# Patient Record
Sex: FEMALE | Race: WHITE | Employment: OTHER | URBAN - METROPOLITAN AREA
[De-identification: names, ages, dates, MRNs, and addresses within clinical notes are randomized per-mention and may not be internally consistent; named-entity substitution may affect disease eponyms.]

---

## 2017-01-03 ENCOUNTER — COMMUNICATION - HEALTHEAST (OUTPATIENT)
Dept: FAMILY MEDICINE | Facility: CLINIC | Age: 47
End: 2017-01-03

## 2017-01-03 DIAGNOSIS — F41.1 GENERALIZED ANXIETY DISORDER: ICD-10-CM

## 2017-01-10 ENCOUNTER — OFFICE VISIT - HEALTHEAST (OUTPATIENT)
Dept: RHEUMATOLOGY | Facility: CLINIC | Age: 47
End: 2017-01-10

## 2017-01-10 DIAGNOSIS — M79.7 FIBROMYALGIA: ICD-10-CM

## 2017-01-10 DIAGNOSIS — L40.50 PSORIATIC ARTHRITIS (H): ICD-10-CM

## 2017-01-11 ENCOUNTER — OFFICE VISIT - HEALTHEAST (OUTPATIENT)
Dept: PULMONOLOGY | Facility: OTHER | Age: 47
End: 2017-01-11

## 2017-01-11 ENCOUNTER — TRANSFERRED RECORDS (OUTPATIENT)
Dept: HEALTH INFORMATION MANAGEMENT | Facility: CLINIC | Age: 47
End: 2017-01-11

## 2017-01-11 DIAGNOSIS — R91.8 ABNORMAL CT SCAN, LUNG: ICD-10-CM

## 2017-01-11 DIAGNOSIS — J45.909 ASTHMA: ICD-10-CM

## 2017-01-11 DIAGNOSIS — K21.9 GERD (GASTROESOPHAGEAL REFLUX DISEASE): ICD-10-CM

## 2017-01-11 DIAGNOSIS — R91.1 LUNG NODULE: ICD-10-CM

## 2017-01-11 ASSESSMENT — MIFFLIN-ST. JEOR: SCORE: 1568.9

## 2017-01-16 ENCOUNTER — COMMUNICATION - HEALTHEAST (OUTPATIENT)
Dept: ADMINISTRATIVE | Facility: CLINIC | Age: 47
End: 2017-01-16

## 2017-01-24 ENCOUNTER — COMMUNICATION - HEALTHEAST (OUTPATIENT)
Dept: FAMILY MEDICINE | Facility: CLINIC | Age: 47
End: 2017-01-24

## 2017-01-24 ENCOUNTER — OFFICE VISIT - HEALTHEAST (OUTPATIENT)
Dept: RHEUMATOLOGY | Facility: CLINIC | Age: 47
End: 2017-01-24

## 2017-01-24 DIAGNOSIS — L40.50 PSORIATIC ARTHRITIS (H): ICD-10-CM

## 2017-01-24 DIAGNOSIS — M25.562 LEFT KNEE PAIN: ICD-10-CM

## 2017-01-25 ENCOUNTER — COMMUNICATION - HEALTHEAST (OUTPATIENT)
Dept: TELEHEALTH | Facility: CLINIC | Age: 47
End: 2017-01-25

## 2017-01-26 ENCOUNTER — AMBULATORY - HEALTHEAST (OUTPATIENT)
Dept: RHEUMATOLOGY | Facility: CLINIC | Age: 47
End: 2017-01-26

## 2017-01-31 ENCOUNTER — COMMUNICATION - HEALTHEAST (OUTPATIENT)
Dept: ADMINISTRATIVE | Facility: CLINIC | Age: 47
End: 2017-01-31

## 2017-02-07 ENCOUNTER — AMBULATORY - HEALTHEAST (OUTPATIENT)
Dept: RHEUMATOLOGY | Facility: CLINIC | Age: 47
End: 2017-02-07

## 2017-02-09 ENCOUNTER — OFFICE VISIT - HEALTHEAST (OUTPATIENT)
Dept: FAMILY MEDICINE | Facility: CLINIC | Age: 47
End: 2017-02-09

## 2017-02-09 DIAGNOSIS — F41.1 GENERALIZED ANXIETY DISORDER: ICD-10-CM

## 2017-02-09 DIAGNOSIS — L40.50 PSORIATIC ARTHRITIS (H): ICD-10-CM

## 2017-02-09 DIAGNOSIS — M79.7 FIBROMYALGIA: ICD-10-CM

## 2017-02-09 ASSESSMENT — MIFFLIN-ST. JEOR: SCORE: 1629.28

## 2017-02-13 ENCOUNTER — HOSPITAL ENCOUNTER (OUTPATIENT)
Dept: CT IMAGING | Facility: HOSPITAL | Age: 47
Discharge: HOME | End: 2017-02-13
Attending: INTERNAL MEDICINE

## 2017-02-13 DIAGNOSIS — R91.1 LUNG NODULE: ICD-10-CM

## 2017-02-14 ENCOUNTER — COMMUNICATION - HEALTHEAST (OUTPATIENT)
Dept: PULMONOLOGY | Facility: OTHER | Age: 47
End: 2017-02-14

## 2017-02-14 ENCOUNTER — COMMUNICATION - HEALTHEAST (OUTPATIENT)
Dept: ADMINISTRATIVE | Facility: CLINIC | Age: 47
End: 2017-02-14

## 2017-02-15 ENCOUNTER — TRANSFERRED RECORDS (OUTPATIENT)
Dept: HEALTH INFORMATION MANAGEMENT | Facility: CLINIC | Age: 47
End: 2017-02-15

## 2017-02-15 ENCOUNTER — HOSPITAL ENCOUNTER (OUTPATIENT)
Dept: PET IMAGING | Facility: HOSPITAL | Age: 47
Discharge: HOME | End: 2017-02-15
Attending: INTERNAL MEDICINE

## 2017-02-15 ENCOUNTER — COMMUNICATION - HEALTHEAST (OUTPATIENT)
Dept: MULTI SPECIALTY CLINIC | Facility: CLINIC | Age: 47
End: 2017-02-15

## 2017-02-15 DIAGNOSIS — R91.1 LUNG NODULE: ICD-10-CM

## 2017-02-15 ASSESSMENT — MIFFLIN-ST. JEOR: SCORE: 1582.5

## 2017-02-16 ENCOUNTER — COMMUNICATION - HEALTHEAST (OUTPATIENT)
Dept: PULMONOLOGY | Facility: OTHER | Age: 47
End: 2017-02-16

## 2017-02-16 ENCOUNTER — COMMUNICATION - HEALTHEAST (OUTPATIENT)
Dept: MULTI SPECIALTY CLINIC | Facility: CLINIC | Age: 47
End: 2017-02-16

## 2017-02-16 DIAGNOSIS — R59.0 MEDIASTINAL ADENOPATHY: ICD-10-CM

## 2017-02-16 DIAGNOSIS — R91.1 LUNG NODULE: ICD-10-CM

## 2017-02-17 ENCOUNTER — COMMUNICATION - HEALTHEAST (OUTPATIENT)
Dept: FAMILY MEDICINE | Facility: CLINIC | Age: 47
End: 2017-02-17

## 2017-02-19 ENCOUNTER — COMMUNICATION - HEALTHEAST (OUTPATIENT)
Dept: FAMILY MEDICINE | Facility: CLINIC | Age: 47
End: 2017-02-19

## 2017-02-20 ENCOUNTER — OFFICE VISIT - HEALTHEAST (OUTPATIENT)
Dept: FAMILY MEDICINE | Facility: CLINIC | Age: 47
End: 2017-02-20

## 2017-02-20 DIAGNOSIS — B02.9 HERPES ZOSTER WITHOUT COMPLICATION: ICD-10-CM

## 2017-02-20 DIAGNOSIS — R91.1 LUNG NODULE: ICD-10-CM

## 2017-02-20 ASSESSMENT — MIFFLIN-ST. JEOR
SCORE: 1614.26
SCORE: 1616.52
SCORE: 1614.26

## 2017-02-21 ENCOUNTER — ANESTHESIA - HEALTHEAST (OUTPATIENT)
Dept: SURGERY | Facility: CLINIC | Age: 47
End: 2017-02-21

## 2017-02-21 ENCOUNTER — HOSPITAL ENCOUNTER (OUTPATIENT)
Dept: CARDIOLOGY | Facility: CLINIC | Age: 47
Discharge: HOME | End: 2017-02-21
Attending: INTERNAL MEDICINE | Admitting: INTERNAL MEDICINE

## 2017-02-21 ENCOUNTER — SURGERY - HEALTHEAST (OUTPATIENT)
Dept: SURGERY | Facility: CLINIC | Age: 47
End: 2017-02-21

## 2017-02-21 ENCOUNTER — TRANSFERRED RECORDS (OUTPATIENT)
Dept: HEALTH INFORMATION MANAGEMENT | Facility: CLINIC | Age: 47
End: 2017-02-21

## 2017-02-21 ASSESSMENT — MIFFLIN-ST. JEOR
SCORE: 1613.35
SCORE: 1613.35

## 2017-02-22 ENCOUNTER — COMMUNICATION - HEALTHEAST (OUTPATIENT)
Dept: PULMONOLOGY | Facility: OTHER | Age: 47
End: 2017-02-22

## 2017-02-22 ENCOUNTER — COMMUNICATION - HEALTHEAST (OUTPATIENT)
Dept: FAMILY MEDICINE | Facility: CLINIC | Age: 47
End: 2017-02-22

## 2017-02-24 ENCOUNTER — COMMUNICATION - HEALTHEAST (OUTPATIENT)
Dept: INTENSIVE CARE | Facility: CLINIC | Age: 47
End: 2017-02-24

## 2017-02-24 DIAGNOSIS — R91.8 LUNG NODULES: Primary | ICD-10-CM

## 2017-02-27 NOTE — TELEPHONE ENCOUNTER
1.  Date/reason for appt: 3/1/17 - Left lower lobe nodule, Second opinion  2.  Referring provider: self-referred     3.  Call to patient (Yes / No - short description): Yes  4.  Previous care at / records requested from: [facility] - records received  5.  Recent Imaging: CT 1/27/17 - received from [facility]    *Pt is scheduled for CT prior to seeing Dr Call on 3/1/17*

## 2017-03-01 ENCOUNTER — PRE VISIT (OUTPATIENT)
Dept: PULMONOLOGY | Facility: CLINIC | Age: 47
End: 2017-03-01

## 2017-03-01 ENCOUNTER — OFFICE VISIT (OUTPATIENT)
Dept: PULMONOLOGY | Facility: CLINIC | Age: 47
End: 2017-03-01
Attending: INTERNAL MEDICINE
Payer: COMMERCIAL

## 2017-03-01 VITALS
HEART RATE: 86 BPM | HEIGHT: 66 IN | BODY MASS INDEX: 34.49 KG/M2 | TEMPERATURE: 98.5 F | DIASTOLIC BLOOD PRESSURE: 85 MMHG | RESPIRATION RATE: 18 BRPM | WEIGHT: 214.6 LBS | SYSTOLIC BLOOD PRESSURE: 123 MMHG | OXYGEN SATURATION: 98 %

## 2017-03-01 DIAGNOSIS — R91.8 PULMONARY NODULES: Primary | ICD-10-CM

## 2017-03-01 PROCEDURE — 99212 OFFICE O/P EST SF 10 MIN: CPT | Mod: ZF

## 2017-03-01 RX ORDER — DULOXETIN HYDROCHLORIDE 20 MG/1
CAPSULE, DELAYED RELEASE ORAL
COMMUNITY
Start: 2017-02-26 | End: 2021-06-29

## 2017-03-01 RX ORDER — HYDROXYZINE PAMOATE 25 MG/1
CAPSULE ORAL
COMMUNITY
Start: 2017-02-21 | End: 2021-02-23

## 2017-03-01 RX ORDER — HYDROCODONE BITARTRATE AND ACETAMINOPHEN 5; 325 MG/1; MG/1
TABLET ORAL
Refills: 0 | COMMUNITY
Start: 2017-02-10 | End: 2021-07-23

## 2017-03-01 RX ORDER — ALPRAZOLAM 0.5 MG
TABLET ORAL
COMMUNITY
Start: 2017-02-17 | End: 2021-02-23

## 2017-03-01 RX ORDER — VALACYCLOVIR HYDROCHLORIDE 1 G/1
TABLET, FILM COATED ORAL
COMMUNITY
Start: 2017-02-20 | End: 2021-02-23

## 2017-03-01 NOTE — MR AVS SNAPSHOT
"              After Visit Summary   3/1/2017    Sonya Mosqueda    MRN: [id number]           Patient Information     Date Of Birth          1970        Visit Information        Provider Department      3/1/2017 3:00 PM Chace Call MD [facility]        Today's Diagnoses     Pulmonary nodules    -  1       Follow-ups after your visit        Who to contact     If you have questions or need follow up information about today's clinic visit or your schedule please contact [facility] directly at [phone].  Normal or non-critical lab and imaging results will be communicated to you by Q1Mediahart, letter or phone within 4 business days after the clinic has received the results. If you do not hear from us within 7 days, please contact the clinic through [employer] or phone. If you have a critical or abnormal lab result, we will notify you by phone as soon as possible.  Submit refill requests through Singly or call your pharmacy and they will forward the refill request to us. Please allow 3 business days for your refill to be completed.          Additional Information About Your Visit        MyChart Information     Singly lets you send messages to your doctor, view your test results, renew your prescriptions, schedule appointments and more. To sign up, go to [email or web address] . Click on \"Log in\" on the left side of the screen, which will take you to the Welcome page. Then click on \"Sign up Now\" on the right side of the page.     You will be asked to enter the access code listed below, as well as some personal information. Please follow the directions to create your username and password.     Your access code is: [email or web address]  Expires: 2017  4:28 PM     Your access code will  in 90 days. If you need help or a new code, please call your [place] clinic or [phone].        Care EveryWhere ID     This is your Care EveryWhere ID. This could be used by other " "organizations to access your [place] medical records  [id number]        Your Vitals Were     Pulse Temperature Respirations Height Pulse Oximetry Breastfeeding?    86 98.5  F (36.9  C) (Oral) 18 1.683 m (5' 6.25\") 98% No    BMI (Body Mass Index)                   34.38 kg/m2            Blood Pressure from Last 3 Encounters:   03/01/17 123/85   07/08/09 98/62   12/29/05 102/70    Weight from Last 3 Encounters:   03/01/17 97.3 kg (214 lb 9.6 oz)   06/09/16 77.1 kg (170 lb)   07/08/09 77.1 kg (170 lb)              Today, you had the following     No orders found for display       Primary Care Provider Office Phone # Fax #    Josephine Bermudez DO [place] [phone]       [place] [facility] [address] [place] [address]        Thank you!     Thank you for choosing [facility]  for your care. Our goal is always to provide you with excellent care. Hearing back from our patients is one way we can continue to improve our services. Please take a few minutes to complete the written survey that you may receive in the mail after your visit with us. Thank you!             Your Updated Medication List - Protect others around you: Learn how to safely use, store and throw away your medicines at www.disposemymeds.org.          This list is accurate as of: 3/1/17 11:59 PM.  Always use your most recent med list.                   Brand Name Dispense Instructions for use    ADVAIR DISKUS 100-50 MCG/DOSE diskus inhaler   Generic drug:  fluticasone-salmeterol          ALPRAZolam 0.5 MG tablet    XANAX         celeXA 20 MG tablet   Generic drug:  citalopram      2 TABLETS DAILY       DULoxetine 20 MG EC capsule    CYMBALTA         HYDROcodone-acetaminophen 5-325 MG per tablet    NORCO     TAKE 1-2 TABS BY MOUTH ONCE DAILY AS NEEDED       hydrOXYzine 25 MG capsule    VISTARIL         IBUPROFEN 200 200 MG Tabs      1 TABLET EVERY 4 TO 6 HOURS AS NEEDED       PERCOCET 5-325 MG per tablet   Generic drug:  " oxyCODONE-acetaminophen      1 TABLET EVERY 6 HOURS AS NEEDED       PROTONIX 40 MG EC tablet   Generic drug:  pantoprazole      1 TABLET DAILY       QVAR 80 MCG/ACT Inhaler   Generic drug:  beclomethasone          valACYclovir 1000 mg tablet    VALTREX         ZOFRAN 8 MG tablet   Generic drug:  ondansetron      1 TABLET 3 TIMES DAILY       ZYRTEC 10 MG tablet   Generic drug:  cetirizine      1 TABLET DAILY

## 2017-03-01 NOTE — PROGRESS NOTES
[facility]  Lung Nodule Clinic Note  March 1, 2017    Chief complaint:  Sonya Mosqueda is a 46 year old female seen in the Pulmonary Clinic  for   Chief Complaint   Patient presents with     Oncology Clinic Visit     Pt is here for a second opinion on Bx she had     Assessment and Plan:  1.  Left upper lobe pleural based nodular density with station 11L, enlargement with PET positivity (SUV of 3.7).  The patient was seen in the [facility] system where she did have EBUS lymph node biopsies of station 7 and 11L revealing granulomatous inflammation.  I discussed with her the CT/PET as well as a fine needle aspiration cytology findings.  I indicated to her that these changes probably are related to her recent chest infection that she had in 12/2016 and my recommendation would be repeating a CT scan of the chest in 6-8 weeks, afterwards 6 months followup depending upon the CT reading.  I indicated to her that I would not do further invasive procedure at this point.   2.  Current smoker, half a pack per day for 20 years.  Also, secondhand smoke history through her parents while she was growing up.     I spent more than 45 minutes face to face and greater than 50% of time was for counseling and coordination of care about pulm nodules.    History of Present Illness:  A 46-year-old woman who had respiratory symptoms and seen by Pulmonology in late December, early January, when she at 1.5 cm peripheral pulmonary nodules, superior segment of the left lower lobe with adjacent small satellite nodularities and left hilar lymph node enlargement.  The recommendation at that time was to repeat CT scan of the chest in 1 month.  The patient repeated her CT scan of the chest in 02/13/2017.  When compared to previous CT scan, there was no significant interval change in the 5.5 cm peripheral pulmonary nodule in the superior segment of the left lower lobe and adjacent satellite nodularities.  The patient ended up having a PET scan as well as  "endobronchial ultrasound TBNA of station 7 and station 11L revealing granulomatous inflammation, no cancer.  The patient is here for a second opinion.     Exposure history: Asbestos;  No , TB;  No , Radiation;   No     Allergies   Allergen Reactions     Small Pox Vaccine      Per pt.\"My Mother was told never to allow it to be given again.\" Reaction when pt was a child     Bupropion Other (See Comments)     Cephalosporins Rash     Penicillins Rash               Past Medical History   Diagnosis Date     Calculus of kidney      Dysthymic disorder      Endometriosis, site unspecified      Myalgia and myositis, unspecified         Past Surgical History   Procedure Laterality Date     Surgical history of -   1987     laporoscopy     Surgical history of -        cryosurgery x 2        Social History     Social History     Marital status: Single     Spouse name: N/A     Number of children: N/A     Years of education: N/A     Occupational History     Not on file.     Social History Main Topics     Smoking status: Current Every Day Smoker     Packs/day: 0.50     Types: Cigarettes     Smokeless tobacco: Not on file     Alcohol use No     Drug use: Not on file     Sexual activity: Not on file     Other Topics Concern     Not on file     Social History Narrative        No family history on file.     Immunization History   Administered Date(s) Administered     Influenza (IIV3) 12/18/2008     TD (ADULT, 7+) 07/27/2004       Current Outpatient Prescriptions   Medication Sig     ALPRAZolam (XANAX) 0.5 MG tablet      QVAR 80 MCG/ACT Inhaler      DULoxetine (CYMBALTA) 20 MG EC capsule      ADVAIR DISKUS 100-50 MCG/DOSE diskus inhaler      HYDROcodone-acetaminophen (NORCO) 5-325 MG per tablet TAKE 1-2 TABS BY MOUTH ONCE DAILY AS NEEDED     hydrOXYzine (VISTARIL) 25 MG capsule      valACYclovir (VALTREX) 1000 mg tablet      CELEXA 20 MG OR TABS 2 TABLETS DAILY     ZOFRAN 8 MG OR TABS 1 TABLET 3 TIMES DAILY     PERCOCET 5-325 MG OR TABS " 1 TABLET EVERY 6 HOURS AS NEEDED     PROTONIX 40 MG OR TBEC 1 TABLET DAILY     ZYRTEC 10 MG OR TABS 1 TABLET DAILY     IBUPROFEN 200 200 MG OR TABS 1 TABLET EVERY 4 TO 6 HOURS AS NEEDED     No current facility-administered medications for this visit.       Physical examination  Constitutional: Alert, oriented, not in distress  Vitals: B/P: 123/85, T: 98.5, P: 86, R: 18  Eyes: No icterus, nystagmus, pupils isocoric  Musculoskeletal: Normal muscle mass, no dephormity  Integumentary:  No rash, normal texture and turgor, no edema  Neurological: Alert, orientedx3, no motor deficits, cranial nerves grossly normal  Psychiatric:  Mood and affect are appropriate with insight into his/her medical condition    Thank you for allowing me participate in the care of Sonya Mosqueda.    RASHAWN Call MD

## 2017-03-01 NOTE — NURSING NOTE
"Sonya Mosqueda is a 46 year old female who presents for:  Chief Complaint   Patient presents with     Oncology Clinic Visit     Pt is here for a second opinion on Bx she had        Initial Vitals:  /85 (BP Location: Left arm, Patient Position: Chair, Cuff Size: Adult Large)  Pulse 86  Temp 98.5  F (36.9  C) (Oral)  Resp 18  Ht 1.683 m (5' 6.25\")  Wt 97.3 kg (214 lb 9.6 oz)  SpO2 98%  Breastfeeding? No  BMI 34.38 kg/m2 Estimated body mass index is 34.38 kg/(m^2) as calculated from the following:    Height as of this encounter: 1.683 m (5' 6.25\").    Weight as of this encounter: 97.3 kg (214 lb 9.6 oz).. Body surface area is 2.13 meters squared. BP completed using cuff size: large  Data Unavailable No LMP recorded. Allergies and medications reviewed.     Medications: Medication refills not needed today.  Pharmacy name entered into Positionly: [facility] [id number] [place] [address]    Comments:     6 minutes for nursing intake (face to face time)   Noy Cantrell CMA        "

## 2017-03-01 NOTE — LETTER
3/1/2017       RE: Sonya Mosqueda  [address]  [facility] [place] [address]     Dear Colleague,    Thank you for referring your patient, Sonya Mosqueda, to the [facility]. Please see a copy of my visit note below.    [facility]  Lung Nodule Clinic Note  March 1, 2017    Chief complaint:  Sonya Mosqueda is a 46 year old female seen in the Pulmonary Clinic  for   Chief Complaint   Patient presents with     Oncology Clinic Visit     Pt is here for a second opinion on Bx she had     Assessment and Plan:  1.  Left upper lobe pleural based nodular density with station 11L, enlargement with PET positivity (SUV of 3.7).  The patient was seen in the [facility] system where she did have EBUS lymph node biopsies of station 7 and 11L revealing granulomatous inflammation.  I discussed with her the CT/PET as well as a fine needle aspiration cytology findings.  I indicated to her that these changes probably are related to her recent chest infection that she had in 12/2016 and my recommendation would be repeating a CT scan of the chest in 6-8 weeks, afterwards 6 months followup depending upon the CT reading.  I indicated to her that I would not do further invasive procedure at this point.   2.  Current smoker, half a pack per day for 20 years.  Also, secondhand smoke history through her parents while she was growing up.     I spent more than 45 minutes face to face and greater than 50% of time was for counseling and coordination of care about pulm nodules.    History of Present Illness:  A 46-year-old woman who had respiratory symptoms and seen by Pulmonology in late December, early January, when she at 1.5 cm peripheral pulmonary nodules, superior segment of the left lower lobe with adjacent small satellite nodularities and left hilar lymph node enlargement.  The recommendation at that time was to repeat CT scan of the chest in 1 month.  The patient repeated her CT scan  "of the chest in 02/13/2017.  When compared to previous CT scan, there was no significant interval change in the 5.5 cm peripheral pulmonary nodule in the superior segment of the left lower lobe and adjacent satellite nodularities.  The patient ended up having a PET scan as well as endobronchial ultrasound TBNA of station 7 and station 11L revealing granulomatous inflammation, no cancer.  The patient is here for a second opinion.     Exposure history: Asbestos;  No , TB;  No , Radiation;   No     Allergies   Allergen Reactions     Small Pox Vaccine      Per pt.\"My Mother was told never to allow it to be given again.\" Reaction when pt was a child     Bupropion Other (See Comments)     Cephalosporins Rash     Penicillins Rash               Past Medical History   Diagnosis Date     Calculus of kidney      Dysthymic disorder      Endometriosis, site unspecified      Myalgia and myositis, unspecified         Past Surgical History   Procedure Laterality Date     Surgical history of -   1987     laporoscopy     Surgical history of -        cryosurgery x 2        Social History     Social History     Marital status: Single     Spouse name: N/A     Number of children: N/A     Years of education: N/A     Occupational History     Not on file.     Social History Main Topics     Smoking status: Current Every Day Smoker     Packs/day: 0.50     Types: Cigarettes     Smokeless tobacco: Not on file     Alcohol use No     Drug use: Not on file     Sexual activity: Not on file     Other Topics Concern     Not on file     Social History Narrative        No family history on file.     Immunization History   Administered Date(s) Administered     Influenza (IIV3) 12/18/2008     TD (ADULT, 7+) 07/27/2004       Current Outpatient Prescriptions   Medication Sig     ALPRAZolam (XANAX) 0.5 MG tablet      QVAR 80 MCG/ACT Inhaler      DULoxetine (CYMBALTA) 20 MG EC capsule      ADVAIR DISKUS 100-50 MCG/DOSE diskus inhaler      " HYDROcodone-acetaminophen (NORCO) 5-325 MG per tablet TAKE 1-2 TABS BY MOUTH ONCE DAILY AS NEEDED     hydrOXYzine (VISTARIL) 25 MG capsule      valACYclovir (VALTREX) 1000 mg tablet      CELEXA 20 MG OR TABS 2 TABLETS DAILY     ZOFRAN 8 MG OR TABS 1 TABLET 3 TIMES DAILY     PERCOCET 5-325 MG OR TABS 1 TABLET EVERY 6 HOURS AS NEEDED     PROTONIX 40 MG OR TBEC 1 TABLET DAILY     ZYRTEC 10 MG OR TABS 1 TABLET DAILY     IBUPROFEN 200 200 MG OR TABS 1 TABLET EVERY 4 TO 6 HOURS AS NEEDED     No current facility-administered medications for this visit.       Physical examination  Constitutional: Alert, oriented, not in distress  Vitals: B/P: 123/85, T: 98.5, P: 86, R: 18  Eyes: No icterus, nystagmus, pupils isocoric  Musculoskeletal: Normal muscle mass, no dephormity  Integumentary:  No rash, normal texture and turgor, no edema  Neurological: Alert, orientedx3, no motor deficits, cranial nerves grossly normal  Psychiatric:  Mood and affect are appropriate with insight into his/her medical condition    Thank you for allowing me participate in the care of Sonya Mosqueda.    RASHAWN Call MD

## 2017-03-02 ENCOUNTER — COMMUNICATION - HEALTHEAST (OUTPATIENT)
Dept: FAMILY MEDICINE | Facility: CLINIC | Age: 47
End: 2017-03-02

## 2017-03-02 DIAGNOSIS — E78.5 HYPERLIPEMIA: ICD-10-CM

## 2017-03-03 ENCOUNTER — COMMUNICATION - HEALTHEAST (OUTPATIENT)
Dept: PULMONOLOGY | Facility: OTHER | Age: 47
End: 2017-03-03

## 2017-03-03 DIAGNOSIS — R59.0 MEDIASTINAL LYMPHADENOPATHY: ICD-10-CM

## 2017-03-06 ENCOUNTER — RECORDS - HEALTHEAST (OUTPATIENT)
Dept: ADMINISTRATIVE | Facility: OTHER | Age: 47
End: 2017-03-06

## 2017-03-06 PROCEDURE — 00000346 ZZHCL STATISTIC REVIEW OUTSIDE SLIDES TC 88321: Performed by: INTERNAL MEDICINE

## 2017-03-12 ENCOUNTER — COMMUNICATION - HEALTHEAST (OUTPATIENT)
Dept: FAMILY MEDICINE | Facility: CLINIC | Age: 47
End: 2017-03-12

## 2017-03-12 DIAGNOSIS — K21.9 GERD (GASTROESOPHAGEAL REFLUX DISEASE): ICD-10-CM

## 2017-03-12 DIAGNOSIS — M79.10 MYALGIA: ICD-10-CM

## 2017-03-12 DIAGNOSIS — M60.9 MYOSITIS: ICD-10-CM

## 2017-03-15 ENCOUNTER — COMMUNICATION - HEALTHEAST (OUTPATIENT)
Dept: FAMILY MEDICINE | Facility: CLINIC | Age: 47
End: 2017-03-15

## 2017-03-15 DIAGNOSIS — J45.909 ASTHMA: ICD-10-CM

## 2017-03-17 LAB — COPATH REPORT: NORMAL

## 2017-03-22 LAB
LAB AP CHARGES (HE HISTORICAL CONVERSION): ABNORMAL
LAB AP INITIAL CYTO EVAL (HE HISTORICAL CONVERSION): ABNORMAL
LAB MED GENERAL PATH INTERP (HE HISTORICAL CONVERSION): ABNORMAL
PATH REPORT.ADDENDUM SPEC: ABNORMAL
PATH REPORT.COMMENTS IMP SPEC: ABNORMAL
PATH REPORT.COMMENTS IMP SPEC: ABNORMAL
PATH REPORT.FINAL DX SPEC: ABNORMAL
PATH REPORT.MICROSCOPIC SPEC OTHER STN: ABNORMAL
PATH REPORT.RELEVANT HX SPEC: ABNORMAL
SPECIMEN DESCRIPTION: ABNORMAL

## 2017-03-28 ENCOUNTER — COMMUNICATION - HEALTHEAST (OUTPATIENT)
Dept: FAMILY MEDICINE | Facility: CLINIC | Age: 47
End: 2017-03-28

## 2017-03-28 DIAGNOSIS — F41.1 GENERALIZED ANXIETY DISORDER: ICD-10-CM

## 2017-04-14 ENCOUNTER — COMMUNICATION - HEALTHEAST (OUTPATIENT)
Dept: FAMILY MEDICINE | Facility: CLINIC | Age: 47
End: 2017-04-14

## 2017-04-14 DIAGNOSIS — M60.9 MYOSITIS: ICD-10-CM

## 2017-04-14 DIAGNOSIS — M79.10 MYALGIA: ICD-10-CM

## 2017-04-19 ENCOUNTER — OFFICE VISIT - HEALTHEAST (OUTPATIENT)
Dept: FAMILY MEDICINE | Facility: CLINIC | Age: 47
End: 2017-04-19

## 2017-04-19 ENCOUNTER — COMMUNICATION - HEALTHEAST (OUTPATIENT)
Dept: SCHEDULING | Facility: CLINIC | Age: 47
End: 2017-04-19

## 2017-04-19 DIAGNOSIS — J01.90 ACUTE NON-RECURRENT SINUSITIS, UNSPECIFIED LOCATION: ICD-10-CM

## 2017-04-26 ENCOUNTER — RECORDS - HEALTHEAST (OUTPATIENT)
Dept: ADMINISTRATIVE | Facility: OTHER | Age: 47
End: 2017-04-26

## 2017-05-01 ENCOUNTER — HOSPITAL ENCOUNTER (OUTPATIENT)
Dept: CT IMAGING | Facility: HOSPITAL | Age: 47
Discharge: HOME | End: 2017-05-01

## 2017-05-01 DIAGNOSIS — R59.0 MEDIASTINAL LYMPHADENOPATHY: ICD-10-CM

## 2017-05-02 ENCOUNTER — COMMUNICATION - HEALTHEAST (OUTPATIENT)
Dept: PULMONOLOGY | Facility: OTHER | Age: 47
End: 2017-05-02

## 2017-05-11 ENCOUNTER — COMMUNICATION - HEALTHEAST (OUTPATIENT)
Dept: FAMILY MEDICINE | Facility: CLINIC | Age: 47
End: 2017-05-11

## 2017-05-11 DIAGNOSIS — F41.1 GENERALIZED ANXIETY DISORDER: ICD-10-CM

## 2017-05-19 ENCOUNTER — RECORDS - HEALTHEAST (OUTPATIENT)
Dept: ADMINISTRATIVE | Facility: OTHER | Age: 47
End: 2017-05-19

## 2017-05-28 ENCOUNTER — COMMUNICATION - HEALTHEAST (OUTPATIENT)
Dept: SCHEDULING | Facility: CLINIC | Age: 47
End: 2017-05-28

## 2017-05-30 ENCOUNTER — COMMUNICATION - HEALTHEAST (OUTPATIENT)
Dept: FAMILY MEDICINE | Facility: CLINIC | Age: 47
End: 2017-05-30

## 2017-05-31 ENCOUNTER — COMMUNICATION - HEALTHEAST (OUTPATIENT)
Dept: FAMILY MEDICINE | Facility: CLINIC | Age: 47
End: 2017-05-31

## 2017-06-17 ENCOUNTER — HEALTH MAINTENANCE LETTER (OUTPATIENT)
Age: 47
End: 2017-06-17

## 2017-06-22 ENCOUNTER — COMMUNICATION - HEALTHEAST (OUTPATIENT)
Dept: FAMILY MEDICINE | Facility: CLINIC | Age: 47
End: 2017-06-22

## 2017-06-22 DIAGNOSIS — M79.10 MYALGIA: ICD-10-CM

## 2017-06-22 DIAGNOSIS — M60.9 MYOSITIS: ICD-10-CM

## 2017-06-26 ENCOUNTER — AMBULATORY - HEALTHEAST (OUTPATIENT)
Dept: LAB | Facility: CLINIC | Age: 47
End: 2017-06-26

## 2017-06-26 DIAGNOSIS — E78.00 PURE HYPERCHOLESTEROLEMIA: ICD-10-CM

## 2017-06-26 LAB
CHOLEST SERPL-MCNC: 231 MG/DL
FASTING STATUS PATIENT QL REPORTED: YES
HDLC SERPL-MCNC: 69 MG/DL
LDLC SERPL CALC-MCNC: 133 MG/DL
TRIGL SERPL-MCNC: 147 MG/DL

## 2017-06-29 ENCOUNTER — OFFICE VISIT (OUTPATIENT)
Dept: RHEUMATOLOGY | Facility: CLINIC | Age: 47
End: 2017-06-29
Payer: COMMERCIAL

## 2017-06-29 ENCOUNTER — OFFICE VISIT - HEALTHEAST (OUTPATIENT)
Dept: FAMILY MEDICINE | Facility: CLINIC | Age: 47
End: 2017-06-29

## 2017-06-29 ENCOUNTER — COMMUNICATION - HEALTHEAST (OUTPATIENT)
Dept: FAMILY MEDICINE | Facility: CLINIC | Age: 47
End: 2017-06-29

## 2017-06-29 VITALS
HEART RATE: 82 BPM | DIASTOLIC BLOOD PRESSURE: 72 MMHG | TEMPERATURE: 99.3 F | WEIGHT: 231 LBS | BODY MASS INDEX: 37 KG/M2 | SYSTOLIC BLOOD PRESSURE: 114 MMHG

## 2017-06-29 DIAGNOSIS — M50.30 DEGENERATION OF CERVICAL INTERVERTEBRAL DISC: ICD-10-CM

## 2017-06-29 DIAGNOSIS — M79.7 FIBROMYALGIA: ICD-10-CM

## 2017-06-29 DIAGNOSIS — M25.50 MULTIPLE JOINT PAIN: Primary | ICD-10-CM

## 2017-06-29 DIAGNOSIS — L40.9 PSORIASIS: ICD-10-CM

## 2017-06-29 PROCEDURE — 99203 OFFICE O/P NEW LOW 30 MIN: CPT | Performed by: INTERNAL MEDICINE

## 2017-06-29 NOTE — NURSING NOTE
"Chief Complaint   Patient presents with     Consult     [facility]        Initial /72  Pulse 82  Temp 99.3  F (37.4  C)  Wt 231 lb (104.8 kg)  BMI 37 kg/m2 Estimated body mass index is 37 kg/(m^2) as calculated from the following:    Height as of 3/1/17: 5' 6.25\" (1.683 m).    Weight as of this encounter: 231 lb (104.8 kg).      Patient prefers to be contacted via at Home.   Okay to leave detailed message: Yes  Patient uses MyChart: No    Khloe MARX LPN    "

## 2017-06-29 NOTE — MR AVS SNAPSHOT
"              After Visit Summary   6/29/2017    Sonya Mosqueda    MRN: [id number]           Patient Information     Date Of Birth          1970        Visit Information        Provider Department      6/29/2017 2:45 PM Rajat Nuñez MD [facility]         Follow-ups after your visit        Your next 10 appointments already scheduled     Aug 09, 2017  7:45 AM CDT   Return Visit with Rajat Nuñez MD   [facility]    [address] [id number] [phone]              Who to contact     If you have questions or need follow up information about today's clinic visit or your schedule please contact [facility] directly at [phone].  Normal or non-critical lab and imaging results will be communicated to you by MyChart, letter or phone within 4 business days after the clinic has received the results. If you do not hear from us within 7 days, please contact the clinic through MyChart or phone. If you have a critical or abnormal lab result, we will notify you by phone as soon as possible.  Submit refill requests through iFlexMe or call your pharmacy and they will forward the refill request to us. Please allow 3 business days for your refill to be completed.          Additional Information About Your Visit        MyChart Information     iFlexMe lets you send messages to your doctor, view your test results, renew your prescriptions, schedule appointments and more. To sign up, go to [email or web address] . Click on \"Log in\" on the left side of the screen, which will take you to the Welcome page. Then click on \"Sign up Now\" on the right side of the page.     You will be asked to enter the access code listed below, as well as some personal information. Please follow the directions to create your username and password.     Your access code is: [email or web address]  Expires: 9/27/2017  3:06 PM     Your access code will "  in 90 days. If you need help or a new code, please call your [place] clinic or [phone].        Care EveryWhere ID     This is your Care EveryWhere ID. This could be used by other organizations to access your [place] medical records  [id number]        Your Vitals Were     Pulse Temperature BMI (Body Mass Index)             82 99.3  F (37.4  C) 37 kg/m2          Blood Pressure from Last 3 Encounters:   17 114/72   17 123/85   09 98/62    Weight from Last 3 Encounters:   17 231 lb (104.8 kg)   17 214 lb 9.6 oz (97.3 kg)   16 170 lb (77.1 kg)              Today, you had the following     No orders found for display       Primary Care Provider Office Phone # Fax #    Josephine Bermudez DO [place] [phone]       [place] [address] [place] [address]        Equal Access to Services     TRACIE ESQUIVEL : Hadii aad ku hadasho Soomaali, waaxda luqadaha, qaybta kaalmada adeegyada, waxay idiin haymarshal rubi . So [facility] [phone].    ATENCIÓN: Si habla español, tiene a whiting disposición servicios gratuitos de asistencia lingüística. Llame al [phone].    We comply with applicable federal civil rights laws and [place] laws. We do not discriminate on the basis of race, color, national origin, age, disability sex, sexual orientation or gender identity.            Thank you!     Thank you for choosing [facility]  for your care. Our goal is always to provide you with excellent care. Hearing back from our patients is one way we can continue to improve our services. Please take a few minutes to complete the written survey that you may receive in the mail after your visit with us. Thank you!             Your Updated Medication List - Protect others around you: Learn how to safely use, store and throw away your medicines at www.disposemymeds.org.          This list is accurate as of: 17  3:06 PM.  Always use your most recent  med list.                   Brand Name Dispense Instructions for use Diagnosis    ADVAIR DISKUS 100-50 MCG/DOSE diskus inhaler   Generic drug:  fluticasone-salmeterol           ALPRAZolam 0.5 MG tablet    XANAX          celeXA 20 MG tablet   Generic drug:  citalopram      2 TABLETS DAILY        DULoxetine 20 MG EC capsule    CYMBALTA          HYDROcodone-acetaminophen 5-325 MG per tablet    NORCO     TAKE 1-2 TABS BY MOUTH ONCE DAILY AS NEEDED        hydrOXYzine 25 MG capsule    VISTARIL          IBUPROFEN 200 200 MG Tabs      1 TABLET EVERY 4 TO 6 HOURS AS NEEDED        PERCOCET 5-325 MG per tablet   Generic drug:  oxyCODONE-acetaminophen      1 TABLET EVERY 6 HOURS AS NEEDED        PROTONIX 40 MG EC tablet   Generic drug:  pantoprazole      1 TABLET DAILY    Esophageal reflux       QVAR 80 MCG/ACT Inhaler   Generic drug:  beclomethasone           valACYclovir 1000 mg tablet    VALTREX          ZOFRAN 8 MG tablet   Generic drug:  ondansetron      1 TABLET 3 TIMES DAILY        ZYRTEC 10 MG tablet   Generic drug:  cetirizine      1 TABLET DAILY

## 2017-07-10 ENCOUNTER — TELEPHONE (OUTPATIENT)
Dept: RHEUMATOLOGY | Facility: CLINIC | Age: 47
End: 2017-07-10

## 2017-07-10 DIAGNOSIS — M25.50 MULTIPLE JOINT PAIN: Primary | ICD-10-CM

## 2017-07-10 NOTE — TELEPHONE ENCOUNTER
See note below. No documentation from provider. Please review and proceed with medication discussed.   Meredith ADAN RN BSN PHN  Specialty Clinics

## 2017-07-10 NOTE — TELEPHONE ENCOUNTER
Reason for Call:  Patient is calling in states that she is flaring and would like to proceed with starting medication    Detailed comments: please advise how to proceed    Phone Number Patient can be reached at: Home number on file [phone] (home)    Best Time: any    Can we leave a detailed message on this number? YES    Call taken on 7/10/2017 at 3:46 PM by Brii Colon

## 2017-07-11 RX ORDER — SULFASALAZINE 500 MG/1
500 TABLET, DELAYED RELEASE ORAL 2 TIMES DAILY
Qty: 120 TABLET | Refills: 2 | Status: SHIPPED | OUTPATIENT
Start: 2017-07-11 | End: 2021-02-23

## 2017-07-11 NOTE — TELEPHONE ENCOUNTER
Called and spoke with pt regarding note below. Pt informed. Pt has appointment scheduled with Dr Nuñez on 8/9.   No further questions or concerns.   Lilliam LITTLE RN  Specialty [facility]

## 2017-07-11 NOTE — TELEPHONE ENCOUNTER
I think we talked about plaquenil?  Could you please check or was it methotrexate?  Thanks  Sorry about that

## 2017-07-11 NOTE — TELEPHONE ENCOUNTER
Asulfazine was what patient recalls.  Any that have the least side effects and least drug monitoring is good with her.  Trinity Ventura RN  [facility]

## 2017-07-11 NOTE — TELEPHONE ENCOUNTER
"Dr Nuñez-    Spoke to patient who stated:    \"He told me next time I had a flare to take the Prednisone to see if it is helping or not. It is helping. I was going to try and control this with diet, but it isn't working because I am terrible about following a diet... This time my feet swelled up like little sausages and they are extremely painful and I have swollen toes, my left knee is swollen and a little warm and about an inch and a half bigger than the other knee, going down the steps yesterday about wiped me out. My right pointer finger also swelled up and my bottom eyelids swelled up and are dry and puffy. They were red and swollen a few days ago, so bad I couldn't wear makeup but they are just puffy today. The medicine I think he said was a Dmard medicine I could try?...\"    Reports she \"was taking 20 mg Prednisone for 5 days but I finished it a couple days ago and now my symptoms are creeping back so much that I noticed a difference in just being off it the last couple days.\"    Uses [facility] pharmacy.    Ok to leave detailed message- Special ed [facility] .     Please advise. Columba Thompson RN    "

## 2017-07-11 NOTE — TELEPHONE ENCOUNTER
Ok, I will send it in.  Follow directions on RX.  Can take 6-12 weeks to work.  Should be seen back at that time

## 2017-07-11 NOTE — TELEPHONE ENCOUNTER
Unfortunately my note is missing---can you clarify what we discussed?  Was it prednisone  And what exactly is flaring?  thanks

## 2017-07-18 NOTE — PROGRESS NOTES
PRIMARY CARE PHYSICIAN:  Dr. Josephine Jaimes.    CHIEF COMPLAINT:  Joint pain.    HISTORY OF PRESENT ILLNESS:  The patient had been seen by Dr. Aleena bush in 2004 who thought she had fibromyalgia.  More recently seen by Dr. Crawford who is with [facility].  She did not feel that she had a very good interaction with him and is hoping to get a different interaction today.  She does clearly have psoriasis that is mostly on her scalp.  She feels it is mostly controlled with light therapy and topical steroid creams.  She does not feel it needs to be treated more aggressively today.  She apparently was given Otezla by her dermatologist but after 2 weeks of headaches, decided to discontinue it.    When she hurts she is especially hurting in the left lateral region of both feet.  There is some pain and swelling that is somewhat episodic.  She also does hurt somewhat in her hands and is noticing pain in other areas as well.        She had been given a Medrol Dosepak for treatment of allergic reaction and does think it had helped the joint pain when it was given over 6 months ago.    Recently was seen by pulmonology and found to have some nodules.  There was some concern that she could have sarcoid, but the most recent CT scan in February revealed improvement in the nodule.  It was felt that it might be infection related.  The diagnosis of sarcoid was never made or proven.  Fine needle aspiration revealed polymorphous lymphoid aggregates that were nonspecific in nature.  A nonnecrotizing granuloma was present as well.  A CT did reveal a small left hilar lymph node that did appear decreased in size.      What she really wants to do is establish care and is not certain that she wants any more aggressive therapy at this point.  She says the ibuprofen helps tremendously.  What she would like to do is lose weight and try an anti-inflammatory diet.  She would like to be seen back prior to school when things often get worse and when  treatment may become necessary.    PAST MEDICAL HISTORY:  Psoriasis, fibromyalgia.    MEDICATIONS:  Alprazolam, QVAR inhaler, duloxetine, Advair, hydrocodone p.r.n., Vistaril, Zofran p.r.n., Percocet, Protonix.    ALLERGIES:  Bupropion, cephalosporins.    SOCIAL HISTORY:  Is not a smoker or drinker.    FAMILY HISTORY:  There is nobody in her family with rheumatoid arthritis, lupus.    REVIEW OF SYSTEMS:  A 10-point review is otherwise negative.    PHYSICAL EXAMINATION:  VITAL SIGNS:  Blood pressure 114/72, pulse 82, weight 231.    HEENT:  She is normocephalic, atraumatic.  Sclerae are clear and moist.  Oropharynx without lesions.    NECK:  Supple without lymphadenopathy.    LUNGS:  Clear to auscultation bilaterally without wheeze or rale.    HEART:  Regular rate and rhythm without murmur or rub.    ABDOMEN:  Nontender with normal bowel sounds.    JOINT EXAM:  I really do not see any dactylitis in the upper or lower extremities.  There is nothing that looks like synovitis at the wrists, MCPs or PIPs.  There is no synovitis of the shoulders, knees or ankles.  She has tender points of fibromyalgia upper chest wall region, neck, trapezius region, mid scapular and sternal border, etc.    SKIN:  No obvious psoriasis seen today.    IMPRESSION:    1.  Fibromyalgia.  2.  Psoriasis.  3.  Possible mild psoriatic arthritis.    RECOMMENDATIONS:    1.  For now, she will continue to use NSAIDS for symptoms as needed.  2.  If things get worse, we talked about attempting to diagnose her with either a course of prednisone and if responsive, then consider long-term therapy with sulfasalazine or methotrexate.  3.  She will follow up with me prior to school.        Rajat Nuñez MD    D:  07/12/2017 15:20 T:  07/18/2017 04:17  Document:  [id number] \LG

## 2017-07-21 ENCOUNTER — COMMUNICATION - HEALTHEAST (OUTPATIENT)
Dept: FAMILY MEDICINE | Facility: CLINIC | Age: 47
End: 2017-07-21

## 2017-07-21 DIAGNOSIS — K21.9 GERD (GASTROESOPHAGEAL REFLUX DISEASE): ICD-10-CM

## 2017-08-01 ENCOUNTER — OFFICE VISIT - HEALTHEAST (OUTPATIENT)
Dept: FAMILY MEDICINE | Facility: CLINIC | Age: 47
End: 2017-08-01

## 2017-08-01 DIAGNOSIS — E66.9 OBESITY: ICD-10-CM

## 2017-08-01 DIAGNOSIS — R60.0 PERIPHERAL EDEMA: ICD-10-CM

## 2017-08-01 DIAGNOSIS — L40.50 PSORIATIC ARTHRITIS (H): ICD-10-CM

## 2017-08-01 ASSESSMENT — MIFFLIN-ST. JEOR: SCORE: 1741.26

## 2017-08-07 ENCOUNTER — COMMUNICATION - HEALTHEAST (OUTPATIENT)
Dept: FAMILY MEDICINE | Facility: CLINIC | Age: 47
End: 2017-08-07

## 2017-08-07 DIAGNOSIS — K21.9 GERD (GASTROESOPHAGEAL REFLUX DISEASE): ICD-10-CM

## 2017-08-07 DIAGNOSIS — F41.1 GENERALIZED ANXIETY DISORDER: ICD-10-CM

## 2017-08-08 ENCOUNTER — AMBULATORY - HEALTHEAST (OUTPATIENT)
Dept: LAB | Facility: CLINIC | Age: 47
End: 2017-08-08

## 2017-08-08 ENCOUNTER — OFFICE VISIT - HEALTHEAST (OUTPATIENT)
Dept: FAMILY MEDICINE | Facility: CLINIC | Age: 47
End: 2017-08-08

## 2017-08-08 DIAGNOSIS — K21.9 ACID REFLUX: ICD-10-CM

## 2017-08-08 DIAGNOSIS — Z71.9 HEALTH EDUCATION: ICD-10-CM

## 2017-08-08 DIAGNOSIS — R06.83 SNORING: ICD-10-CM

## 2017-08-08 DIAGNOSIS — Z13.0 SCREENING FOR DEFICIENCY ANEMIA: ICD-10-CM

## 2017-08-08 DIAGNOSIS — E55.9 VITAMIN D DEFICIENCY: ICD-10-CM

## 2017-08-08 DIAGNOSIS — E66.9 OBESITY, UNSPECIFIED: ICD-10-CM

## 2017-08-08 DIAGNOSIS — R63.5 WEIGHT GAIN: ICD-10-CM

## 2017-08-08 DIAGNOSIS — I87.2 VENOUS INSUFFICIENCY OF BOTH LOWER EXTREMITIES: ICD-10-CM

## 2017-08-08 DIAGNOSIS — F50.20 BULIMIA NERVOSA: ICD-10-CM

## 2017-08-08 DIAGNOSIS — Z13.1 SCREENING FOR DIABETES MELLITUS: ICD-10-CM

## 2017-08-08 DIAGNOSIS — Z13.29 SCREENING FOR THYROID DISORDER: ICD-10-CM

## 2017-08-08 DIAGNOSIS — E78.00 PURE HYPERCHOLESTEROLEMIA: ICD-10-CM

## 2017-08-08 LAB — HBA1C MFR BLD: 5.5 % (ref 3.5–6)

## 2017-08-08 ASSESSMENT — MIFFLIN-ST. JEOR: SCORE: 1728.79

## 2017-08-09 ENCOUNTER — OFFICE VISIT (OUTPATIENT)
Dept: RHEUMATOLOGY | Facility: CLINIC | Age: 47
End: 2017-08-09
Payer: COMMERCIAL

## 2017-08-09 ENCOUNTER — RECORDS - HEALTHEAST (OUTPATIENT)
Dept: ADMINISTRATIVE | Facility: OTHER | Age: 47
End: 2017-08-09

## 2017-08-09 VITALS
DIASTOLIC BLOOD PRESSURE: 82 MMHG | SYSTOLIC BLOOD PRESSURE: 128 MMHG | BODY MASS INDEX: 38.45 KG/M2 | WEIGHT: 240 LBS | HEART RATE: 86 BPM

## 2017-08-09 DIAGNOSIS — M79.7 FIBROMYALGIA: Primary | ICD-10-CM

## 2017-08-09 DIAGNOSIS — L40.9 PSORIASIS: ICD-10-CM

## 2017-08-09 PROCEDURE — 99214 OFFICE O/P EST MOD 30 MIN: CPT | Performed by: INTERNAL MEDICINE

## 2017-08-09 NOTE — NURSING NOTE
"Chief Complaint   Patient presents with     RECHECK     PSA- would like to go over medications       Initial /82  Pulse 86  Wt 240 lb (108.9 kg)  BMI 38.45 kg/m2 Estimated body mass index is 38.45 kg/(m^2) as calculated from the following:    Height as of 3/1/17: 5' 6.25\" (1.683 m).    Weight as of this encounter: 240 lb (108.9 kg).  Medication Reconciliation: complete    "

## 2017-08-09 NOTE — PROGRESS NOTES
Ms. Sonya Mosqueda is seen back in followup to review her status.  She has now been on sulfasalazine for about a month.  She has had a reasonable response to prednisone, she felt better but when she came off it she had a dramatic flare with worsening symptoms involving her knees and ankles.  She also developed some peripheral edema which could have been related to prednisone but would not have been related to the usage of sulfasalazine or be related to psoriatic arthritis.  She also has seen a weight loss Doctor and is going to be going on phentermine to try to lose weight.  This is the heaviest weight she has been and she is wondering if this could be a partial explanation for why her knees and ankles hurt.      At this point, there are no obvious problems with sulfasalazine such as nausea, vomiting, diarrhea.  Once again we are embarking on this course because there was improvement on prednisone from her point of view.      PHYSICAL EXAMINATION:   VITAL SIGNS:  Blood pressure 128/82, pulse 86, weight 240.   NECK:  Supple without lymphadenopathy.   LUNGS:  Clear.   HEART:  Regular.   MUSCULOSKELETAL:  Joint exam no synovitis of the wrists, MCPs, or PIPs.  There is no synovitis of the elbows, shoulders, knees or ankles.   SKIN:  Without lesions.      ASSESSMENT:   1.  Fibromyalgia.   2.  Psoriatic arthritis.      RECOMMENDATIONS:   1.  Discussed the knee and ankle pain is probably multifactorial and certainly could be due to weight and deconditioning as well as a component of psoriatic arthritis; there is no real way of telling this.  We are embarking on this course of action because she did have improvement with prednisone.  Sulfasalazine does take 6-12 weeks to work, so I would give it more time.   2.  She will continue to use NSAIDs as needed for breakthrough symptoms, although this certainly could be contributing to peripheral edema.  The edema though would not be related to her arthritis, nor to sulfasalazine  usage.   3.  Encourage regular exercise and weight loss which may help some of the symptoms of lower extremities.   4.  Follow up with me in 2 months.  We will need check labs at that point.         GUS CALLOWAY MD             D: 2017 10:05   T: 2017 10:32   MT: [id number]      Name:     AMAURI GONZALEZ   MRN:      [id number]        Account:      [id number]   :      1970           Visit Date:   2017      Document: [id number]       cc: Jorge A Barker MD

## 2017-08-14 ENCOUNTER — COMMUNICATION - HEALTHEAST (OUTPATIENT)
Dept: SCHEDULING | Facility: CLINIC | Age: 47
End: 2017-08-14

## 2017-08-14 DIAGNOSIS — E66.9 OBESITY, UNSPECIFIED: ICD-10-CM

## 2017-08-31 ENCOUNTER — RECORDS - HEALTHEAST (OUTPATIENT)
Dept: ADMINISTRATIVE | Facility: OTHER | Age: 47
End: 2017-08-31

## 2017-09-04 ENCOUNTER — COMMUNICATION - HEALTHEAST (OUTPATIENT)
Dept: FAMILY MEDICINE | Facility: CLINIC | Age: 47
End: 2017-09-04

## 2017-09-04 DIAGNOSIS — M79.10 MYALGIA: ICD-10-CM

## 2017-09-04 DIAGNOSIS — M60.9 MYOSITIS: ICD-10-CM

## 2017-09-08 ENCOUNTER — COMMUNICATION - HEALTHEAST (OUTPATIENT)
Dept: FAMILY MEDICINE | Facility: CLINIC | Age: 47
End: 2017-09-08

## 2017-09-11 ENCOUNTER — OFFICE VISIT - HEALTHEAST (OUTPATIENT)
Dept: FAMILY MEDICINE | Facility: CLINIC | Age: 47
End: 2017-09-11

## 2017-09-11 DIAGNOSIS — R06.83 SNORING: ICD-10-CM

## 2017-09-11 DIAGNOSIS — E78.00 PURE HYPERCHOLESTEROLEMIA: ICD-10-CM

## 2017-09-11 DIAGNOSIS — E66.9 OBESITY, UNSPECIFIED: ICD-10-CM

## 2017-09-11 DIAGNOSIS — E55.9 VITAMIN D DEFICIENCY: ICD-10-CM

## 2017-09-26 ENCOUNTER — COMMUNICATION - HEALTHEAST (OUTPATIENT)
Dept: FAMILY MEDICINE | Facility: CLINIC | Age: 47
End: 2017-09-26

## 2017-09-26 DIAGNOSIS — M79.10 MYALGIA: ICD-10-CM

## 2017-09-26 DIAGNOSIS — M60.9 MYOSITIS: ICD-10-CM

## 2017-10-10 ENCOUNTER — COMMUNICATION - HEALTHEAST (OUTPATIENT)
Dept: FAMILY MEDICINE | Facility: CLINIC | Age: 47
End: 2017-10-10

## 2017-10-10 DIAGNOSIS — F41.1 GENERALIZED ANXIETY DISORDER: ICD-10-CM

## 2017-10-12 ENCOUNTER — OFFICE VISIT - HEALTHEAST (OUTPATIENT)
Dept: FAMILY MEDICINE | Facility: CLINIC | Age: 47
End: 2017-10-12

## 2017-10-12 DIAGNOSIS — E55.9 VITAMIN D DEFICIENCY: ICD-10-CM

## 2017-10-12 DIAGNOSIS — R06.83 SNORING: ICD-10-CM

## 2017-10-12 DIAGNOSIS — E66.9 OBESITY: ICD-10-CM

## 2017-10-12 DIAGNOSIS — E78.00 PURE HYPERCHOLESTEROLEMIA: ICD-10-CM

## 2017-10-19 ENCOUNTER — COMMUNICATION - HEALTHEAST (OUTPATIENT)
Dept: FAMILY MEDICINE | Facility: CLINIC | Age: 47
End: 2017-10-19

## 2017-10-24 ENCOUNTER — COMMUNICATION - HEALTHEAST (OUTPATIENT)
Dept: FAMILY MEDICINE | Facility: CLINIC | Age: 47
End: 2017-10-24

## 2017-10-24 DIAGNOSIS — M79.10 MYALGIA: ICD-10-CM

## 2017-10-24 DIAGNOSIS — M60.9 MYOSITIS: ICD-10-CM

## 2017-11-10 ENCOUNTER — OFFICE VISIT - HEALTHEAST (OUTPATIENT)
Dept: FAMILY MEDICINE | Facility: CLINIC | Age: 47
End: 2017-11-10

## 2017-11-10 DIAGNOSIS — Z12.31 VISIT FOR SCREENING MAMMOGRAM: ICD-10-CM

## 2017-11-10 DIAGNOSIS — E78.00 PURE HYPERCHOLESTEROLEMIA: ICD-10-CM

## 2017-11-10 DIAGNOSIS — R06.83 SNORING: ICD-10-CM

## 2017-11-10 DIAGNOSIS — E55.9 VITAMIN D DEFICIENCY: ICD-10-CM

## 2017-11-10 DIAGNOSIS — E66.9 OBESITY: ICD-10-CM

## 2017-11-21 ENCOUNTER — COMMUNICATION - HEALTHEAST (OUTPATIENT)
Dept: FAMILY MEDICINE | Facility: CLINIC | Age: 47
End: 2017-11-21

## 2017-11-21 DIAGNOSIS — J45.909 ASTHMA: ICD-10-CM

## 2017-12-05 ENCOUNTER — COMMUNICATION - HEALTHEAST (OUTPATIENT)
Dept: FAMILY MEDICINE | Facility: CLINIC | Age: 47
End: 2017-12-05

## 2017-12-05 DIAGNOSIS — M60.9 MYOSITIS: ICD-10-CM

## 2017-12-05 DIAGNOSIS — M79.10 MYALGIA: ICD-10-CM

## 2017-12-09 ENCOUNTER — COMMUNICATION - HEALTHEAST (OUTPATIENT)
Dept: FAMILY MEDICINE | Facility: CLINIC | Age: 47
End: 2017-12-09

## 2017-12-09 DIAGNOSIS — Z87.891 PERSONAL HISTORY OF TOBACCO USE, PRESENTING HAZARDS TO HEALTH: ICD-10-CM

## 2017-12-11 ENCOUNTER — COMMUNICATION - HEALTHEAST (OUTPATIENT)
Dept: FAMILY MEDICINE | Facility: CLINIC | Age: 47
End: 2017-12-11

## 2017-12-11 DIAGNOSIS — E55.9 VITAMIN D DEFICIENCY: ICD-10-CM

## 2017-12-11 DIAGNOSIS — R06.83 SNORING: ICD-10-CM

## 2017-12-11 DIAGNOSIS — E78.00 PURE HYPERCHOLESTEROLEMIA: ICD-10-CM

## 2017-12-11 DIAGNOSIS — E66.9 OBESITY: ICD-10-CM

## 2017-12-14 ENCOUNTER — COMMUNICATION - HEALTHEAST (OUTPATIENT)
Dept: FAMILY MEDICINE | Facility: CLINIC | Age: 47
End: 2017-12-14

## 2017-12-14 DIAGNOSIS — K21.9 ACID REFLUX: ICD-10-CM

## 2017-12-15 ENCOUNTER — OFFICE VISIT - HEALTHEAST (OUTPATIENT)
Dept: PULMONOLOGY | Facility: OTHER | Age: 47
End: 2017-12-15

## 2017-12-15 DIAGNOSIS — R91.1 LUNG NODULE: ICD-10-CM

## 2017-12-15 DIAGNOSIS — R94.2 ABNORMAL PET OF LEFT LUNG: ICD-10-CM

## 2017-12-15 DIAGNOSIS — J30.9 ALLERGIC RHINITIS, UNSPECIFIED CHRONICITY, UNSPECIFIED SEASONALITY, UNSPECIFIED TRIGGER: ICD-10-CM

## 2017-12-15 DIAGNOSIS — J45.20 MILD INTERMITTENT ASTHMA WITHOUT COMPLICATION: ICD-10-CM

## 2017-12-28 ENCOUNTER — COMMUNICATION - HEALTHEAST (OUTPATIENT)
Dept: FAMILY MEDICINE | Facility: CLINIC | Age: 47
End: 2017-12-28

## 2017-12-28 DIAGNOSIS — F41.1 GENERALIZED ANXIETY DISORDER: ICD-10-CM

## 2018-01-15 ENCOUNTER — OFFICE VISIT - HEALTHEAST (OUTPATIENT)
Dept: FAMILY MEDICINE | Facility: CLINIC | Age: 48
End: 2018-01-15

## 2018-01-15 DIAGNOSIS — E66.9 OBESITY: ICD-10-CM

## 2018-01-15 DIAGNOSIS — E78.00 PURE HYPERCHOLESTEROLEMIA: ICD-10-CM

## 2018-01-15 DIAGNOSIS — E55.9 VITAMIN D DEFICIENCY: ICD-10-CM

## 2018-01-15 DIAGNOSIS — R06.83 SNORING: ICD-10-CM

## 2018-01-24 ENCOUNTER — OFFICE VISIT - HEALTHEAST (OUTPATIENT)
Dept: FAMILY MEDICINE | Facility: CLINIC | Age: 48
End: 2018-01-24

## 2018-01-24 DIAGNOSIS — F41.1 GENERALIZED ANXIETY DISORDER: ICD-10-CM

## 2018-01-25 ENCOUNTER — COMMUNICATION - HEALTHEAST (OUTPATIENT)
Dept: FAMILY MEDICINE | Facility: CLINIC | Age: 48
End: 2018-01-25

## 2018-01-25 DIAGNOSIS — M79.10 MYALGIA: ICD-10-CM

## 2018-01-25 DIAGNOSIS — M60.9 MYOSITIS: ICD-10-CM

## 2018-02-01 ENCOUNTER — HOSPITAL ENCOUNTER (OUTPATIENT)
Dept: MAMMOGRAPHY | Facility: HOSPITAL | Age: 48
Discharge: HOME | End: 2018-02-01
Attending: FAMILY MEDICINE

## 2018-02-01 DIAGNOSIS — Z12.31 VISIT FOR SCREENING MAMMOGRAM: ICD-10-CM

## 2018-02-13 ENCOUNTER — COMMUNICATION - HEALTHEAST (OUTPATIENT)
Dept: FAMILY MEDICINE | Facility: CLINIC | Age: 48
End: 2018-02-13

## 2018-02-13 DIAGNOSIS — R06.83 SNORING: ICD-10-CM

## 2018-02-13 DIAGNOSIS — E55.9 VITAMIN D DEFICIENCY: ICD-10-CM

## 2018-02-13 DIAGNOSIS — E66.9 OBESITY: ICD-10-CM

## 2018-02-13 DIAGNOSIS — E78.00 PURE HYPERCHOLESTEROLEMIA: ICD-10-CM

## 2018-02-13 DIAGNOSIS — F41.1 GENERALIZED ANXIETY DISORDER: ICD-10-CM

## 2018-02-28 ENCOUNTER — OFFICE VISIT - HEALTHEAST (OUTPATIENT)
Dept: FAMILY MEDICINE | Facility: CLINIC | Age: 48
End: 2018-02-28

## 2018-02-28 DIAGNOSIS — M79.7 FIBROMYALGIA: ICD-10-CM

## 2018-02-28 DIAGNOSIS — F41.1 GENERALIZED ANXIETY DISORDER: ICD-10-CM

## 2018-03-07 ENCOUNTER — OFFICE VISIT - HEALTHEAST (OUTPATIENT)
Dept: FAMILY MEDICINE | Facility: CLINIC | Age: 48
End: 2018-03-07

## 2018-03-07 DIAGNOSIS — N89.8 VAGINAL DRYNESS: ICD-10-CM

## 2018-03-07 DIAGNOSIS — N95.1 HOT FLASHES DUE TO MENOPAUSE: ICD-10-CM

## 2018-03-11 ENCOUNTER — COMMUNICATION - HEALTHEAST (OUTPATIENT)
Dept: FAMILY MEDICINE | Facility: CLINIC | Age: 48
End: 2018-03-11

## 2018-03-11 DIAGNOSIS — F41.1 GENERALIZED ANXIETY DISORDER: ICD-10-CM

## 2018-04-02 ENCOUNTER — COMMUNICATION - HEALTHEAST (OUTPATIENT)
Dept: FAMILY MEDICINE | Facility: CLINIC | Age: 48
End: 2018-04-02

## 2018-04-02 DIAGNOSIS — E55.9 VITAMIN D DEFICIENCY: ICD-10-CM

## 2018-04-02 DIAGNOSIS — F41.1 GENERALIZED ANXIETY DISORDER: ICD-10-CM

## 2018-04-02 DIAGNOSIS — M79.10 MYALGIA: ICD-10-CM

## 2018-04-02 DIAGNOSIS — M60.9 MYOSITIS: ICD-10-CM

## 2018-04-02 DIAGNOSIS — R06.83 SNORING: ICD-10-CM

## 2018-04-02 DIAGNOSIS — E66.9 OBESITY: ICD-10-CM

## 2018-04-02 DIAGNOSIS — E78.00 PURE HYPERCHOLESTEROLEMIA: ICD-10-CM

## 2018-05-19 ENCOUNTER — COMMUNICATION - HEALTHEAST (OUTPATIENT)
Dept: FAMILY MEDICINE | Facility: CLINIC | Age: 48
End: 2018-05-19

## 2018-05-19 DIAGNOSIS — M79.10 MYALGIA: ICD-10-CM

## 2018-05-19 DIAGNOSIS — F41.1 GENERALIZED ANXIETY DISORDER: ICD-10-CM

## 2018-05-19 DIAGNOSIS — M60.9 MYOSITIS: ICD-10-CM

## 2018-05-25 ENCOUNTER — COMMUNICATION - HEALTHEAST (OUTPATIENT)
Dept: FAMILY MEDICINE | Facility: CLINIC | Age: 48
End: 2018-05-25

## 2018-05-25 ENCOUNTER — OFFICE VISIT - HEALTHEAST (OUTPATIENT)
Dept: FAMILY MEDICINE | Facility: CLINIC | Age: 48
End: 2018-05-25

## 2018-05-25 DIAGNOSIS — E78.00 PURE HYPERCHOLESTEROLEMIA: ICD-10-CM

## 2018-05-25 DIAGNOSIS — R06.83 SNORING: ICD-10-CM

## 2018-05-25 DIAGNOSIS — E66.9 OBESITY: ICD-10-CM

## 2018-05-25 DIAGNOSIS — E55.9 VITAMIN D DEFICIENCY: ICD-10-CM

## 2018-05-25 DIAGNOSIS — E78.5 HYPERLIPEMIA: ICD-10-CM

## 2018-06-07 ENCOUNTER — COMMUNICATION - HEALTHEAST (OUTPATIENT)
Dept: FAMILY MEDICINE | Facility: CLINIC | Age: 48
End: 2018-06-07

## 2018-06-08 ENCOUNTER — COMMUNICATION - HEALTHEAST (OUTPATIENT)
Dept: FAMILY MEDICINE | Facility: CLINIC | Age: 48
End: 2018-06-08

## 2018-06-08 DIAGNOSIS — K21.00 GASTROESOPHAGEAL REFLUX DISEASE WITH ESOPHAGITIS: ICD-10-CM

## 2018-06-08 DIAGNOSIS — K21.9 ACID REFLUX: ICD-10-CM

## 2018-06-14 ENCOUNTER — COMMUNICATION - HEALTHEAST (OUTPATIENT)
Dept: FAMILY MEDICINE | Facility: CLINIC | Age: 48
End: 2018-06-14

## 2018-06-14 DIAGNOSIS — M60.9 MYOSITIS: ICD-10-CM

## 2018-06-14 DIAGNOSIS — M79.10 MYALGIA: ICD-10-CM

## 2018-06-22 ENCOUNTER — COMMUNICATION - HEALTHEAST (OUTPATIENT)
Dept: FAMILY MEDICINE | Facility: CLINIC | Age: 48
End: 2018-06-22

## 2018-06-22 DIAGNOSIS — E66.9 OBESITY: ICD-10-CM

## 2018-06-22 DIAGNOSIS — R06.83 SNORING: ICD-10-CM

## 2018-06-22 DIAGNOSIS — E55.9 VITAMIN D DEFICIENCY: ICD-10-CM

## 2018-06-22 DIAGNOSIS — E78.00 PURE HYPERCHOLESTEROLEMIA: ICD-10-CM

## 2018-06-29 ENCOUNTER — OFFICE VISIT - HEALTHEAST (OUTPATIENT)
Dept: FAMILY MEDICINE | Facility: CLINIC | Age: 48
End: 2018-06-29

## 2018-06-29 DIAGNOSIS — R06.83 SNORING: ICD-10-CM

## 2018-06-29 DIAGNOSIS — E78.00 PURE HYPERCHOLESTEROLEMIA: ICD-10-CM

## 2018-06-29 DIAGNOSIS — E55.9 VITAMIN D DEFICIENCY: ICD-10-CM

## 2018-06-29 DIAGNOSIS — E66.9 OBESITY: ICD-10-CM

## 2018-07-04 ENCOUNTER — COMMUNICATION - HEALTHEAST (OUTPATIENT)
Dept: FAMILY MEDICINE | Facility: CLINIC | Age: 48
End: 2018-07-04

## 2018-07-04 DIAGNOSIS — F41.1 GENERALIZED ANXIETY DISORDER: ICD-10-CM

## 2018-08-01 ENCOUNTER — COMMUNICATION - HEALTHEAST (OUTPATIENT)
Dept: FAMILY MEDICINE | Facility: CLINIC | Age: 48
End: 2018-08-01

## 2018-08-05 ENCOUNTER — COMMUNICATION - HEALTHEAST (OUTPATIENT)
Dept: SCHEDULING | Facility: CLINIC | Age: 48
End: 2018-08-05

## 2018-08-24 ENCOUNTER — COMMUNICATION - HEALTHEAST (OUTPATIENT)
Dept: FAMILY MEDICINE | Facility: CLINIC | Age: 48
End: 2018-08-24

## 2018-08-24 ENCOUNTER — OFFICE VISIT - HEALTHEAST (OUTPATIENT)
Dept: FAMILY MEDICINE | Facility: CLINIC | Age: 48
End: 2018-08-24

## 2018-08-24 DIAGNOSIS — Z71.1 CONCERN ABOUT DISEASE WITHOUT DIAGNOSIS: ICD-10-CM

## 2018-08-24 DIAGNOSIS — R06.83 SNORING: ICD-10-CM

## 2018-08-24 DIAGNOSIS — E78.00 PURE HYPERCHOLESTEROLEMIA: ICD-10-CM

## 2018-08-24 DIAGNOSIS — E66.9 OBESITY: ICD-10-CM

## 2018-08-24 DIAGNOSIS — R63.5 WEIGHT GAIN: ICD-10-CM

## 2018-08-24 DIAGNOSIS — E55.9 VITAMIN D DEFICIENCY: ICD-10-CM

## 2018-08-27 ENCOUNTER — COMMUNICATION - HEALTHEAST (OUTPATIENT)
Dept: FAMILY MEDICINE | Facility: CLINIC | Age: 48
End: 2018-08-27

## 2018-08-27 ENCOUNTER — AMBULATORY - HEALTHEAST (OUTPATIENT)
Dept: FAMILY MEDICINE | Facility: CLINIC | Age: 48
End: 2018-08-27

## 2018-08-27 DIAGNOSIS — E55.9 VITAMIN D DEFICIENCY: ICD-10-CM

## 2018-08-27 DIAGNOSIS — E66.9 OBESITY: ICD-10-CM

## 2018-08-27 DIAGNOSIS — F41.1 GENERALIZED ANXIETY DISORDER: ICD-10-CM

## 2018-08-27 DIAGNOSIS — R06.83 SNORING: ICD-10-CM

## 2018-08-27 DIAGNOSIS — E78.00 PURE HYPERCHOLESTEROLEMIA: ICD-10-CM

## 2018-08-31 ENCOUNTER — COMMUNICATION - HEALTHEAST (OUTPATIENT)
Dept: FAMILY MEDICINE | Facility: CLINIC | Age: 48
End: 2018-08-31

## 2018-10-16 ENCOUNTER — COMMUNICATION - HEALTHEAST (OUTPATIENT)
Dept: FAMILY MEDICINE | Facility: CLINIC | Age: 48
End: 2018-10-16

## 2018-10-21 ENCOUNTER — COMMUNICATION - HEALTHEAST (OUTPATIENT)
Dept: FAMILY MEDICINE | Facility: CLINIC | Age: 48
End: 2018-10-21

## 2018-10-21 DIAGNOSIS — K21.9 ACID REFLUX: ICD-10-CM

## 2018-11-27 ENCOUNTER — COMMUNICATION - HEALTHEAST (OUTPATIENT)
Dept: FAMILY MEDICINE | Facility: CLINIC | Age: 48
End: 2018-11-27

## 2018-11-27 DIAGNOSIS — F41.1 GENERALIZED ANXIETY DISORDER: ICD-10-CM

## 2018-11-27 DIAGNOSIS — M60.9 MYOSITIS: ICD-10-CM

## 2018-11-27 DIAGNOSIS — M79.10 MYALGIA: ICD-10-CM

## 2018-12-14 ENCOUNTER — OFFICE VISIT - HEALTHEAST (OUTPATIENT)
Dept: FAMILY MEDICINE | Facility: CLINIC | Age: 48
End: 2018-12-14

## 2018-12-14 DIAGNOSIS — L40.50 PSORIATIC ARTHRITIS (H): ICD-10-CM

## 2018-12-14 DIAGNOSIS — E55.9 VITAMIN D DEFICIENCY: ICD-10-CM

## 2018-12-14 DIAGNOSIS — E66.01 CLASS 2 SEVERE OBESITY DUE TO EXCESS CALORIES WITH SERIOUS COMORBIDITY AND BODY MASS INDEX (BMI) OF 36.0 TO 36.9 IN ADULT (H): ICD-10-CM

## 2018-12-14 DIAGNOSIS — E78.00 PURE HYPERCHOLESTEROLEMIA: ICD-10-CM

## 2018-12-14 DIAGNOSIS — E66.812 CLASS 2 SEVERE OBESITY DUE TO EXCESS CALORIES WITH SERIOUS COMORBIDITY AND BODY MASS INDEX (BMI) OF 36.0 TO 36.9 IN ADULT (H): ICD-10-CM

## 2018-12-14 DIAGNOSIS — R06.83 SNORING: ICD-10-CM

## 2018-12-14 ASSESSMENT — MIFFLIN-ST. JEOR: SCORE: 1674.36

## 2019-01-10 ENCOUNTER — COMMUNICATION - HEALTHEAST (OUTPATIENT)
Dept: PULMONOLOGY | Facility: OTHER | Age: 49
End: 2019-01-10

## 2019-01-10 DIAGNOSIS — J30.9 ALLERGIC RHINITIS: ICD-10-CM

## 2019-01-11 ENCOUNTER — OFFICE VISIT - HEALTHEAST (OUTPATIENT)
Dept: FAMILY MEDICINE | Facility: CLINIC | Age: 49
End: 2019-01-11

## 2019-01-11 DIAGNOSIS — E66.09 CLASS 1 OBESITY DUE TO EXCESS CALORIES WITH SERIOUS COMORBIDITY AND BODY MASS INDEX (BMI) OF 34.0 TO 34.9 IN ADULT: ICD-10-CM

## 2019-01-11 DIAGNOSIS — J30.9 ALLERGIC RHINITIS: ICD-10-CM

## 2019-01-11 DIAGNOSIS — E66.811 CLASS 1 OBESITY DUE TO EXCESS CALORIES WITH SERIOUS COMORBIDITY AND BODY MASS INDEX (BMI) OF 34.0 TO 34.9 IN ADULT: ICD-10-CM

## 2019-01-11 DIAGNOSIS — F50.20 BULIMIA NERVOSA: ICD-10-CM

## 2019-01-11 DIAGNOSIS — R06.83 SNORING: ICD-10-CM

## 2019-01-11 DIAGNOSIS — E78.00 PURE HYPERCHOLESTEROLEMIA: ICD-10-CM

## 2019-01-11 DIAGNOSIS — K21.9 GASTROESOPHAGEAL REFLUX DISEASE, ESOPHAGITIS PRESENCE NOT SPECIFIED: ICD-10-CM

## 2019-02-04 ENCOUNTER — COMMUNICATION - HEALTHEAST (OUTPATIENT)
Dept: FAMILY MEDICINE | Facility: CLINIC | Age: 49
End: 2019-02-04

## 2019-02-04 DIAGNOSIS — F41.1 GENERALIZED ANXIETY DISORDER: ICD-10-CM

## 2019-02-05 ENCOUNTER — COMMUNICATION - HEALTHEAST (OUTPATIENT)
Dept: FAMILY MEDICINE | Facility: CLINIC | Age: 49
End: 2019-02-05

## 2019-02-05 DIAGNOSIS — E66.09 CLASS 1 OBESITY DUE TO EXCESS CALORIES WITH SERIOUS COMORBIDITY AND BODY MASS INDEX (BMI) OF 34.0 TO 34.9 IN ADULT: ICD-10-CM

## 2019-02-05 DIAGNOSIS — E66.811 CLASS 1 OBESITY DUE TO EXCESS CALORIES WITH SERIOUS COMORBIDITY AND BODY MASS INDEX (BMI) OF 34.0 TO 34.9 IN ADULT: ICD-10-CM

## 2019-02-07 ENCOUNTER — COMMUNICATION - HEALTHEAST (OUTPATIENT)
Dept: FAMILY MEDICINE | Facility: CLINIC | Age: 49
End: 2019-02-07

## 2019-02-07 DIAGNOSIS — F41.1 GENERALIZED ANXIETY DISORDER: ICD-10-CM

## 2019-02-14 ENCOUNTER — COMMUNICATION - HEALTHEAST (OUTPATIENT)
Dept: FAMILY MEDICINE | Facility: CLINIC | Age: 49
End: 2019-02-14

## 2019-02-14 DIAGNOSIS — F41.1 GAD (GENERALIZED ANXIETY DISORDER): ICD-10-CM

## 2019-02-15 ENCOUNTER — OFFICE VISIT - HEALTHEAST (OUTPATIENT)
Dept: FAMILY MEDICINE | Facility: CLINIC | Age: 49
End: 2019-02-15

## 2019-02-15 DIAGNOSIS — R06.83 SNORING: ICD-10-CM

## 2019-02-15 DIAGNOSIS — E66.811 CLASS 1 OBESITY DUE TO EXCESS CALORIES WITH SERIOUS COMORBIDITY AND BODY MASS INDEX (BMI) OF 34.0 TO 34.9 IN ADULT: ICD-10-CM

## 2019-02-15 DIAGNOSIS — Z12.31 VISIT FOR SCREENING MAMMOGRAM: ICD-10-CM

## 2019-02-15 DIAGNOSIS — E78.00 PURE HYPERCHOLESTEROLEMIA: ICD-10-CM

## 2019-02-15 DIAGNOSIS — E66.09 CLASS 1 OBESITY DUE TO EXCESS CALORIES WITH SERIOUS COMORBIDITY AND BODY MASS INDEX (BMI) OF 34.0 TO 34.9 IN ADULT: ICD-10-CM

## 2019-02-15 DIAGNOSIS — E55.9 VITAMIN D DEFICIENCY: ICD-10-CM

## 2019-03-05 ENCOUNTER — COMMUNICATION - HEALTHEAST (OUTPATIENT)
Dept: FAMILY MEDICINE | Facility: CLINIC | Age: 49
End: 2019-03-05

## 2019-03-05 DIAGNOSIS — E66.811 CLASS 1 OBESITY DUE TO EXCESS CALORIES WITH SERIOUS COMORBIDITY AND BODY MASS INDEX (BMI) OF 34.0 TO 34.9 IN ADULT: ICD-10-CM

## 2019-03-05 DIAGNOSIS — E66.09 CLASS 1 OBESITY DUE TO EXCESS CALORIES WITH SERIOUS COMORBIDITY AND BODY MASS INDEX (BMI) OF 34.0 TO 34.9 IN ADULT: ICD-10-CM

## 2019-03-15 ENCOUNTER — COMMUNICATION - HEALTHEAST (OUTPATIENT)
Dept: FAMILY MEDICINE | Facility: CLINIC | Age: 49
End: 2019-03-15

## 2019-03-17 ENCOUNTER — COMMUNICATION - HEALTHEAST (OUTPATIENT)
Dept: SCHEDULING | Facility: CLINIC | Age: 49
End: 2019-03-17

## 2019-03-17 DIAGNOSIS — M79.10 MYALGIA: ICD-10-CM

## 2019-03-17 DIAGNOSIS — M60.9 MYOSITIS: ICD-10-CM

## 2019-04-02 ENCOUNTER — COMMUNICATION - HEALTHEAST (OUTPATIENT)
Dept: FAMILY MEDICINE | Facility: CLINIC | Age: 49
End: 2019-04-02

## 2019-04-02 DIAGNOSIS — F41.1 GENERALIZED ANXIETY DISORDER: ICD-10-CM

## 2019-04-26 ENCOUNTER — COMMUNICATION - HEALTHEAST (OUTPATIENT)
Dept: SCHEDULING | Facility: CLINIC | Age: 49
End: 2019-04-26

## 2019-04-26 ENCOUNTER — OFFICE VISIT - HEALTHEAST (OUTPATIENT)
Dept: FAMILY MEDICINE | Facility: CLINIC | Age: 49
End: 2019-04-26

## 2019-04-26 DIAGNOSIS — T14.8XXA PUNCTURE WOUND: ICD-10-CM

## 2019-04-26 ASSESSMENT — MIFFLIN-ST. JEOR: SCORE: 1672.37

## 2019-05-04 ENCOUNTER — COMMUNICATION - HEALTHEAST (OUTPATIENT)
Dept: FAMILY MEDICINE | Facility: CLINIC | Age: 49
End: 2019-05-04

## 2019-05-04 DIAGNOSIS — J30.9 ALLERGIC RHINITIS: ICD-10-CM

## 2019-05-13 ENCOUNTER — OFFICE VISIT - HEALTHEAST (OUTPATIENT)
Dept: PODIATRY | Facility: CLINIC | Age: 49
End: 2019-05-13

## 2019-05-13 DIAGNOSIS — L57.0 KERATOMA: ICD-10-CM

## 2019-05-13 ASSESSMENT — MIFFLIN-ST. JEOR: SCORE: 1669.82

## 2019-06-13 ENCOUNTER — COMMUNICATION - HEALTHEAST (OUTPATIENT)
Dept: FAMILY MEDICINE | Facility: CLINIC | Age: 49
End: 2019-06-13

## 2019-06-13 DIAGNOSIS — E78.5 HYPERLIPEMIA: ICD-10-CM

## 2019-06-21 ENCOUNTER — COMMUNICATION - HEALTHEAST (OUTPATIENT)
Dept: FAMILY MEDICINE | Facility: CLINIC | Age: 49
End: 2019-06-21

## 2019-06-21 DIAGNOSIS — J30.9 ALLERGIC RHINITIS: ICD-10-CM

## 2019-06-21 DIAGNOSIS — F41.1 GENERALIZED ANXIETY DISORDER: ICD-10-CM

## 2019-06-27 ENCOUNTER — COMMUNICATION - HEALTHEAST (OUTPATIENT)
Dept: SCHEDULING | Facility: CLINIC | Age: 49
End: 2019-06-27

## 2019-06-27 DIAGNOSIS — R52 PAIN: ICD-10-CM

## 2019-07-03 ENCOUNTER — OFFICE VISIT - HEALTHEAST (OUTPATIENT)
Dept: FAMILY MEDICINE | Facility: CLINIC | Age: 49
End: 2019-07-03

## 2019-07-03 DIAGNOSIS — F41.1 GENERALIZED ANXIETY DISORDER: ICD-10-CM

## 2019-07-03 DIAGNOSIS — M79.7 FIBROMYALGIA: ICD-10-CM

## 2019-07-03 DIAGNOSIS — Z12.31 VISIT FOR SCREENING MAMMOGRAM: ICD-10-CM

## 2019-07-03 DIAGNOSIS — F33.0 MILD EPISODE OF RECURRENT MAJOR DEPRESSIVE DISORDER (H): ICD-10-CM

## 2019-07-03 DIAGNOSIS — L40.50 PSORIATIC ARTHRITIS (H): ICD-10-CM

## 2019-07-03 DIAGNOSIS — Z00.00 ROUTINE GENERAL MEDICAL EXAMINATION AT A HEALTH CARE FACILITY: ICD-10-CM

## 2019-07-03 DIAGNOSIS — E66.09 CLASS 1 OBESITY DUE TO EXCESS CALORIES WITH SERIOUS COMORBIDITY AND BODY MASS INDEX (BMI) OF 34.0 TO 34.9 IN ADULT: ICD-10-CM

## 2019-07-03 DIAGNOSIS — J45.30 MILD PERSISTENT ASTHMA WITHOUT COMPLICATION: ICD-10-CM

## 2019-07-03 DIAGNOSIS — E66.811 CLASS 1 OBESITY DUE TO EXCESS CALORIES WITH SERIOUS COMORBIDITY AND BODY MASS INDEX (BMI) OF 34.0 TO 34.9 IN ADULT: ICD-10-CM

## 2019-07-03 DIAGNOSIS — E78.00 PURE HYPERCHOLESTEROLEMIA: ICD-10-CM

## 2019-07-03 ASSESSMENT — MIFFLIN-ST. JEOR: SCORE: 1611.7

## 2019-07-11 ENCOUNTER — COMMUNICATION - HEALTHEAST (OUTPATIENT)
Dept: FAMILY MEDICINE | Facility: CLINIC | Age: 49
End: 2019-07-11

## 2019-07-11 DIAGNOSIS — E66.811 CLASS 1 OBESITY DUE TO EXCESS CALORIES WITH SERIOUS COMORBIDITY AND BODY MASS INDEX (BMI) OF 34.0 TO 34.9 IN ADULT: ICD-10-CM

## 2019-07-11 DIAGNOSIS — E66.09 CLASS 1 OBESITY DUE TO EXCESS CALORIES WITH SERIOUS COMORBIDITY AND BODY MASS INDEX (BMI) OF 34.0 TO 34.9 IN ADULT: ICD-10-CM

## 2019-07-19 ENCOUNTER — RECORDS - HEALTHEAST (OUTPATIENT)
Dept: SCHEDULING | Facility: CLINIC | Age: 49
End: 2019-07-19

## 2019-07-19 ENCOUNTER — COMMUNICATION - HEALTHEAST (OUTPATIENT)
Dept: FAMILY MEDICINE | Facility: CLINIC | Age: 49
End: 2019-07-19

## 2019-07-19 DIAGNOSIS — J30.9 ALLERGIC RHINITIS: ICD-10-CM

## 2019-07-26 ENCOUNTER — COMMUNICATION - HEALTHEAST (OUTPATIENT)
Dept: SCHEDULING | Facility: CLINIC | Age: 49
End: 2019-07-26

## 2019-07-26 DIAGNOSIS — R52 PAIN: ICD-10-CM

## 2019-09-05 ENCOUNTER — HOSPITAL ENCOUNTER (OUTPATIENT)
Dept: MAMMOGRAPHY | Facility: CLINIC | Age: 49
Discharge: HOME | End: 2019-09-05
Attending: FAMILY MEDICINE

## 2019-09-05 DIAGNOSIS — Z12.31 VISIT FOR SCREENING MAMMOGRAM: ICD-10-CM

## 2019-09-11 ENCOUNTER — HOSPITAL ENCOUNTER (OUTPATIENT)
Dept: MAMMOGRAPHY | Facility: CLINIC | Age: 49
Discharge: HOME | End: 2019-09-11
Attending: FAMILY MEDICINE

## 2019-09-11 DIAGNOSIS — N63.0 BREAST MASS: ICD-10-CM

## 2019-09-25 ENCOUNTER — OFFICE VISIT - HEALTHEAST (OUTPATIENT)
Dept: CARDIOLOGY | Facility: CLINIC | Age: 49
End: 2019-09-25

## 2019-09-25 DIAGNOSIS — R07.9 ACUTE CHEST PAIN: ICD-10-CM

## 2019-09-25 DIAGNOSIS — R07.89 OTHER CHEST PAIN: ICD-10-CM

## 2019-09-25 ASSESSMENT — MIFFLIN-ST. JEOR: SCORE: 1596.34

## 2019-09-26 ENCOUNTER — COMMUNICATION - HEALTHEAST (OUTPATIENT)
Dept: SCHEDULING | Facility: CLINIC | Age: 49
End: 2019-09-26

## 2019-10-04 ENCOUNTER — HOSPITAL ENCOUNTER (OUTPATIENT)
Dept: CARDIOLOGY | Facility: HOSPITAL | Age: 49
Discharge: HOME | End: 2019-10-04
Attending: INTERNAL MEDICINE

## 2019-10-04 ENCOUNTER — COMMUNICATION - HEALTHEAST (OUTPATIENT)
Dept: FAMILY MEDICINE | Facility: CLINIC | Age: 49
End: 2019-10-04

## 2019-10-04 DIAGNOSIS — R07.9 ACUTE CHEST PAIN: ICD-10-CM

## 2019-10-04 DIAGNOSIS — E78.5 HYPERLIPEMIA: ICD-10-CM

## 2019-10-04 LAB
CV STRESS CURRENT BP HE: NORMAL
CV STRESS CURRENT HR HE: 100
CV STRESS CURRENT HR HE: 100
CV STRESS CURRENT HR HE: 102
CV STRESS CURRENT HR HE: 102
CV STRESS CURRENT HR HE: 109
CV STRESS CURRENT HR HE: 112
CV STRESS CURRENT HR HE: 112
CV STRESS CURRENT HR HE: 113
CV STRESS CURRENT HR HE: 114
CV STRESS CURRENT HR HE: 124
CV STRESS CURRENT HR HE: 130
CV STRESS CURRENT HR HE: 131
CV STRESS CURRENT HR HE: 141
CV STRESS CURRENT HR HE: 142
CV STRESS CURRENT HR HE: 147
CV STRESS CURRENT HR HE: 147
CV STRESS CURRENT HR HE: 152
CV STRESS CURRENT HR HE: 165
CV STRESS CURRENT HR HE: 92
CV STRESS CURRENT HR HE: 95
CV STRESS CURRENT HR HE: 96
CV STRESS CURRENT HR HE: 97
CV STRESS DEVIATION TIME HE: NORMAL
CV STRESS ECHO PERCENT HR HE: NORMAL
CV STRESS EXERCISE STAGE HE: NORMAL
CV STRESS FINAL RESTING BP HE: NORMAL
CV STRESS FINAL RESTING HR HE: 102
CV STRESS MAX HR HE: 166
CV STRESS MAX TREADMILL GRADE HE: 14
CV STRESS MAX TREADMILL SPEED HE: 3.4
CV STRESS PEAK DIA BP HE: NORMAL
CV STRESS PEAK SYS BP HE: NORMAL
CV STRESS PHASE HE: NORMAL
CV STRESS PROTOCOL HE: NORMAL
CV STRESS RESTING PT POSITION HE: NORMAL
CV STRESS RESTING PT POSITION HE: NORMAL
CV STRESS ST DEVIATION AMOUNT HE: NORMAL
CV STRESS ST DEVIATION ELEVATION HE: NORMAL
CV STRESS ST EVELATION AMOUNT HE: NORMAL
CV STRESS TEST TYPE HE: NORMAL
CV STRESS TOTAL STAGE TIME MIN 1 HE: NORMAL
ECHO EJECTION FRACTION ESTIMATED: 55 %
STRESS ECHO BASELINE BP: NORMAL
STRESS ECHO BASELINE HR: 93
STRESS ECHO CALCULATED PERCENT HR: 97 %
STRESS ECHO LAST STRESS BP: NORMAL
STRESS ECHO LAST STRESS HR: 165
STRESS ECHO POST ESTIMATED WORKLOAD: 8.5
STRESS ECHO POST EXERCISE DUR MIN: 7
STRESS ECHO POST EXERCISE DUR SEC: 0
STRESS ECHO TARGET HR: 146

## 2019-10-07 ENCOUNTER — COMMUNICATION - HEALTHEAST (OUTPATIENT)
Dept: FAMILY MEDICINE | Facility: CLINIC | Age: 49
End: 2019-10-07

## 2019-10-17 ENCOUNTER — COMMUNICATION - HEALTHEAST (OUTPATIENT)
Dept: FAMILY MEDICINE | Facility: CLINIC | Age: 49
End: 2019-10-17

## 2019-10-28 ENCOUNTER — OFFICE VISIT - HEALTHEAST (OUTPATIENT)
Dept: FAMILY MEDICINE | Facility: CLINIC | Age: 49
End: 2019-10-28

## 2019-10-28 DIAGNOSIS — R07.89 ATYPICAL CHEST PAIN: ICD-10-CM

## 2019-10-28 DIAGNOSIS — J30.9 ALLERGIC RHINITIS: ICD-10-CM

## 2019-10-28 DIAGNOSIS — J45.20 MILD INTERMITTENT ASTHMA WITHOUT COMPLICATION: ICD-10-CM

## 2019-10-28 ASSESSMENT — ANXIETY QUESTIONNAIRES
6. BECOMING EASILY ANNOYED OR IRRITABLE: NOT AT ALL
GAD7 TOTAL SCORE: 0
7. FEELING AFRAID AS IF SOMETHING AWFUL MIGHT HAPPEN: NOT AT ALL
4. TROUBLE RELAXING: NOT AT ALL
2. NOT BEING ABLE TO STOP OR CONTROL WORRYING: NOT AT ALL
3. WORRYING TOO MUCH ABOUT DIFFERENT THINGS: NOT AT ALL
1. FEELING NERVOUS, ANXIOUS, OR ON EDGE: NOT AT ALL
5. BEING SO RESTLESS THAT IT IS HARD TO SIT STILL: NOT AT ALL

## 2019-10-28 ASSESSMENT — PATIENT HEALTH QUESTIONNAIRE - PHQ9: SUM OF ALL RESPONSES TO PHQ QUESTIONS 1-9: 2

## 2019-11-25 ENCOUNTER — COMMUNICATION - HEALTHEAST (OUTPATIENT)
Dept: FAMILY MEDICINE | Facility: CLINIC | Age: 49
End: 2019-11-25

## 2019-11-25 DIAGNOSIS — K21.9 ACID REFLUX: ICD-10-CM

## 2019-11-25 DIAGNOSIS — J45.909 ASTHMA: ICD-10-CM

## 2019-12-30 ENCOUNTER — OFFICE VISIT - HEALTHEAST (OUTPATIENT)
Dept: FAMILY MEDICINE | Facility: CLINIC | Age: 49
End: 2019-12-30

## 2019-12-30 DIAGNOSIS — L29.9 SCALP PRURITUS: ICD-10-CM

## 2019-12-30 DIAGNOSIS — N95.1 MENOPAUSAL SYNDROME (HOT FLASHES): ICD-10-CM

## 2019-12-30 LAB
ALBUMIN SERPL-MCNC: 3.7 G/DL (ref 3.5–5)
ALP SERPL-CCNC: 60 U/L (ref 45–120)
ALT SERPL W P-5'-P-CCNC: 28 U/L (ref 0–45)
ANION GAP SERPL CALCULATED.3IONS-SCNC: 6 MMOL/L (ref 5–18)
AST SERPL W P-5'-P-CCNC: 26 U/L (ref 0–40)
BILIRUB SERPL-MCNC: 0.3 MG/DL (ref 0–1)
BUN SERPL-MCNC: 15 MG/DL (ref 8–22)
CALCIUM SERPL-MCNC: 9.6 MG/DL (ref 8.5–10.5)
CHLORIDE BLD-SCNC: 104 MMOL/L (ref 98–107)
CO2 SERPL-SCNC: 31 MMOL/L (ref 22–31)
CREAT SERPL-MCNC: 0.65 MG/DL (ref 0.6–1.1)
ERYTHROCYTE [DISTWIDTH] IN BLOOD BY AUTOMATED COUNT: 12 % (ref 11–14.5)
FSH SERPL-ACNC: 45.2 MIU/ML
GFR SERPL CREATININE-BSD FRML MDRD: >60 ML/MIN/1.73M2
GLUCOSE BLD-MCNC: 88 MG/DL (ref 70–125)
HCT VFR BLD AUTO: 36.8 % (ref 35–47)
HGB BLD-MCNC: 12.5 G/DL (ref 12–16)
MCH RBC QN AUTO: 27.6 PG (ref 27–34)
MCHC RBC AUTO-ENTMCNC: 33.9 G/DL (ref 32–36)
MCV RBC AUTO: 81 FL (ref 80–100)
PLATELET # BLD AUTO: 262 THOU/UL (ref 140–440)
PMV BLD AUTO: 7.2 FL (ref 7–10)
POTASSIUM BLD-SCNC: 3.6 MMOL/L (ref 3.5–5)
PROT SERPL-MCNC: 6.8 G/DL (ref 6–8)
RBC # BLD AUTO: 4.52 MILL/UL (ref 3.8–5.4)
SODIUM SERPL-SCNC: 141 MMOL/L (ref 136–145)
TSH SERPL DL<=0.005 MIU/L-ACNC: 1.84 UIU/ML (ref 0.3–5)
WBC: 7.9 THOU/UL (ref 4–11)

## 2019-12-31 ENCOUNTER — COMMUNICATION - HEALTHEAST (OUTPATIENT)
Dept: FAMILY MEDICINE | Facility: CLINIC | Age: 49
End: 2019-12-31

## 2019-12-31 DIAGNOSIS — N95.1 MENOPAUSAL SYNDROME (HOT FLASHES): ICD-10-CM

## 2020-03-02 ENCOUNTER — COMMUNICATION - HEALTHEAST (OUTPATIENT)
Dept: FAMILY MEDICINE | Facility: CLINIC | Age: 50
End: 2020-03-02

## 2020-03-19 ENCOUNTER — COMMUNICATION - HEALTHEAST (OUTPATIENT)
Dept: SCHEDULING | Facility: CLINIC | Age: 50
End: 2020-03-19

## 2020-03-19 DIAGNOSIS — F41.1 GENERALIZED ANXIETY DISORDER: ICD-10-CM

## 2020-04-13 ENCOUNTER — COMMUNICATION - HEALTHEAST (OUTPATIENT)
Dept: FAMILY MEDICINE | Facility: CLINIC | Age: 50
End: 2020-04-13

## 2020-04-13 DIAGNOSIS — J30.9 ALLERGIC RHINITIS: ICD-10-CM

## 2020-05-07 ENCOUNTER — COMMUNICATION - HEALTHEAST (OUTPATIENT)
Dept: FAMILY MEDICINE | Facility: CLINIC | Age: 50
End: 2020-05-07

## 2020-05-08 ENCOUNTER — OFFICE VISIT - HEALTHEAST (OUTPATIENT)
Dept: FAMILY MEDICINE | Facility: CLINIC | Age: 50
End: 2020-05-08

## 2020-05-08 DIAGNOSIS — E78.00 PURE HYPERCHOLESTEROLEMIA: ICD-10-CM

## 2020-05-08 DIAGNOSIS — R06.83 SNORING: ICD-10-CM

## 2020-05-08 DIAGNOSIS — E66.811 CLASS 1 OBESITY DUE TO EXCESS CALORIES WITH SERIOUS COMORBIDITY AND BODY MASS INDEX (BMI) OF 34.0 TO 34.9 IN ADULT: ICD-10-CM

## 2020-05-08 DIAGNOSIS — E66.09 CLASS 1 OBESITY DUE TO EXCESS CALORIES WITH SERIOUS COMORBIDITY AND BODY MASS INDEX (BMI) OF 34.0 TO 34.9 IN ADULT: ICD-10-CM

## 2020-05-15 ENCOUNTER — COMMUNICATION - HEALTHEAST (OUTPATIENT)
Dept: SCHEDULING | Facility: CLINIC | Age: 50
End: 2020-05-15

## 2020-06-05 ENCOUNTER — OFFICE VISIT - HEALTHEAST (OUTPATIENT)
Dept: FAMILY MEDICINE | Facility: CLINIC | Age: 50
End: 2020-06-05

## 2020-06-05 DIAGNOSIS — E66.09 CLASS 1 OBESITY DUE TO EXCESS CALORIES WITH SERIOUS COMORBIDITY AND BODY MASS INDEX (BMI) OF 34.0 TO 34.9 IN ADULT: ICD-10-CM

## 2020-06-05 DIAGNOSIS — E78.00 PURE HYPERCHOLESTEROLEMIA: ICD-10-CM

## 2020-06-05 DIAGNOSIS — R06.83 SNORING: ICD-10-CM

## 2020-06-05 DIAGNOSIS — E66.811 CLASS 1 OBESITY DUE TO EXCESS CALORIES WITH SERIOUS COMORBIDITY AND BODY MASS INDEX (BMI) OF 34.0 TO 34.9 IN ADULT: ICD-10-CM

## 2020-06-26 ENCOUNTER — COMMUNICATION - HEALTHEAST (OUTPATIENT)
Dept: FAMILY MEDICINE | Facility: CLINIC | Age: 50
End: 2020-06-26

## 2020-07-01 ENCOUNTER — COMMUNICATION - HEALTHEAST (OUTPATIENT)
Dept: FAMILY MEDICINE | Facility: CLINIC | Age: 50
End: 2020-07-01

## 2020-07-01 DIAGNOSIS — F41.1 GAD (GENERALIZED ANXIETY DISORDER): ICD-10-CM

## 2020-07-01 DIAGNOSIS — J30.9 ALLERGIC RHINITIS: ICD-10-CM

## 2020-07-01 DIAGNOSIS — K21.9 ACID REFLUX: ICD-10-CM

## 2020-07-01 DIAGNOSIS — E78.5 HYPERLIPEMIA: ICD-10-CM

## 2020-07-01 DIAGNOSIS — J45.20 MILD INTERMITTENT ASTHMA WITHOUT COMPLICATION: ICD-10-CM

## 2020-07-02 ENCOUNTER — COMMUNICATION - HEALTHEAST (OUTPATIENT)
Dept: FAMILY MEDICINE | Facility: CLINIC | Age: 50
End: 2020-07-02

## 2020-07-03 ENCOUNTER — OFFICE VISIT - HEALTHEAST (OUTPATIENT)
Dept: FAMILY MEDICINE | Facility: CLINIC | Age: 50
End: 2020-07-03

## 2020-07-03 ENCOUNTER — COMMUNICATION - HEALTHEAST (OUTPATIENT)
Dept: FAMILY MEDICINE | Facility: CLINIC | Age: 50
End: 2020-07-03

## 2020-07-03 DIAGNOSIS — E78.00 PURE HYPERCHOLESTEROLEMIA: ICD-10-CM

## 2020-07-03 DIAGNOSIS — E66.811 CLASS 1 OBESITY DUE TO EXCESS CALORIES WITH SERIOUS COMORBIDITY AND BODY MASS INDEX (BMI) OF 33.0 TO 33.9 IN ADULT: ICD-10-CM

## 2020-07-03 DIAGNOSIS — J45.30 MILD PERSISTENT ASTHMA WITHOUT COMPLICATION: ICD-10-CM

## 2020-07-03 DIAGNOSIS — F50.20 BULIMIA NERVOSA: ICD-10-CM

## 2020-07-03 DIAGNOSIS — E55.9 VITAMIN D DEFICIENCY: ICD-10-CM

## 2020-07-03 DIAGNOSIS — E66.09 CLASS 1 OBESITY DUE TO EXCESS CALORIES WITH SERIOUS COMORBIDITY AND BODY MASS INDEX (BMI) OF 33.0 TO 33.9 IN ADULT: ICD-10-CM

## 2020-08-26 ENCOUNTER — COMMUNICATION - HEALTHEAST (OUTPATIENT)
Dept: FAMILY MEDICINE | Facility: CLINIC | Age: 50
End: 2020-08-26

## 2020-08-26 DIAGNOSIS — J45.30 MILD PERSISTENT ASTHMA WITHOUT COMPLICATION: ICD-10-CM

## 2020-10-16 ENCOUNTER — OFFICE VISIT - HEALTHEAST (OUTPATIENT)
Dept: FAMILY MEDICINE | Facility: CLINIC | Age: 50
End: 2020-10-16

## 2020-10-16 DIAGNOSIS — N95.1 MENOPAUSAL SYNDROME (HOT FLASHES): ICD-10-CM

## 2020-10-16 DIAGNOSIS — E78.00 PURE HYPERCHOLESTEROLEMIA: ICD-10-CM

## 2020-10-16 ASSESSMENT — PATIENT HEALTH QUESTIONNAIRE - PHQ9: SUM OF ALL RESPONSES TO PHQ QUESTIONS 1-9: 3

## 2020-11-18 ENCOUNTER — COMMUNICATION - HEALTHEAST (OUTPATIENT)
Dept: FAMILY MEDICINE | Facility: CLINIC | Age: 50
End: 2020-11-18

## 2020-11-18 DIAGNOSIS — F41.1 GENERALIZED ANXIETY DISORDER: ICD-10-CM

## 2020-11-18 DIAGNOSIS — R52 PAIN: ICD-10-CM

## 2020-12-12 ENCOUNTER — COMMUNICATION - HEALTHEAST (OUTPATIENT)
Dept: FAMILY MEDICINE | Facility: CLINIC | Age: 50
End: 2020-12-12

## 2020-12-12 DIAGNOSIS — E78.5 HYPERLIPEMIA: ICD-10-CM

## 2020-12-14 ENCOUNTER — COMMUNICATION - HEALTHEAST (OUTPATIENT)
Dept: FAMILY MEDICINE | Facility: CLINIC | Age: 50
End: 2020-12-14

## 2020-12-14 DIAGNOSIS — J45.909 ASTHMA: ICD-10-CM

## 2020-12-17 ENCOUNTER — COMMUNICATION - HEALTHEAST (OUTPATIENT)
Dept: FAMILY MEDICINE | Facility: CLINIC | Age: 50
End: 2020-12-17

## 2020-12-17 DIAGNOSIS — L29.9 SCALP PRURITUS: ICD-10-CM

## 2021-02-17 ENCOUNTER — COMMUNICATION - HEALTHEAST (OUTPATIENT)
Dept: FAMILY MEDICINE | Facility: CLINIC | Age: 51
End: 2021-02-17

## 2021-02-17 DIAGNOSIS — F41.1 GAD (GENERALIZED ANXIETY DISORDER): ICD-10-CM

## 2021-02-23 ENCOUNTER — VIRTUAL VISIT (OUTPATIENT)
Dept: FAMILY MEDICINE | Facility: CLINIC | Age: 51
End: 2021-02-23
Payer: COMMERCIAL

## 2021-02-23 DIAGNOSIS — Z12.31 VISIT FOR SCREENING MAMMOGRAM: ICD-10-CM

## 2021-02-23 DIAGNOSIS — R05.9 COUGH: ICD-10-CM

## 2021-02-23 DIAGNOSIS — N95.1 MENOPAUSAL SYNDROME (HOT FLASHES): ICD-10-CM

## 2021-02-23 DIAGNOSIS — L21.9 SEBORRHEIC DERMATITIS: ICD-10-CM

## 2021-02-23 DIAGNOSIS — E78.5 HYPERLIPIDEMIA LDL GOAL <130: ICD-10-CM

## 2021-02-23 DIAGNOSIS — L40.50 PSORIATIC ARTHRITIS (H): ICD-10-CM

## 2021-02-23 DIAGNOSIS — Z12.11 SPECIAL SCREENING FOR MALIGNANT NEOPLASMS, COLON: ICD-10-CM

## 2021-02-23 DIAGNOSIS — F41.9 ANXIETY: Primary | ICD-10-CM

## 2021-02-23 PROCEDURE — 99214 OFFICE O/P EST MOD 30 MIN: CPT | Mod: 95 | Performed by: FAMILY MEDICINE

## 2021-02-23 RX ORDER — MONTELUKAST SODIUM 10 MG/1
TABLET ORAL
COMMUNITY
Start: 2020-07-01 | End: 2021-03-02

## 2021-02-23 RX ORDER — KETOCONAZOLE 20 MG/ML
SHAMPOO TOPICAL
COMMUNITY
Start: 2020-11-14 | End: 2021-02-23

## 2021-02-23 RX ORDER — HYDROXYZINE HYDROCHLORIDE 25 MG/1
TABLET, FILM COATED ORAL
COMMUNITY
Start: 2020-12-17 | End: 2021-10-22

## 2021-02-23 RX ORDER — BIOTIN 1 MG
1 TABLET ORAL
COMMUNITY

## 2021-02-23 RX ORDER — PRAVASTATIN SODIUM 10 MG
10 TABLET ORAL DAILY
Qty: 90 TABLET | Refills: 1 | Status: SHIPPED | OUTPATIENT
Start: 2021-02-23 | End: 2021-08-04

## 2021-02-23 RX ORDER — ALPRAZOLAM 0.5 MG
0.5 TABLET ORAL
Qty: 20 TABLET | Refills: 0 | Status: SHIPPED | OUTPATIENT
Start: 2021-02-23 | End: 2021-05-14

## 2021-02-23 RX ORDER — MAGNESIUM 200 MG
1 TABLET ORAL
COMMUNITY

## 2021-02-23 RX ORDER — MULTIPLE VITAMINS W/ MINERALS TAB 9MG-400MCG
1 TAB ORAL
COMMUNITY

## 2021-02-23 RX ORDER — PRAVASTATIN SODIUM 10 MG
TABLET ORAL
COMMUNITY
Start: 2020-12-14 | End: 2021-02-23

## 2021-02-23 RX ORDER — KETOCONAZOLE 20 MG/ML
SHAMPOO TOPICAL
Qty: 120 ML | Refills: 1 | Status: SHIPPED | OUTPATIENT
Start: 2021-02-23 | End: 2021-07-23

## 2021-02-23 RX ORDER — ESTRADIOL 0.5 MG/1
0.5 TABLET ORAL DAILY
Qty: 90 TABLET | Refills: 1 | Status: SHIPPED | OUTPATIENT
Start: 2021-02-23 | End: 2021-08-13

## 2021-02-23 RX ORDER — ALBUTEROL SULFATE 90 UG/1
AEROSOL, METERED RESPIRATORY (INHALATION)
Qty: 1 INHALER | Refills: 1 | Status: SHIPPED | OUTPATIENT
Start: 2021-02-23 | End: 2022-01-14

## 2021-02-23 RX ORDER — ALBUTEROL SULFATE 90 UG/1
AEROSOL, METERED RESPIRATORY (INHALATION)
COMMUNITY
Start: 2020-12-15 | End: 2021-02-23

## 2021-02-23 RX ORDER — BECLOMETHASONE DIPROPIONATE HFA 40 UG/1
AEROSOL, METERED RESPIRATORY (INHALATION)
COMMUNITY
Start: 2020-08-28 | End: 2022-01-14

## 2021-02-23 RX ORDER — ESTRADIOL 0.5 MG/1
0.5 TABLET ORAL DAILY
COMMUNITY
Start: 2020-12-13 | End: 2021-02-23
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
## 2021-02-24 ENCOUNTER — RECORDS - HEALTHEAST (OUTPATIENT)
Dept: FAMILY MEDICINE | Facility: CLINIC | Age: 51
End: 2021-02-24

## 2021-02-24 DIAGNOSIS — Z12.31 VISIT FOR SCREENING MAMMOGRAM: ICD-10-CM

## 2021-03-01 ENCOUNTER — COMMUNICATION - HEALTHEAST (OUTPATIENT)
Dept: FAMILY MEDICINE | Facility: CLINIC | Age: 51
End: 2021-03-01

## 2021-03-01 DIAGNOSIS — J30.9 ALLERGIC RHINITIS: ICD-10-CM

## 2021-03-01 DIAGNOSIS — N95.1 MENOPAUSAL SYNDROME (HOT FLASHES): ICD-10-CM

## 2021-03-02 ENCOUNTER — TELEPHONE (OUTPATIENT)
Dept: FAMILY MEDICINE | Facility: CLINIC | Age: 51
End: 2021-03-02

## 2021-03-02 DIAGNOSIS — J30.2 SEASONAL ALLERGIC RHINITIS, UNSPECIFIED TRIGGER: Primary | ICD-10-CM

## 2021-03-02 RX ORDER — MONTELUKAST SODIUM 10 MG/1
10 TABLET ORAL AT BEDTIME
Qty: 30 TABLET | Refills: 0 | Status: SHIPPED | OUTPATIENT
Start: 2021-03-02 | End: 2022-03-18

## 2021-03-02 RX ORDER — MONTELUKAST SODIUM 10 MG/1
10 TABLET ORAL AT BEDTIME
Qty: 90 TABLET | Refills: 3 | Status: SHIPPED | OUTPATIENT
Start: 2021-03-02 | End: 2021-03-02

## 2021-03-02 NOTE — TELEPHONE ENCOUNTER
Reason for Call:  Medication request:    Do you use a [facility]?  Name of the pharmacy and phone number for the current request:   [facility] (one year of rx))  AND [facility] (one month of rx)    Name of the medication requested: Singulair    Other request: Patient is in need of Singulair, one month sent to [facility] and one year rx sent to [facility]    Can we leave a detailed message on this number? YES    Phone number patient can be reached at: Home number on file [phone] (home)    Best Time: Any    Call taken on 3/2/2021 at 5:02 PM by Alexandra Medina

## 2021-03-02 NOTE — CONFIDENTIAL NOTE
Routing refill request to provider for review/approval because:  Medication is reported/historical  Hermelindo Obregon RN

## 2021-04-05 NOTE — PROGRESS NOTES
"RHEUMATOLOGY INITIAL VIDEO CONSULT NOTE    Chief Complaint: PsA    Reason for consult: Dr. Lamar Mejía from  has requested consultation for this patient for PsA    Video visit may not be as thorough as an in-person visit and physical exam limitations may pose a challenge in formulating an accurate diagnosis.     History of Present Illness:    Sonya Mosqueda is a 50 year old female with pmhx psoriasis, fibromyalgia, PsA, pulmonary nodule w/ non-necrotizing granulomatous inflammation in 2017, CTS s/p surgery, is referred to rheumatology clinic for PsA.     Last rheumatology visit was 2018 with Dr Herminia Alcantara. She is not on any medications at this time. She reports pain over her b/l hands and wrists more consistently. She feels like she can't hold things. These symptoms have gotten worse over the last 3 months. She feels like she does not have enough strength in her hands. She gets intermittent pain over her shoulders, hips off an on but that is not consistent. She has been using diclofenac gel over the hands but it has not helped too much. She feels there is some swelling over the wrists and hand joints and feels like there are \"bone spurs\" coming out. She reports some AM stiffness which lasts minutes to sometimes an hour, but she goes for a walk right away with her dogs. Occasionally, her hand stiffness lasts all day long. She reports lower back pain, which bothers her the most in the morning. She reports associated stiffness in back which is present ~2 hours. It is difficult to bend down to wear shoes in AM. Back pain does not wake her up at night. Back pain is better with activity. Her back pain has been present for \"a while\" but she feels it has become more normal. She gets pain over the dorsum of the feet, denies heel pain. Gets occasional ankle pain. Denies enthesitis. She reports \"sausage digits\" over her fingers and toes. She takes PRN ibuprofen. She gets prescription pain meds from pain clinic. " "Ibuprofen dulls the pain. She finds tylenol helping with longer relief.     She was diagnosed with psoriasis around 2017. She feels like her skin symptoms are pretty well controlled. Currently, she has mild spots over her neck and L ear. She has some nail \"symptoms\" over her fingernails and toenails. She is only using ketoconazole shampoo and hydrocortisone cream over neck. She sees Dr Spencer for her psoriasis. She reports she had a biopsy of her scalp many years ago.     Denies fevers, chills, weight loss, +night sweats and hot flashes which she attributes to perimenopausal changes, denies vision changes, eye pain/redness, dry eyes, dry mouth, oral/nasal ulcers, hair loss/thinning, rash, raynaud's, cough, SOB, pleurisy, chest pain, edema, heartburn, difficulty swallowing, abdominal pain, diarrhea, hematochezia, melena, dysuria, recurrent genital ulcers. No hx of blood clots.     Pertinent labs and imaging: (per chart review in [facility] and care everywhere)    Labs:   2018: negative ESR, CRP  2013: negative ZAIDA, RF, CCP, SSA, SSB, Sm, Sm/RNP, Scl70, Casandra-1, cardiolipin abs    Rheumatological History:  Previous Rheumatologist(s): Dr Herminia Alcantara, Dr Nuñez, Dr Anand, Dr Tejada, Dr Azevedo, Dr Ventura  Last rheum visit: 12/13/18  Current treatment: none  Past treatments: apremilast (severe HA) -pt states she did not give the medication enough chance as she was traveling around that time, etanercept -helped her skin but did not notice a big difference in her joint symptoms; she stopped it when COVID-19 started she took it for several months, SSZ (does not remember what happened), methotrexate (not as efficacious)    Past Medical History:    Past Medical History:   Diagnosis Date     Calculus of kidney      Dysthymic disorder      Endometriosis, site unspecified      Myalgia and myositis, unspecified      Past Surgical History:   Past Surgical History:   Procedure Laterality Date     HYSTERECTOMY       SURGICAL " "HISTORY OF -   1987    laporoscopy     SURGICAL HISTORY OF -       cryosurgery x 2     Family History:   Denies family hx of RA, SLE, Sjogren's syndrome, scleroderma, PsA, ankylosing spondylitis, psoriasis, vasculitis, gout, pseudogout.    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  Contraception use: none    Allergies   Allergen Reactions     Small Pox Vaccine      Per pt.\"My Mother was told never to allow it to be given again.\" Reaction when pt was a child     Bupropion Other (See Comments)     Cephalosporins Rash     Penicillins Rash      Immunization History   Administered Date(s) Administered     Influenza (IIV3) PF 12/18/2008     TD (ADULT, 7+) 07/27/2004          Medications:  Current Outpatient Medications   Medication Sig Dispense Refill     albuterol (VENTOLIN HFA) 108 (90 Base) MCG/ACT inhaler INHALE ONE OR TWO PUFFS BY MOUTH EVERY FOUR HOURS AS NEEDED FOR WHEEZING 1 Inhaler 1     ALPRAZolam (XANAX) 0.5 MG tablet Take 1 tablet (0.5 mg) by mouth nightly as needed for anxiety 20 tablet 0     beclomethasone HFA (QVAR REDIHALER) 40 MCG/ACT inhaler USE 1 INHALATION BY MOUTH  TWICE DAILY       Biotin 5 MG TBDP Take 1 tablet by mouth       cholecalciferol 50 MCG (2000 UT) tablet Take 1 capsule by mouth       DULoxetine (CYMBALTA) 20 MG EC capsule        estradiol (ESTRACE) 0.5 MG tablet Take 1 tablet (0.5 mg) by mouth daily 90 tablet 1     Fiber, Corn Dextrin, POWD Take 1 tablet by mouth       HYDROcodone-acetaminophen (NORCO) 5-325 MG per tablet TAKE 1-2 TABS BY MOUTH ONCE DAILY AS NEEDED  0     hydrOXYzine (ATARAX) 25 MG tablet TAKE 1 2 TABLETS BY MOUTH EVERY 6 HOURS AS NEEDED       IBUPROFEN 200 200 MG OR TABS 1 TABLET EVERY 4 TO 6 HOURS AS NEEDED       ketoconazole (NIZORAL) 2 % external shampoo LATHER ON SCALP, LEAVE ON FOR 5 MINUTES AND RINSE 120 mL 1     magnesium 200 MG TABS Take 1 tablet by mouth       " montelukast (SINGULAIR) 10 MG tablet Take 1 tablet (10 mg) by mouth At Bedtime 30 tablet 0     multivitamin w/minerals (MULTI-VITAMIN) tablet Take 1 tablet by mouth       OMEGA-3 FATTY ACIDS-VITAMIN E PO Take 1 capsule by mouth       omeprazole (PRILOSEC) 20 MG DR capsule Take 20 mg by mouth       pravastatin (PRAVACHOL) 10 MG tablet Take 1 tablet (10 mg) by mouth daily 90 tablet 1     ZYRTEC 10 MG OR TABS 1 TABLET DAILY         PHYSICAL EXAMINATION: (limited as pt was evaluated via video visit)  Vital signs: (not taken due to evaluation via video visit)    Skin: no facial rashes  Pulm: non-labored respirations, no conversational dyspnea  MSK: ?R 2nd MCP joint swelling with restriction in making a full fist, able to make a fist with L hand, pt reports L wrist tenderness, wrist ROM intact b/l, ROM in neck intact    Labs:      I have reviewed all pertinent investigations including labs, including outside records if relevant    2018: negative ESR, CRP  2013: negative ZAIDA, RF, CCP, SSA, SSB, Sm, Sm/RNP, Scl70, Casandra-1, cardiolipin abs    Imaging:    I have reviewed all pertinent investigations including imaging, including outside records if relevant    12/22/216:   SI Joints XR   hx of Psoriasis, no with inflammatory joint pain, eval for bony changesCOMPARISON: None.FINDINGS: Minimal degenerative change of both SI joints. No signs of sacroiliitis. Degenerative   change lower lumbar spine.    XR b/l hands   Hand appear normal bilaterally, no erosive arthritic changes.There may be some degree of   negative ulnar variance bilaterally which would be better evaluated on a wrist series. Question slight chronic cystic change involving the left capitate with the other visualized carpal bones appearing normal.    XR b/l feet  FINDINGS: Mild osteoarthritis involving each first MTP joint, right worse than left. Feet otherwise   appear normal. No erosive or inflammatory arthritic changes evident.    XR b/l knee  No significant  degenerative changes.. No fracture or dislocation. No joint effusion.    Assessment / Plan:    Sonya Mosqueda is a 50 year old female with pmhx psoriasis, fibromyalgia, PsA, pulmonary nodule w/ non-necrotizing granulomatous inflammation in 2017, CTS s/p surgery, is referred to rheumatology clinic for PsA. Any prior notes, outside records, laboratory results, and imaging studies were reviewed if relevant. Pertinent work-up thus far includes negative ZAIDA, RF, CCP in 2013. Negative ESR, CRP in 2018. Prior plain films from 2016 without erosive disease. She has seen four other rheumatologists prior to me and has tried a few different medications such as methotrexate, SSZ, apremilast and etanercept in the past, stopped due to either lack of efficacy in combination with lack of consistent treatment and follow-up. She feels that her symptoms in her hands and wrists have gotten worse over the last 3 months. History is consistent with inflammatory pain in hands/wrists as well as low back (pain and stiffness worst in AM, improved with activity). Possible R 2nd MCP swelling on video exam today. She reports her psoriasis has been pretty well controlled, follows with Dr Spencer in dermatology.    Discussed clinical features of psoriatic arthritis.  Discussed that psoriatic arthritis is part of the spondyloarthropathy family.  Discussed that psoriatic arthritis is an autoimmune disease which has a predilection towards affecting the joints assymetrically.  Discussed that in psoriatic arthritis, our own immune system starts to attack our own joints. This can lead to joint pain, swelling and joint destruction.  Discussed that w/o treatment, symptoms could worsen and lead to deformities.  This disease has a predilection towards causing inflammation in the lower back along with other peripheral joints.  Other symptoms can include eye problems, tendon problems, and other various autoimmune features. Treatment of psoriatic arthritis is  tailored towards decreasing joint pain, joint inflammation and improving psoriasis. Treatment of psoriatic arthritis include NSAIDS and immunosuppressants. These immunosuppressants may include methotrexate, sulfasalazine, and anti-TNF agents. cDMARDs not effective for axial disease and if present, would need biologic therapy.     Discussed obtaining updated plain films (last plain films in 2016) to evaluate for any interim changes and look for sacroiliitis/signs of axial involvement as history is suggestive of inflammatory back pain. Pt is agreeable.    1) PsA, chronic  -check CBC w diff, CMP, RF, CCP, Hep B and C, TB quant, ESR, CRP  -CXR, plain films of SI joints, b/l hand, b/l foot, L spine  -treatment pending above testing; interestingly, did not have adequate response to arthritis when she was on etanercept  -persons with psoriatic arthritis are at increased risk of cardiovascular disease including heart attack and stroke. We recommend that all patients with these conditions have annual health maintenance exams including lipid measurements, blood pressure measurements, and smoking cessation counseling when applicable at their primary care provider's office.    2) Hx of pulmonary nodule and L hilar VINEET in 2017  -biopsy showed non-necrotizing granulomas, non-specific finding  -repeat imaging showed decrease in size  -no active respiratory symptoms      Ms. Mosqueda verbalized agreement with and understanding of the rationale for the diagnosis and treatment plan.  All questions were answered to best of my ability and the patient's satisfaction. Ms. Mosqueda was advised to contact the clinic with any questions that may arise after the clinic visit.      Chart documentation done in part with Dragon Voice recognition Software. Although reviewed after completion, some word and grammatical error may remain.      RTC 6 weeks, in-person evaluation    Cornelia Garduno MD

## 2021-04-06 ENCOUNTER — VIRTUAL VISIT (OUTPATIENT)
Dept: RHEUMATOLOGY | Facility: CLINIC | Age: 51
End: 2021-04-06
Payer: COMMERCIAL

## 2021-04-06 DIAGNOSIS — L40.50 PSORIATIC ARTHRITIS (H): ICD-10-CM

## 2021-04-06 PROCEDURE — 99204 OFFICE O/P NEW MOD 45 MIN: CPT | Mod: GT | Performed by: STUDENT IN AN ORGANIZED HEALTH CARE EDUCATION/TRAINING PROGRAM

## 2021-04-06 NOTE — PATIENT INSTRUCTIONS
-Please make an appointment for lab and x-rays at any [place] location near you  -I will be in touch once all test results are back  -Follow-up in 6 weeks, in-person evaluation

## 2021-04-06 NOTE — PROGRESS NOTES
Sonya is a 50 year old who is being evaluated via a billable video visit.      How would you like to obtain your AVS? [email or web address]  If the video visit is dropped, the invitation should be resent by: Text to cell phone: [phone]  Will anyone else be joining your video visit? No      Video Start Time: 7:45  Video-Visit Details    Type of service:  Video Visit    Video End Time:8:25    Originating Location (pt. Location): Home    Distant Location (provider location):  [facility]     Platform used for Video Visit: [email or web address]

## 2021-04-16 ENCOUNTER — ANCILLARY PROCEDURE (OUTPATIENT)
Dept: GENERAL RADIOLOGY | Facility: CLINIC | Age: 51
End: 2021-04-16
Attending: STUDENT IN AN ORGANIZED HEALTH CARE EDUCATION/TRAINING PROGRAM
Payer: COMMERCIAL

## 2021-04-16 DIAGNOSIS — L40.50 PSORIATIC ARTHRITIS (H): ICD-10-CM

## 2021-04-16 LAB
ALBUMIN SERPL-MCNC: 3.7 G/DL (ref 3.4–5)
ALP SERPL-CCNC: 64 U/L (ref 40–150)
ALT SERPL W P-5'-P-CCNC: 42 U/L (ref 0–50)
ANION GAP SERPL CALCULATED.3IONS-SCNC: 7 MMOL/L (ref 3–14)
AST SERPL W P-5'-P-CCNC: 23 U/L (ref 0–45)
BASOPHILS # BLD AUTO: 0 10E9/L (ref 0–0.2)
BASOPHILS NFR BLD AUTO: 0.3 %
BILIRUB SERPL-MCNC: 0.4 MG/DL (ref 0.2–1.3)
BUN SERPL-MCNC: 14 MG/DL (ref 7–30)
CALCIUM SERPL-MCNC: 9.3 MG/DL (ref 8.5–10.1)
CHLORIDE SERPL-SCNC: 106 MMOL/L (ref 94–109)
CO2 SERPL-SCNC: 27 MMOL/L (ref 20–32)
CREAT SERPL-MCNC: 0.63 MG/DL (ref 0.52–1.04)
CRP SERPL-MCNC: <2.9 MG/L (ref 0–8)
DIFFERENTIAL METHOD BLD: NORMAL
EOSINOPHIL # BLD AUTO: 0.1 10E9/L (ref 0–0.7)
EOSINOPHIL NFR BLD AUTO: 1.6 %
ERYTHROCYTE [DISTWIDTH] IN BLOOD BY AUTOMATED COUNT: 13.4 % (ref 10–15)
ERYTHROCYTE [SEDIMENTATION RATE] IN BLOOD BY WESTERGREN METHOD: 9 MM/H (ref 0–30)
GFR SERPL CREATININE-BSD FRML MDRD: >90 ML/MIN/{1.73_M2}
GLUCOSE SERPL-MCNC: 91 MG/DL (ref 70–99)
HCT VFR BLD AUTO: 38.7 % (ref 35–47)
HGB BLD-MCNC: 13 G/DL (ref 11.7–15.7)
LYMPHOCYTES # BLD AUTO: 2.6 10E9/L (ref 0.8–5.3)
LYMPHOCYTES NFR BLD AUTO: 43.2 %
MCH RBC QN AUTO: 27.4 PG (ref 26.5–33)
MCHC RBC AUTO-ENTMCNC: 33.6 G/DL (ref 31.5–36.5)
MCV RBC AUTO: 82 FL (ref 78–100)
MONOCYTES # BLD AUTO: 0.5 10E9/L (ref 0–1.3)
MONOCYTES NFR BLD AUTO: 8 %
NEUTROPHILS # BLD AUTO: 2.9 10E9/L (ref 1.6–8.3)
NEUTROPHILS NFR BLD AUTO: 46.9 %
PLATELET # BLD AUTO: 265 10E9/L (ref 150–450)
POTASSIUM SERPL-SCNC: 3.8 MMOL/L (ref 3.4–5.3)
PROT SERPL-MCNC: 7.6 G/DL (ref 6.8–8.8)
RBC # BLD AUTO: 4.75 10E12/L (ref 3.8–5.2)
SODIUM SERPL-SCNC: 140 MMOL/L (ref 133–144)
WBC # BLD AUTO: 6.1 10E9/L (ref 4–11)

## 2021-04-16 PROCEDURE — 86704 HEP B CORE ANTIBODY TOTAL: CPT | Performed by: STUDENT IN AN ORGANIZED HEALTH CARE EDUCATION/TRAINING PROGRAM

## 2021-04-16 PROCEDURE — 85025 COMPLETE CBC W/AUTO DIFF WBC: CPT | Performed by: STUDENT IN AN ORGANIZED HEALTH CARE EDUCATION/TRAINING PROGRAM

## 2021-04-16 PROCEDURE — 80053 COMPREHEN METABOLIC PANEL: CPT | Performed by: STUDENT IN AN ORGANIZED HEALTH CARE EDUCATION/TRAINING PROGRAM

## 2021-04-16 PROCEDURE — 86140 C-REACTIVE PROTEIN: CPT | Performed by: STUDENT IN AN ORGANIZED HEALTH CARE EDUCATION/TRAINING PROGRAM

## 2021-04-16 PROCEDURE — 86706 HEP B SURFACE ANTIBODY: CPT | Performed by: STUDENT IN AN ORGANIZED HEALTH CARE EDUCATION/TRAINING PROGRAM

## 2021-04-16 PROCEDURE — 87340 HEPATITIS B SURFACE AG IA: CPT | Performed by: STUDENT IN AN ORGANIZED HEALTH CARE EDUCATION/TRAINING PROGRAM

## 2021-04-16 PROCEDURE — 73130 X-RAY EXAM OF HAND: CPT | Mod: 50 | Performed by: RADIOLOGY

## 2021-04-16 PROCEDURE — 86200 CCP ANTIBODY: CPT | Performed by: STUDENT IN AN ORGANIZED HEALTH CARE EDUCATION/TRAINING PROGRAM

## 2021-04-16 PROCEDURE — 71046 X-RAY EXAM CHEST 2 VIEWS: CPT | Performed by: RADIOLOGY

## 2021-04-16 PROCEDURE — 85652 RBC SED RATE AUTOMATED: CPT | Performed by: STUDENT IN AN ORGANIZED HEALTH CARE EDUCATION/TRAINING PROGRAM

## 2021-04-16 PROCEDURE — 73630 X-RAY EXAM OF FOOT: CPT | Mod: 50 | Performed by: RADIOLOGY

## 2021-04-16 PROCEDURE — 86803 HEPATITIS C AB TEST: CPT | Performed by: STUDENT IN AN ORGANIZED HEALTH CARE EDUCATION/TRAINING PROGRAM

## 2021-04-16 PROCEDURE — 86481 TB AG RESPONSE T-CELL SUSP: CPT | Performed by: STUDENT IN AN ORGANIZED HEALTH CARE EDUCATION/TRAINING PROGRAM

## 2021-04-16 PROCEDURE — 72202 X-RAY EXAM SI JOINTS 3/> VWS: CPT | Performed by: RADIOLOGY

## 2021-04-16 PROCEDURE — 36415 COLL VENOUS BLD VENIPUNCTURE: CPT | Performed by: STUDENT IN AN ORGANIZED HEALTH CARE EDUCATION/TRAINING PROGRAM

## 2021-04-16 PROCEDURE — 86431 RHEUMATOID FACTOR QUANT: CPT | Performed by: STUDENT IN AN ORGANIZED HEALTH CARE EDUCATION/TRAINING PROGRAM

## 2021-04-16 PROCEDURE — 72100 X-RAY EXAM L-S SPINE 2/3 VWS: CPT | Performed by: RADIOLOGY

## 2021-04-19 LAB
CCP AB SER IA-ACNC: 1 U/ML
GAMMA INTERFERON BACKGROUND BLD IA-ACNC: 0.12 IU/ML
HBV CORE AB SERPL QL IA: NONREACTIVE
HBV SURFACE AB SERPL IA-ACNC: >1000 M[IU]/ML
HBV SURFACE AG SERPL QL IA: NONREACTIVE
HCV AB SERPL QL IA: NONREACTIVE
M TB IFN-G CD4+ BCKGRND COR BLD-ACNC: 7.18 IU/ML
M TB TUBERC IFN-G BLD QL: NEGATIVE
MITOGEN IGNF BCKGRD COR BLD-ACNC: 0 IU/ML
MITOGEN IGNF BCKGRD COR BLD-ACNC: 0.02 IU/ML
RHEUMATOID FACT SER NEPH-ACNC: 8 IU/ML (ref 0–20)

## 2021-04-20 NOTE — RESULT ENCOUNTER NOTE
Dear Sonya,     Degenerative changes seen in the lower back.    Please let us know if you have any questions or concerns.    Regards,  Cornelia Garduno MD

## 2021-04-20 NOTE — RESULT ENCOUNTER NOTE
Dear Sonya,     Chest x-ray showed 1 cm nodule that has been previously seen on prior imaging. I would recommend continued follow-up with primary care provider/pulmonology (lung doctor) regarding the nodule.    Please let us know if you have any questions or concerns.    Regards,  Cornelia Garduno MD

## 2021-04-20 NOTE — RESULT ENCOUNTER NOTE
Dear Sonya,     Negative x-ray of sacroiliac joints.    Please let us know if you have any questions or concerns.    Regards,  Cornelia Garduno MD

## 2021-04-20 NOTE — RESULT ENCOUNTER NOTE
Dear Sonya,     Antibodies for rheumatoid arthritis are negative. Inflammatory markers, cell counts, liver numbers, kidney function are all unremarkable. X-rays showed some degenerative changes (osteoarthritis) in the hands (base of the thumb joints), feet and lower back. No inflammatory changes seen. X-rays and labs are both reassuring and do not indicate active or longstanding findings of inflammation. I will see you on 5/20. We will determine any need for treatment after examination.     Please let us know if you have any questions or concerns.    Regards,  Cornelia Garduno MD

## 2021-04-20 NOTE — RESULT ENCOUNTER NOTE
Dear Sonya,     Here are your foot x-ray results.     Please let us know if you have any questions or concerns.    Regards,  Cornelia Garduno MD

## 2021-05-14 DIAGNOSIS — F41.9 ANXIETY: ICD-10-CM

## 2021-05-14 RX ORDER — ALPRAZOLAM 0.5 MG
0.5 TABLET ORAL
Qty: 20 TABLET | Refills: 0 | Status: SHIPPED | OUTPATIENT
Start: 2021-05-14 | End: 2021-06-29

## 2021-05-14 NOTE — TELEPHONE ENCOUNTER
Routing refill request to provider for review/approval because:  Drug not on the [facility] refill protocol   See note from TEZ Lowery below    Daniel Blount RN

## 2021-05-15 ENCOUNTER — HEALTH MAINTENANCE LETTER (OUTPATIENT)
Age: 51
End: 2021-05-15

## 2021-05-26 ENCOUNTER — RECORDS - HEALTHEAST (OUTPATIENT)
Dept: ADMINISTRATIVE | Facility: CLINIC | Age: 51
End: 2021-05-26

## 2021-05-26 ASSESSMENT — PATIENT HEALTH QUESTIONNAIRE - PHQ9: SUM OF ALL RESPONSES TO PHQ QUESTIONS 1-9: 2

## 2021-05-27 ASSESSMENT — PATIENT HEALTH QUESTIONNAIRE - PHQ9: SUM OF ALL RESPONSES TO PHQ QUESTIONS 1-9: 3

## 2021-05-27 NOTE — TELEPHONE ENCOUNTER
Controlled Substance Refill Request  Medication:   Requested Prescriptions     Pending Prescriptions Disp Refills     ALPRAZolam (XANAX) 0.5 MG tablet [Pharmacy Med Name: ALPRAZOLAM 0.5 MG TABLET] 30 tablet 0     Sig: TAKE 1 TABLET (0.5 MG TOTAL) BY MOUTH 3 (THREE) TIMES A DAY AS NEEDED FOR ANXIETY.     Date Last Fill: 2/7/19  Pharmacy: [facility]   Submit electronically to pharmacy    Controlled Substance Agreement on File:   Encounter-Level CSA Scan Date:    There are no encounter-level csa scan date.       Last office visit with primary: 2/28/2018

## 2021-05-28 ENCOUNTER — RECORDS - HEALTHEAST (OUTPATIENT)
Dept: ADMINISTRATIVE | Facility: CLINIC | Age: 51
End: 2021-05-28

## 2021-05-28 ASSESSMENT — ANXIETY QUESTIONNAIRES: GAD7 TOTAL SCORE: 0

## 2021-05-28 ASSESSMENT — ASTHMA QUESTIONNAIRES: ACT_TOTALSCORE: 25

## 2021-05-28 NOTE — TELEPHONE ENCOUNTER
Call from pt       CC:  Puncture wound - Boby nail into the R thumb     > Earlier today   > Td due   > Was able to stop bleeding     > Cleaned at work    > she also mentions she on Humera (rhumatologist outside of [place])    > She does not think she needs stitches       Wondering if she can jsut come in for a Td booster?      A/P:   > Would need an office visit to eval the wound - determine if ABX may be warrented   > Did get office appt for this afternoon           Devang Arenas, RN   Triage and Medication Refills      Reason for Disposition    Puncture wound from sharp object that was very dirty (e.g., barnyard)    Protocols used: PUNCTURE WOUND-A-[place]

## 2021-05-28 NOTE — PROGRESS NOTES
"FOOT AND ANKLE SURGERY/PODIATRY CONSULT NOTE         ASSESSMENT:   Intractable plantar keratoma left foot      TREATMENT:  Debrided the hyperkeratotic lesion left foot.        HPI: Sonya Mosqueda is a pleasant 48-year-old female who presented to the clinic today complaining of a callus on the bottom of the left foot.  The patient stated she has been treating it is a wart.  She has been applying salicylic acid and it has not responded.  She has noticed this small thick callus for several months.  She has very little discomfort.  She has not had any redness, drainage of bleeding.  She does not recall any particular trauma to the left foot.  Is able to weight-bear without much discomfort.  She has a history of psoriatic arthritis.  She stated that her left foot does give her some pain because of her arthritis.         Past Medical History:   Diagnosis Date     Anxiety disorder      Endometriosis     Created by Conversion  Replacement Utility updated for latest IMO load     Fibromyalgia      GERD (gastroesophageal reflux disease)      HLD (hyperlipidemia)      Nephrolithiasis     Created by Conversion      Spontaneous      Created by Conversion  Replacement Utility updated for latest IMO load       Past Surgical History:   Procedure Laterality Date     HYSTERECTOMY  2013     OOPHORECTOMY Right 2013     NM CRYOCAUTERY OF CERVIX      Description: Cervix Cryosurgery;  Recorded: 2007;     NM DILATION/CURETTAGE,DIAGNOSTIC      Description: Dilation And Curettage;  Recorded: 2007;     NM REVISE MEDIAN N/CARPAL TUNNEL SURG      Description: Neuroplasty Decompression Median Nerve At Carpal Tunnel;  Recorded: 2007;     NM TOTAL ABDOM HYSTERECTOMY      Description: Hysterectomy;  Proc Date: 2013;  Comments: both ovaries gone       Allergies   Allergen Reactions     Bupropion Hcl Other (See Comments)     seizure     Smallpox Vaccine,Live Other (See Comments)     Per pt.\"My Mother was told never to " "allow it to be given again.\" Reaction when pt was a child     Cephalosporins Rash     Penicillins Rash     Small one on side         Current Outpatient Medications:      ALPRAZolam (XANAX) 0.5 MG tablet, TAKE 1 TABLET (0.5 MG TOTAL) BY MOUTH 3 (THREE) TIMES A DAY AS NEEDED FOR ANXIETY., Disp: 30 tablet, Rfl: 0     azelastine (ASTELIN) 137 mcg (0.1 %) nasal spray, USE 1 SPRAY INTO EACH NOSTRIL 2 TIMES A DAY, Disp: , Rfl: 6     beclomethasone (QVAR) 40 mcg/actuation inhaler, Inhale 1 puff 2 (two) times a day., Disp: 1 Inhaler, Rfl: 12     BIOTIN ORAL, Take by mouth. 1 tablet daily, Disp: , Rfl:      CHOLECALCIFEROL, VITAMIN D3, (VITAMIN D3 ORAL), Take 1 capsule by mouth daily., Disp: , Rfl:      diclofenac sodium (VOLTAREN) 1 % Gel, APPLY 2-3 GRAMS 3-4 TIMES PER DAY AS NEEDED, Disp: , Rfl: 0     DULoxetine (CYMBALTA) 20 MG capsule, Take 1 capsule (20 mg total) by mouth 2 (two) times a day., Disp: 180 capsule, Rfl: 3     FIBER, DEXTRIN, ORAL, Take 1 tablet by mouth daily., Disp: , Rfl:      HUMIRA,CF, PEN 40 mg/0.4 mL PnKt, , Disp: , Rfl:      HYDROcodone-acetaminophen 5-325 mg per tablet, Take 1-2 tablets by mouth every 4 (four) hours as needed for pain., Disp: 15 tablet, Rfl: 0     hydrOXYzine HCl (ATARAX) 25 MG tablet, Take 1-2 tablets (25-50 mg total) by mouth every 6 (six) hours as needed (pain)., Disp: 90 tablet, Rfl: 1     ibuprofen (ADVIL,MOTRIN) 200 MG tablet, Take 600 mg by mouth every 6 (six) hours as needed for pain. , Disp: , Rfl:      ketoconazole (NIZORAL) 2 % shampoo, APPLY TO SKIN DAILY AS NEEDED., Disp: , Rfl: 3     magnesium 200 mg Tab, Take 1 tablet by mouth daily., Disp: , Rfl:      montelukast (SINGULAIR) 10 mg tablet, TAKE 1 TABLET BY MOUTH EVERYDAY AT BEDTIME, Disp: 30 tablet, Rfl: 1     multivitamin (ONE A DAY) per tablet, Take 1 tablet by mouth daily., Disp: , Rfl:      omega-3 fatty acids-vitamin E (FISH OIL) 1,000 mg cap, Take 1 capsule by mouth daily., Disp: , Rfl:      omeprazole " (PRILOSEC) 20 MG capsule, TAKE 1 CAPSULE (20 MG TOTAL) BY MOUTH 2 (TWO) TIMES A DAY BEFORE MEALS., Disp: 180 capsule, Rfl: 2     phentermine (ADIPEX-P) 37.5 mg tablet, TAKE 0.5 TABLETS (18.75 MG TOTAL) BY MOUTH 2 (TWO) TIMES A DAY., Disp: 30 tablet, Rfl: 0     pravastatin (PRAVACHOL) 10 MG tablet, Take 1 tablet (10 mg total) by mouth daily., Disp: 90 tablet, Rfl: 3     VENTOLIN HFA 90 mcg/actuation inhaler, INHALE ONE OR TWO PUFFS BY MOUTH EVERY FOUR HOURS AS NEEDED FOR WHEEZING, Disp: 18 Inhaler, Rfl: 2    Family History   Problem Relation Age of Onset     Breast cancer Paternal Grandmother 60     Stroke Mother      Heart attack Mother      Coronary artery disease Mother      Hypertension Mother      Atrial fibrillation Father        Social History     Socioeconomic History     Marital status:      Spouse name: Not on file     Number of children: Not on file     Years of education: Not on file     Highest education level: Not on file   Occupational History     Not on file   Social Needs     Financial resource strain: Not on file     Food insecurity:     Worry: Not on file     Inability: Not on file     Transportation needs:     Medical: Not on file     Non-medical: Not on file   Tobacco Use     Smoking status: Former Smoker     Packs/day: 1.00     Years: 20.00     Pack years: 20.00     Smokeless tobacco: Never Used     Tobacco comment: quit 2006   Substance and Sexual Activity     Alcohol use: No     Drug use: Not on file     Sexual activity: Yes     Partners: Male     Birth control/protection: Surgical   Lifestyle     Physical activity:     Days per week: Not on file     Minutes per session: Not on file     Stress: Not on file   Relationships     Social connections:     Talks on phone: Not on file     Gets together: Not on file     Attends Jain service: Not on file     Active member of club or organization: Not on file     Attends meetings of clubs or organizations: Not on file     Relationship status:  Not on file     Intimate partner violence:     Fear of current or ex partner: Not on file     Emotionally abused: Not on file     Physically abused: Not on file     Forced sexual activity: Not on file   Other Topics Concern     Not on file   Social History Narrative            The 10-year ASCVD risk score (Franko LANDRY Jr, et al., 2013) is: 0.8%      Values used to calculate the score:        Age: 47 years        Sex: Female        Is Non- : No        Diabetic: No        Tobacco smoker: No        Systolic Blood Pressure: 126 mmHg        Is BP treated: No        HDL Cholesterol: 69 mg/dL        Total Cholesterol: 231 mg/dL       Review of Systems - Patient denies fever, chills, rash, wound, stiffness, limping, numbness, weakness, heart burn, blood in stool, chest pain with activity, calf pain when walking, shortness of breath with activity, chronic cough, easy bleeding/bruising, swelling of ankles, excessive thirst, fatigue, depression, anxiety.  Patient admits to callus left foot.      OBJECTIVE:  Appearance: alert, well appearing, and in no distress.    Vitals:    05/13/19 1505   BP: 116/80       BMI= Body mass index is 35.93 kg/m .    General appearance: Patient is alert and fully cooperative with history & exam.  No sign of distress is noted during the visit.  Psychiatric: Affect is pleasant & appropriate.  Patient appears motivated to improve health.  Respiratory: Breathing is regular & unlabored while sitting.  HEENT: Hearing is intact to spoken word.  Speech is clear.  No gross evidence of visual impairment that would impact ambulation.    Vascular: Dorsalis pedis and posterior tibial pulses are palpable. There is pedal hair growth bilaterally.  CFT < 3 sec from anterior tibial surface to distal digits bilaterally. There is no appreciable edema noted.  Dermatologic: Turgor and texture are within normal limits. No coloration or temperature changes.  There is a small hyperkeratotic nucleated  lesion sub-third metatarsal head left foot.    Neurologic: All epicritic and proprioceptive sensations are grossly intact bilaterally.  Musculoskeletal: All active and passive ankle, subtalar, midtarsal, and 1st MPJ range of motion are grossly intact without pain or crepitus, with the exception of none. Manual muscle strength is also bilaterally. All dorsiflexors, plantarflexors, invertors, evertors are intact bilaterally. Tenderness present to plantar aspect left foot on palpation.  No tenderness to left foot or ankle with range of motion. Calf is soft/non-tender without warmth/induration    Imaging:     No results found.       TREATMENT:  Debrided the hyperkeratotic lesion today.  I informed the patient this lesion can recur.  Recommend she return to the clinic as needed.    Eb Tillman; PE[facility]  [facility] Foot & Ankle Surgery/Podiatry

## 2021-05-28 NOTE — PATIENT INSTRUCTIONS - HE
We will give tetanus booster today. It may not be as effective with the Humira, but there is no side effect or contraindication.     I have sent in doxycycline to take if you start to develop symptoms of infection such as spreading redness, warmth, swelling, or pus drainage.

## 2021-05-28 NOTE — PROGRESS NOTES
[facility] Clinic Note    Sonya Mosqueda  1970   [id number]    Sonya Mosqueda is a 48 y.o. female presenting to discuss the following:     CC:   Chief Complaint   Patient presents with     Puncture Wound     Right thumb       HPI:  Sonya is working on house remodel project, was working with lumbar and cut her thumb on a aleksey nail.  She is on Humira and is worried about whether or not she can have tetanus booster and if antibiotics are needed.  Thumb is not painful to touch, does lose a little blood when pushed on.    ROS:   No fevers, no chills, no purulent drainage, no surrounding redness, no warmth.    PMH:   Patient Active Problem List   Diagnosis     Asthma     Thoracic Outlet Syndrome     Nicotine Dependence - In Remission     Generalized Anxiety Disorder     Osteoarthritis Of The Knee     Vitamin D Deficiency     Pure hypercholesterolemia     Major Depression, Recurrent     Fibromyalgia     Obesity     Bulimia nervosa     Acid reflux     Venous insufficiency of both lower extremities     Symptomatic varicose veins of both lower extremities     Psoriatic arthritis (H)     Mediastinal adenopathy     Snoring       Past Medical History:   Diagnosis Date     Anxiety disorder      Endometriosis     Created by Conversion  Replacement Utility updated for latest IMO load     Fibromyalgia      GERD (gastroesophageal reflux disease)      HLD (hyperlipidemia)      Nephrolithiasis     Created by Conversion      Spontaneous      Created by Conversion  Replacement Utility updated for latest IMO load       PSH:   Past Surgical History:   Procedure Laterality Date     HYSTERECTOMY  2013     OOPHORECTOMY Right 2013     [place] CRYOCAUTERY OF CERVIX      Description: Cervix Cryosurgery;  Recorded: 2007;     NV DILATION/CURETTAGE,DIAGNOSTIC      Description: Dilation And Curettage;  Recorded: 2007;     NV REVISE MEDIAN N/CARPAL TUNNEL SURG      Description: Neuroplasty Decompression Median Nerve At Carpal  Tunnel;  Recorded: 12/06/2007;     AL TOTAL ABDOM HYSTERECTOMY      Description: Hysterectomy;  Proc Date: 12/01/2013;  Comments: both ovaries gone         MEDICATIONS:   Current Outpatient Medications on File Prior to Visit   Medication Sig Dispense Refill     ALPRAZolam (XANAX) 0.5 MG tablet TAKE 1 TABLET (0.5 MG TOTAL) BY MOUTH 3 (THREE) TIMES A DAY AS NEEDED FOR ANXIETY. 30 tablet 0     azelastine (ASTELIN) 137 mcg (0.1 %) nasal spray USE 1 SPRAY INTO EACH NOSTRIL 2 TIMES A DAY  6     beclomethasone (QVAR) 40 mcg/actuation inhaler Inhale 1 puff 2 (two) times a day. 1 Inhaler 12     BIOTIN ORAL Take by mouth. 1 tablet daily       CHOLECALCIFEROL, VITAMIN D3, (VITAMIN D3 ORAL) Take 1 capsule by mouth daily.       diclofenac sodium (VOLTAREN) 1 % Gel APPLY 2-3 GRAMS 3-4 TIMES PER DAY AS NEEDED  0     DULoxetine (CYMBALTA) 20 MG capsule Take 1 capsule (20 mg total) by mouth 2 (two) times a day. 180 capsule 3     FIBER, DEXTRIN, ORAL Take 1 tablet by mouth daily.       HUMIRA,CF, PEN 40 mg/0.4 mL PnKt        HYDROcodone-acetaminophen 5-325 mg per tablet Take 1-2 tablets by mouth every 4 (four) hours as needed for pain. 15 tablet 0     hydrOXYzine HCl (ATARAX) 25 MG tablet Take 1-2 tablets (25-50 mg total) by mouth every 6 (six) hours as needed (pain). 90 tablet 1     ibuprofen (ADVIL,MOTRIN) 200 MG tablet Take 600 mg by mouth every 6 (six) hours as needed for pain.        ketoconazole (NIZORAL) 2 % shampoo APPLY TO SKIN DAILY AS NEEDED.  3     magnesium 200 mg Tab Take 1 tablet by mouth daily.       montelukast (SINGULAIR) 10 mg tablet Take 1 tablet (10 mg total) by mouth at bedtime. 30 tablet 1     multivitamin (ONE A DAY) per tablet Take 1 tablet by mouth daily.       omega-3 fatty acids-vitamin E (FISH OIL) 1,000 mg cap Take 1 capsule by mouth daily.       omeprazole (PRILOSEC) 20 MG capsule TAKE 1 CAPSULE (20 MG TOTAL) BY MOUTH 2 (TWO) TIMES A DAY BEFORE MEALS. 180 capsule 2     pravastatin (PRAVACHOL) 10 MG  "tablet Take 1 tablet (10 mg total) by mouth daily. 90 tablet 3     VENTOLIN HFA 90 mcg/actuation inhaler INHALE ONE OR TWO PUFFS BY MOUTH EVERY FOUR HOURS AS NEEDED FOR WHEEZING 18 Inhaler 2     phentermine (ADIPEX-P) 37.5 mg tablet TAKE 0.5 TABLETS (18.75 MG TOTAL) BY MOUTH 2 (TWO) TIMES A DAY. 30 tablet 0     No current facility-administered medications on file prior to visit.        ALLERGIES:  Allergies   Allergen Reactions     Bupropion Hcl Other (See Comments)     seizure     Smallpox Vaccine,Live Other (See Comments)     Per pt.\"My Mother was told never to allow it to be given again.\" Reaction when pt was a child     Cephalosporins Rash     Penicillins Rash     Small one on side     PHYSICAL EXAM:   /80   Pulse 76   Temp 97.9  F (36.6  C) (Oral)   Resp 16   Ht 5' 6.5\" (1.689 m)   Wt (!) 226 lb 9 oz (102.8 kg)   BMI 36.02 kg/m     GENERAL: Sonya is a pleasant, obese female in no acute distress.   DERM: Small, superficial 2 mm laceration right thumb pad.  No surrounding warmth, erythema, induration, or purulent drainage.    ASSESSMENT & PLAN:   Sonya Mosqueda is a 48 y.o. female presenting today for evaluation of puncture wound on thumb by aleksey nail.    1. Puncture wound  - Td, Preservative Free (green label)  - doxycycline (VIBRAMYCIN) 100 MG capsule; Take 1 capsule (100 mg total) by mouth 2 (two) times a day for 5 days.  Dispense: 10 capsule; Refill: 0     Given cut with nail, reviewed last tetanus booster and it has been greater than 5 years.  Recommended tetanus booster.  Discussed that with Humira, tetanus immunization may be less effective, but not a contraindication to receive.  Tetanus booster is inactivated.  Additionally, she is on Humira, recommended prophylactic antibiotics.  She is allergic to penicillins and cephalosporins, so opted for doxycycline for coverage of staph and strep.     RTC: August 2019 - annual physical exam     Deanne Ramirez, DO       "

## 2021-05-28 NOTE — TELEPHONE ENCOUNTER
RN cannot approve Refill Request    RN can NOT refill this medication med is not covered by policy/route to provider. Last office visit: 2/15/2019 Audie Salinas MD Last Physical: Visit date not found Last MTM visit: Visit date not found Last visit same specialty: 4/26/2019 Deanne Ramirez DO.  Next visit within 3 mo: Visit date not found  Next physical within 3 mo: Visit date not found      [facility]    Requested Prescriptions   Pending Prescriptions Disp Refills     montelukast (SINGULAIR) 10 mg tablet [Pharmacy Med Name: MONTELUKAST SOD 10 MG TABLET] 30 tablet 1     Sig: TAKE 1 TABLET BY MOUTH EVERYDAY AT BEDTIME       There is no refill protocol information for this order

## 2021-05-29 ENCOUNTER — RECORDS - HEALTHEAST (OUTPATIENT)
Dept: ADMINISTRATIVE | Facility: CLINIC | Age: 51
End: 2021-05-29

## 2021-05-29 NOTE — TELEPHONE ENCOUNTER
Controlled Substance Refill Request  Medication Name:   Requested Prescriptions     Pending Prescriptions Disp Refills     ALPRAZolam (XANAX) 0.5 MG tablet 30 tablet 0     Sig: Take 1 tablet (0.5 mg total) by mouth 3 (three) times a day as needed for anxiety.     Date Last Fill: 4/4/19  Pharmacy: [facility] [place]      Submit electronically to pharmacy  Controlled Substance Agreement Date Scanned:   Encounter-Level CSA Scan Date:    There are no encounter-level csa scan date.       Last office visit with prescriber/PCP: 2/28/2018 Reina Soler MD OR same dept: 8/24/2018 Krystian Coreas MD OR same specialty: 4/26/2019 Deanne Ramirez DO  Last physical: Visit date not found Last MTM visit: Visit date not found

## 2021-05-29 NOTE — TELEPHONE ENCOUNTER
Former patient of Ryder & has not established care with another provider.  Please assign refill request to covering provider per Clinic standard process.      Refill Approved    Rx renewed per Medication Renewal Policy. Medication was last renewed on 5/25/18.    Chitra Marte, [facility] Triage/Med Refill 6/13/2019     Requested Prescriptions   Pending Prescriptions Disp Refills     pravastatin (PRAVACHOL) 10 MG tablet 90 tablet 3     Sig: Take 1 tablet (10 mg total) by mouth daily.       Statins Refill Protocol (Hmg CoA Reductase Inhibitors) Passed - 6/13/2019  4:17 PM        Passed - PCP or prescribing provider visit in past 12 months      Last office visit with prescriber/PCP: 2/28/2018 Reina Soler MD OR same dept: 8/24/2018 Krystian Coreas MD OR same specialty: 4/26/2019 Deanne Ramirez DO  Last physical: Visit date not found Last MTM visit: Visit date not found   Next visit within 3 mo: Visit date not found  Next physical within 3 mo: Visit date not found  Prescriber OR PCP: Reina Soler MD  Last diagnosis associated with med order: 1. Hyperlipemia  - pravastatin (PRAVACHOL) 10 MG tablet; Take 1 tablet (10 mg total) by mouth daily.  Dispense: 90 tablet; Refill: 3    If protocol passes may refill for 12 months if within 3 months of last provider visit (or a total of 15 months).

## 2021-05-29 NOTE — TELEPHONE ENCOUNTER
Please inform patient that her medication is being refilled.  She needs to schedule appointment in establish care with a primary at this clinic as Dr. Soler is not here anymore.  She will need urine drug screening and a controlled substance agreement protocol per the new rules.    Krystian Coreas MD  6/21/2019

## 2021-05-29 NOTE — TELEPHONE ENCOUNTER
RN cannot approve Refill Request    RN can NOT refill this medication No established PCP. Last office visit: 2/28/2018 Reina Soler MD Last Physical: Visit date not found Last MTM visit: Visit date not found Last visit same specialty: 4/26/2019 Deanne Ramirez DO.  Next visit within 3 mo: Visit date not found  Next physical within 3 mo: Visit date not found      Germaine Bender, [facility] Triage/Med Refill 6/22/2019    Requested Prescriptions   Pending Prescriptions Disp Refills     montelukast (SINGULAIR) 10 mg tablet 30 tablet 1       There is no refill protocol information for this order

## 2021-05-30 ENCOUNTER — RECORDS - HEALTHEAST (OUTPATIENT)
Dept: ADMINISTRATIVE | Facility: CLINIC | Age: 51
End: 2021-05-30

## 2021-05-30 VITALS — BODY MASS INDEX: 31.71 KG/M2 | HEIGHT: 67 IN | WEIGHT: 202 LBS

## 2021-05-30 VITALS — BODY MASS INDEX: 31.64 KG/M2 | WEIGHT: 202 LBS

## 2021-05-30 VITALS
WEIGHT: 211.8 LBS | HEIGHT: 67 IN | WEIGHT: 211.8 LBS | HEIGHT: 67 IN | BODY MASS INDEX: 33.24 KG/M2 | BODY MASS INDEX: 33.24 KG/M2

## 2021-05-30 VITALS — WEIGHT: 222.31 LBS | BODY MASS INDEX: 35.61 KG/M2

## 2021-05-30 VITALS — WEIGHT: 215.31 LBS | HEIGHT: 67 IN | BODY MASS INDEX: 33.79 KG/M2

## 2021-05-30 VITALS — BODY MASS INDEX: 33.35 KG/M2 | HEIGHT: 67 IN | WEIGHT: 212.5 LBS

## 2021-05-30 VITALS — BODY MASS INDEX: 31.32 KG/M2 | WEIGHT: 200 LBS

## 2021-05-30 VITALS — BODY MASS INDEX: 32.18 KG/M2 | WEIGHT: 205 LBS | HEIGHT: 67 IN

## 2021-05-30 NOTE — TELEPHONE ENCOUNTER
Refill Approved    Rx renewed per Medication Renewal Policy. Medication was last renewed on 6/27/2019.  Last OV 7/3/2019.    Abena Almaraz, [facility] Connection Triage/Med Refill 7/26/2019     Requested Prescriptions   Pending Prescriptions Disp Refills     hydrOXYzine HCl (ATARAX) 25 MG tablet [Pharmacy Med Name: HYDROXYZINE HCL 25 MG TABLET] 30 tablet 1     Sig: TAKE 1-2 TABLETS (25-50 MG TOTAL) BY MOUTH EVERY 6 (SIX) HOURS AS NEEDED (PAIN).       Antihistamine Refill Protocol Passed - 7/26/2019  7:54 AM        Passed - Patient has had office visit/physical in last year     Last office visit with prescriber/PCP: 4/26/2019 Deanne Ramirez DO OR same dept: Visit date not found OR same specialty: Visit date not found  Last physical: Visit date not found Last MTM visit: Visit date not found   Next visit within 3 mo: Visit date not found  Next physical within 3 mo: Visit date not found  Prescriber OR PCP: Deanne Ramirez DO  Last diagnosis associated with med order: 1. Pain  - hydrOXYzine HCl (ATARAX) 25 MG tablet [Pharmacy Med Name: HYDROXYZINE HCL 25 MG TABLET]; Take 1-2 tablets (25-50 mg total) by mouth every 6 (six) hours as needed (pain).  Dispense: 30 tablet; Refill: 1    If protocol passes may refill for 12 months if within 3 months of last provider visit (or a total of 15 months).

## 2021-05-30 NOTE — TELEPHONE ENCOUNTER
Former patient of Ryder & has not established care with another provider.  Please assign refill request to covering provider per Clinic standard process.      Refill Approved    Rx renewed per Medication Renewal Policy. Medication was last renewed on 3/18/19.    Chitra Marte, [facility] Triage/Med Refill 6/27/2019     Requested Prescriptions   Pending Prescriptions Disp Refills     hydrOXYzine HCl (ATARAX) 25 MG tablet 90 tablet 1     Sig: Take 1-2 tablets (25-50 mg total) by mouth every 6 (six) hours as needed (pain).       Antihistamine Refill Protocol Passed - 6/27/2019 10:40 AM        Passed - Patient has had office visit/physical in last year     Last office visit with prescriber/PCP: Visit date not found OR same dept: Visit date not found OR same specialty: Visit date not found  Last physical: Visit date not found Last MTM visit: Visit date not found   Next visit within 3 mo: Visit date not found  Next physical within 3 mo: Visit date not found  Prescriber OR PCP: No Primary Care Provider  Last diagnosis associated with med order: There are no diagnoses linked to this encounter.  If protocol passes may refill for 12 months if within 3 months of last provider visit (or a total of 15 months).

## 2021-05-30 NOTE — TELEPHONE ENCOUNTER
Dr. Mejía/ok to wait-  See pt's [employer] message and reply via [employer].  Pt is aware you are out of the office until Monday.

## 2021-05-30 NOTE — TELEPHONE ENCOUNTER
RN cannot approve Refill Request    RN can NOT refill this medication med is not covered by policy/route to provider. Last office visit: 7/3/2019 Lamar Mejía MD Last Physical: Visit date not found Last MTM visit: Visit date not found Last visit same specialty: 7/3/2019 Lamar Mejía MD.  Next visit within 3 mo: Visit date not found  Next physical within 3 mo: Visit date not found      Hailey Dc [facility] Triage/Med Refill 7/19/2019    Requested Prescriptions   Pending Prescriptions Disp Refills     montelukast (SINGULAIR) 10 mg tablet [Pharmacy Med Name: MONTELUKAST SOD 10 MG TABLET] 30 tablet 0     Sig: TAKE 1 TABLET BY MOUTH EVERYDAY AT BEDTIME. DUE FOR AN OFFICE VISIT PRIOR TO FURTHER REFILLS       There is no refill protocol information for this order         Patient reports she is going out of town tomorrow and requests this be filled today- she will run out when she is out of town.     Hailey Dc RN [facility] Triage/Med Refill 7/19/2019 10:19 AM

## 2021-05-30 NOTE — PROGRESS NOTES
Assessment/Plan:    Sonya Mosqueda is a 48 y.o. female presenting for:    1. Visit for screening mammogram  - Mammo Screening Bilateral; Future    2. Routine general medical examination at a health care facility  - Lipid Jackson FASTING; Future  - Basic Metabolic Panel; Future  - Glycosylated Hemoglobin A1c; Future    3. Mild persistent asthma without complication  Overall doing very well with the Singulair and Qvar.  She might wean off the Qvar and see how she does.  She does have albuterol at home.  She does not need refills today.    4. Pure hypercholesterolemia  Doing well with her statin medication.  She will follow-up fasting for repeat cholesterol levels.    5. Fibromyalgia  Continue to follow with pain clinic -uses approximately 30 tablets of Vicodin per month.  We did discuss that we could probably do that here as well but she will continue with her pain clinic.    6. Generalized anxiety disorder  Under good control.  Occasional Xanax.    7. Mild episode of recurrent major depressive disorder (H)      8. Class 1 obesity due to excess calories with serious comorbidity and body mass index (BMI) of 34.0 to 34.9 in adult  Recent weight loss of about 13 pounds.  Congratulations was given.  She was seeing the weight loss center and receiving phentermine but she has not been using it lately.  She will be overly negative    9. Psoriatic arthritis (H)  Continue to follow with rheumatology for Humira.        There are no discontinued medications.    I spent a total of 35 minutes with this patient over half of which spent in face-to-face counseling and coordination of care    Chief Complaint:  Chief Complaint   Patient presents with     Establish Care     Medication Education Visit       Subjective:   Sonya Mosqueda is an extremely pleasant 48-year-old female with a past medical history significant for psoriatic arthritis, anxiety, fibromyalgia and asthma presenting to the clinic today for an establishment of care.  The  patient was seeing a provider at the [facility] who is no longer practicing there.    She feels as though she is overall very stable and doing well.  For her anxiety and depression she is on Cymbalta as well as Xanax and she feels as though this is helping.  She sees a rheumatologist for her psoriatic arthritis and is using Humira and has not had any flares for quite some time.    She really has no specific questions or concerns today.  She is due for some blood work but would like to come back fasting for this.  She sees a pain specialist and receives about 30 oxycodone per month for her pain flares.    She has been attempting to lose weight which has been successful.  She has lost approximately 13 pounds in the last month and a half.  She was seeing the weight loss clinic in [place] and using phentermine but she is not using this anymore.  She feels as though she is doing well on her own at this point.    12 point review of systems completed and negative except for what has been described above    Social History     Tobacco Use   Smoking Status Former Smoker     Packs/day: 1.00     Years: 20.00     Pack years: 20.00   Smokeless Tobacco Never Used   Tobacco Comment    quit 2006       Current Outpatient Medications   Medication Sig     ALPRAZolam (XANAX) 0.5 MG tablet Take 1 tablet (0.5 mg total) by mouth 3 (three) times a day as needed for anxiety.     azelastine (ASTELIN) 137 mcg (0.1 %) nasal spray USE 1 SPRAY INTO EACH NOSTRIL 2 TIMES A DAY     beclomethasone (QVAR) 40 mcg/actuation inhaler Inhale 1 puff 2 (two) times a day.     BIOTIN ORAL Take by mouth. 1 tablet daily     CHOLECALCIFEROL, VITAMIN D3, (VITAMIN D3 ORAL) Take 1 capsule by mouth daily.     diclofenac sodium (VOLTAREN) 1 % Gel APPLY 2-3 GRAMS 3-4 TIMES PER DAY AS NEEDED     DULoxetine (CYMBALTA) 20 MG capsule Take 1 capsule (20 mg total) by mouth 2 (two) times a day.     FIBER, DEXTRIN, ORAL Take 1 tablet by mouth daily.      "HUMIRA,CF, PEN 40 mg/0.4 mL PnKt      HYDROcodone-acetaminophen 5-325 mg per tablet Take 1-2 tablets by mouth every 4 (four) hours as needed for pain.     hydrOXYzine HCl (ATARAX) 25 MG tablet Take 1-2 tablets (25-50 mg total) by mouth every 6 (six) hours as needed (pain).     ibuprofen (ADVIL,MOTRIN) 200 MG tablet Take 600 mg by mouth every 6 (six) hours as needed for pain.      ketoconazole (NIZORAL) 2 % shampoo APPLY TO SKIN DAILY AS NEEDED.     magnesium 200 mg Tab Take 1 tablet by mouth daily.     montelukast (SINGULAIR) 10 mg tablet TAKE 1 TABLET BY MOUTH EVERYDAY AT BEDTIME.  Due for an office visit prior to further refills (noted 06/24/19)     multivitamin (ONE A DAY) per tablet Take 1 tablet by mouth daily.     omega-3 fatty acids-vitamin E (FISH OIL) 1,000 mg cap Take 1 capsule by mouth daily.     omeprazole (PRILOSEC) 20 MG capsule TAKE 1 CAPSULE (20 MG TOTAL) BY MOUTH 2 (TWO) TIMES A DAY BEFORE MEALS.     phentermine (ADIPEX-P) 37.5 mg tablet TAKE 0.5 TABLETS (18.75 MG TOTAL) BY MOUTH 2 (TWO) TIMES A DAY.     pravastatin (PRAVACHOL) 10 MG tablet Take 1 tablet (10 mg total) by mouth daily.     VENTOLIN HFA 90 mcg/actuation inhaler INHALE ONE OR TWO PUFFS BY MOUTH EVERY FOUR HOURS AS NEEDED FOR WHEEZING         Objective:  Vitals:    07/03/19 1554   BP: 112/86   Pulse: 80   Resp: 16   Temp: 98  F (36.7  C)   TempSrc: Oral   Weight: 213 lb 3 oz (96.7 kg)   Height: 5' 6.5\" (1.689 m)       Body mass index is 33.89 kg/m .    Vital signs reviewed and stable  General: No acute distress  Psych: Appropriate affect  HEENT: moist mucous membranes, pupils equal, round, reactive to light and accomodation, posterior oropharynx clear of erythema or exudate, tympanic membranes are pearly grey bilaterally  Lymph: no cervical or supraclavicular lymphadenopathy  Cardiovascular: regular rate and rhythm with no murmur  Pulmonary: clear to auscultation bilaterally with no wheeze  Abdomen: soft, non tender, non distended with " normo-active bowel sounds  Extremities: warm and well perfused with no edema  Skin: warm and dry with no rash         This note has been dictated and transcribed using voice recognition software.   Any errors in transcription are unintentional and inherent to the software.

## 2021-05-30 NOTE — TELEPHONE ENCOUNTER
Patient has an Establish Care visit on 07.03.19 at 4PM with Dr Mejía at [facility].-ZHANE

## 2021-05-31 VITALS — BODY MASS INDEX: 29.29 KG/M2 | WEIGHT: 184.2 LBS

## 2021-05-31 VITALS — WEIGHT: 233 LBS | BODY MASS INDEX: 37.32 KG/M2

## 2021-05-31 VITALS — BODY MASS INDEX: 29.41 KG/M2 | WEIGHT: 185 LBS

## 2021-05-31 VITALS — BODY MASS INDEX: 37.51 KG/M2 | HEIGHT: 67 IN | WEIGHT: 239 LBS

## 2021-05-31 VITALS — WEIGHT: 192 LBS | BODY MASS INDEX: 30.53 KG/M2

## 2021-05-31 VITALS — BODY MASS INDEX: 28.17 KG/M2 | WEIGHT: 177.2 LBS

## 2021-05-31 VITALS — BODY MASS INDEX: 37.67 KG/M2 | WEIGHT: 240 LBS | HEIGHT: 67 IN

## 2021-05-31 VITALS — BODY MASS INDEX: 32.43 KG/M2 | WEIGHT: 204 LBS

## 2021-05-31 VITALS — WEIGHT: 218 LBS | BODY MASS INDEX: 34.66 KG/M2

## 2021-05-31 VITALS — BODY MASS INDEX: 29.14 KG/M2 | WEIGHT: 183.31 LBS

## 2021-05-31 VITALS — WEIGHT: 183.06 LBS | BODY MASS INDEX: 29.1 KG/M2

## 2021-06-01 ENCOUNTER — RECORDS - HEALTHEAST (OUTPATIENT)
Dept: ADMINISTRATIVE | Facility: CLINIC | Age: 51
End: 2021-06-01

## 2021-06-01 VITALS — BODY MASS INDEX: 30.21 KG/M2 | WEIGHT: 190 LBS

## 2021-06-01 VITALS — WEIGHT: 199 LBS | BODY MASS INDEX: 31.64 KG/M2

## 2021-06-01 VITALS — BODY MASS INDEX: 33.8 KG/M2 | WEIGHT: 212.6 LBS

## 2021-06-01 NOTE — PROGRESS NOTES
[facility] Heart Care Note    Assessment:    Sonya Mosqueda is a 48 y.o. old female with a PMHx of asthma, thoracic outlet syndrome, generalized anxiety disorder, OA, HLP, fibromyalgia, depression, and obesity who presents to Heart Care clinic with concerns for chest pain. Mrs. Mosqueda initially presented to the ED where ECG was significant for NSR (and some concern for T wave abnormalities), and Troponin was negative x2. Patient was asymptomatic upon arrival. Today, Mrs. Mosqueda reports that her symptoms have not recurred since her ED visit, but she has had multiple episodes similar to those presenting symptoms prior to her ED encounter. She denies any similarities to her GERD, and states that the only associated symptom was flushing. She reports a hx maternal heart disease/CHF.       Plan:  1.) Stress echo given presentation and risk factors      ______________________________________________________________________      Review of Systems:   As noted in HPI, all others reviewed and are negative      Problem List:  Patient Active Problem List   Diagnosis     Asthma     Thoracic Outlet Syndrome     Nicotine Dependence - In Remission     Generalized Anxiety Disorder     Osteoarthritis Of The Knee     Vitamin D Deficiency     Pure hypercholesterolemia     Major Depression, Recurrent     Fibromyalgia     Obesity     Bulimia nervosa     Acid reflux     Venous insufficiency of both lower extremities     Symptomatic varicose veins of both lower extremities     Psoriatic arthritis (H)     Mediastinal adenopathy     Snoring     Medical History:  Past Medical History:   Diagnosis Date     Anxiety disorder      Endometriosis     Created by Conversion  Replacement Utility updated for latest IMO load     Fibromyalgia      GERD (gastroesophageal reflux disease)      HLD (hyperlipidemia)      Nephrolithiasis     Created by Conversion      Spontaneous      Created by Conversion  Replacement Utility updated for latest IMO load      Surgical History:  Past Surgical History:   Procedure Laterality Date     HYSTERECTOMY  2013     OOPHORECTOMY Right 2013     ND CRYOCAUTERY OF CERVIX      Description: Cervix Cryosurgery;  Recorded: 12/06/2007;     ND DILATION/CURETTAGE,DIAGNOSTIC      Description: Dilation And Curettage;  Recorded: 12/06/2007;     ND REVISE MEDIAN N/CARPAL TUNNEL SURG      Description: Neuroplasty Decompression Median Nerve At Carpal Tunnel;  Recorded: 12/06/2007;     ND TOTAL ABDOM HYSTERECTOMY      Description: Hysterectomy;  Proc Date: 12/01/2013;  Comments: both ovaries gone     Social History:  Social History     Socioeconomic History     Marital status:      Spouse name: Not on file     Number of children: Not on file     Years of education: Not on file     Highest education level: Not on file   Occupational History     Not on file   Social Needs     Financial resource strain: Not on file     Food insecurity:     Worry: Not on file     Inability: Not on file     Transportation needs:     Medical: Not on file     Non-medical: Not on file   Tobacco Use     Smoking status: Former Smoker     Packs/day: 1.00     Years: 20.00     Pack years: 20.00     Smokeless tobacco: Never Used     Tobacco comment: quit 2006   Substance and Sexual Activity     Alcohol use: No     Drug use: Not on file     Sexual activity: Yes     Partners: Male     Birth control/protection: Surgical   Lifestyle     Physical activity:     Days per week: Not on file     Minutes per session: Not on file     Stress: Not on file   Relationships     Social connections:     Talks on phone: Not on file     Gets together: Not on file     Attends Nondenominational service: Not on file     Active member of club or organization: Not on file     Attends meetings of clubs or organizations: Not on file     Relationship status: Not on file     Intimate partner violence:     Fear of current or ex partner: Not on file     Emotionally abused: Not on file     Physically abused:  "Not on file     Forced sexual activity: Not on file   Other Topics Concern     Not on file   Social History Narrative            The 10-year ASCVD risk score (Franko LANDRY Jr, et al., 2013) is: 0.8%      Values used to calculate the score:        Age: 47 years        Sex: Female        Is Non- : No        Diabetic: No        Tobacco smoker: No        Systolic Blood Pressure: 126 mmHg        Is BP treated: No        HDL Cholesterol: 69 mg/dL        Total Cholesterol: 231 mg/dL           Family History:  Family History   Problem Relation Age of Onset     Breast cancer Paternal Grandmother 60     Stroke Mother      Heart attack Mother      Coronary artery disease Mother      Hypertension Mother      Atrial fibrillation Father          Allergies:  Allergies   Allergen Reactions     Bupropion Hcl Other (See Comments)     seizure     Smallpox Vaccine,Live Other (See Comments)     Per pt.\"My Mother was told never to allow it to be given again.\" Reaction when pt was a child     Cephalosporins Rash     Penicillins Rash     Small one on side       Medications:  Current Outpatient Medications   Medication Sig Dispense Refill     ALPRAZolam (XANAX) 0.5 MG tablet Take 1 tablet (0.5 mg total) by mouth 3 (three) times a day as needed for anxiety. 30 tablet 0     azelastine (ASTELIN) 137 mcg (0.1 %) nasal spray USE 1 SPRAY INTO EACH NOSTRIL 2 TIMES A DAY  6     beclomethasone (QVAR) 40 mcg/actuation inhaler Inhale 1 puff 2 (two) times a day. 1 Inhaler 12     BIOTIN ORAL Take by mouth. 1 tablet daily       CHOLECALCIFEROL, VITAMIN D3, (VITAMIN D3 ORAL) Take 1 capsule by mouth daily.       diclofenac sodium (VOLTAREN) 1 % Gel APPLY 2-3 GRAMS 3-4 TIMES PER DAY AS NEEDED  0     DULoxetine (CYMBALTA) 20 MG capsule Take 1 capsule (20 mg total) by mouth 2 (two) times a day. 180 capsule 3     FIBER, DEXTRIN, ORAL Take 1 tablet by mouth daily.       SHADCF, PEN 40 mg/0.4 mL PnKt        HYDROcodone-acetaminophen 5-325 " "mg per tablet Take 1-2 tablets by mouth every 4 (four) hours as needed for pain. 15 tablet 0     hydrOXYzine HCl (ATARAX) 25 MG tablet TAKE 1-2 TABLETS (25-50 MG TOTAL) BY MOUTH EVERY 6 (SIX) HOURS AS NEEDED (PAIN). 360 tablet 3     ibuprofen (ADVIL,MOTRIN) 200 MG tablet Take 600 mg by mouth every 6 (six) hours as needed for pain.        ketoconazole (NIZORAL) 2 % shampoo APPLY TO SKIN DAILY AS NEEDED.  3     magnesium 200 mg Tab Take 1 tablet by mouth daily.       montelukast (SINGULAIR) 10 mg tablet TAKE 1 TABLET BY MOUTH EVERYDAY AT BEDTIME. 90 tablet 2     multivitamin (ONE A DAY) per tablet Take 1 tablet by mouth daily.       omega-3 fatty acids-vitamin E (FISH OIL) 1,000 mg cap Take 1 capsule by mouth daily.       omeprazole (PRILOSEC) 20 MG capsule TAKE 1 CAPSULE (20 MG TOTAL) BY MOUTH 2 (TWO) TIMES A DAY BEFORE MEALS. 180 capsule 2     phentermine (ADIPEX-P) 37.5 mg tablet Take 0.5 tablets (18.75 mg total) by mouth 2 (two) times a day. 30 tablet 2     pravastatin (PRAVACHOL) 10 MG tablet Take 1 tablet (10 mg total) by mouth daily. 90 tablet 0     VENTOLIN HFA 90 mcg/actuation inhaler INHALE ONE OR TWO PUFFS BY MOUTH EVERY FOUR HOURS AS NEEDED FOR WHEEZING 18 Inhaler 2     No current facility-administered medications for this visit.        Objective:   Vital signs:  BP (!) 118/92 (Patient Site: Left Arm, Patient Position: Sitting, Cuff Size: Adult Large)   Pulse 100   Resp 12   Ht 5' 6.5\" (1.689 m)   Wt 209 lb 12.8 oz (95.2 kg)   BMI 33.36 kg/m        Physical Exam:    GENERAL APPEARANCE: Alert, cooperative and in no acute distress.   HEENT: No scleral icterus. Oral mucuos membranes pink and moist.   NECK: no JVD. No Hepatojugular reflux. Thyroid not visualized. No lymphadenopathy   CHEST: clear to auscultation   CARDIOVASCULAR: S1, S2 without murmur ,clicks or rubs. Brachial, radial and posterior tibial pulses are intact and symetric. No carotid bruits noted. No edema  ABDOMEN: Nontender. BS+. No " bruits.   SKIN: No Xanthelasma   Musculoskeletal: No cyanosis, clubbing or swelling.      Lab Results:  LIPIDS:  Lab Results   Component Value Date    CHOL 231 (H) 06/26/2017    CHOL 191 09/02/2015    CHOL 231 (H) 08/29/2014     Lab Results   Component Value Date    HDL 69 06/26/2017    HDL 64 09/02/2015    HDL 61 08/29/2014     Lab Results   Component Value Date    LDLCALC 133 (H) 06/26/2017    LDLCALC 112 09/02/2015    LDLCALC 144 (H) 08/29/2014     Lab Results   Component Value Date    TRIG 147 06/26/2017    TRIG 73 09/02/2015    TRIG 131 08/29/2014     No components found for: CHOLHDL    BMP:  Lab Results   Component Value Date    CREATININE 0.70 09/24/2019    BUN 9 09/24/2019     09/24/2019    K 3.4 (L) 09/24/2019     09/24/2019    CO2 27 09/24/2019         Rachelle Roa MD., MHS.  [facility]

## 2021-06-01 NOTE — TELEPHONE ENCOUNTER
RN triage   Call from pt   Pt states on Tues she had pain in her chest and went to ED -- heart OK --   [facility]   Today = passed soft formed stool - w/ bright red blood -- toilet water red ---   No abd pain   No dizziness  No nausea or vomiting   Reviewed home care advice   Per protocol = should be seen   Transferred to    Reviewed when to be seen sooner   Lashell Boogie RN [facility] RN triage      Reason for Disposition    MODERATE rectal bleeding (small blood clots, passing blood without stool, or toilet water turns red)    Protocols used: RECTAL BLEEDING-A-AH

## 2021-06-02 ENCOUNTER — RECORDS - HEALTHEAST (OUTPATIENT)
Dept: ADMINISTRATIVE | Facility: CLINIC | Age: 51
End: 2021-06-02

## 2021-06-02 VITALS — WEIGHT: 219 LBS | BODY MASS INDEX: 34.82 KG/M2

## 2021-06-02 VITALS — BODY MASS INDEX: 34.82 KG/M2 | WEIGHT: 219 LBS

## 2021-06-02 VITALS — BODY MASS INDEX: 35.47 KG/M2 | WEIGHT: 226 LBS | HEIGHT: 67 IN

## 2021-06-02 VITALS — BODY MASS INDEX: 35.56 KG/M2 | HEIGHT: 67 IN | WEIGHT: 226.56 LBS

## 2021-06-02 VITALS — WEIGHT: 227 LBS | BODY MASS INDEX: 35.63 KG/M2 | HEIGHT: 67 IN

## 2021-06-02 NOTE — TELEPHONE ENCOUNTER
Refill Approved    Rx renewed per Medication Renewal Policy. Medication was last renewed on 6/14/19.    Chitra Marte, [facility] Triage/Med Refill 10/4/2019     Requested Prescriptions   Pending Prescriptions Disp Refills     pravastatin (PRAVACHOL) 10 MG tablet 90 tablet 0     Sig: Take 1 tablet (10 mg total) by mouth daily.       Statins Refill Protocol (Hmg CoA Reductase Inhibitors) Passed - 10/4/2019  9:54 AM        Passed - PCP or prescribing provider visit in past 12 months      Last office visit with prescriber/PCP: 8/24/2018 Krystian Coreas MD OR same dept: Visit date not found OR same specialty: 7/3/2019 Lamar Mejía MD  Last physical: Visit date not found Last MTM visit: Visit date not found   Next visit within 3 mo: Visit date not found  Next physical within 3 mo: Visit date not found  Prescriber OR PCP: Krystian Coreas MD  Last diagnosis associated with med order: 1. Hyperlipemia  - pravastatin (PRAVACHOL) 10 MG tablet; Take 1 tablet (10 mg total) by mouth daily.  Dispense: 90 tablet; Refill: 0    If protocol passes may refill for 12 months if within 3 months of last provider visit (or a total of 15 months).

## 2021-06-02 NOTE — TELEPHONE ENCOUNTER
Refill Request  Did you contact pharmacy: Request received from patient's pharmacy via fax.   Medication name:   Requested Prescriptions     Pending Prescriptions Disp Refills     pravastatin (PRAVACHOL) 10 MG tablet 90 tablet 0     Sig: Take 1 tablet (10 mg total) by mouth daily.     Who prescribed the medication: Dr. Krystian Coreas  Pharmacy Name and Location: [facility] ([place])  Is patient out of medication: Information not provided.   Patient notified refills processed in 72 hours:  no  Okay to leave a detailed message: no

## 2021-06-02 NOTE — PROGRESS NOTES
Assessment/Plan:    Sonya Mosqueda is a 48 y.o. female presenting for:    1. Atypical chest pain  We went through her emergency department course as well as the cardiologist note.  We discussed the results of her lab work as well as of her stress echocardiogram which was done at the beginning of this month.  All results are normal.  I believe her chest pain is likely from reflux.  She will start slowly wean her omeprazole from 2 tablets daily to 1 tablet daily in hopes to wean off completely.    2. Mild intermittent asthma without complication  Refill of Qvar sent to the pharmacy.  She uses this seasonally.  - beclomethasone (QVAR) 40 mcg/actuation inhaler; Inhale 1 puff 2 (two) times a day.  Dispense: 1 Inhaler; Refill: 12    3. Allergic rhinitis  She does not need a refill of her Singulair at this point but will let me know.    I personally spent 25 minutes with this patient over half of which spent in face-to-face counseling and coordination of care discussing her chest pain, ER visit and cardiology follow-up.    Medications Discontinued During This Encounter   Medication Reason     beclomethasone (QVAR) 40 mcg/actuation inhaler Reorder           Chief Complaint:  Chief Complaint   Patient presents with     Follow-up     ER visit chest pain       Subjective:   Sonya Mosqueda is a very pleasant 48-year-old female presenting to the clinic today for an emergency room follow-up.  Patient was seen at [facility] emergency department on 24 September for chest pain.  The patient was at home and had a very tight squeezing chest pain in her central chest.  This was a third time it happened that month and thus she went to the emergency department for evaluation.  Initial EKG had the leads placed wrong but repeat EKG showed some nonspecific ST wave changes.  Troponins were negative x2.  Labs otherwise were unremarkable.    She followed up with cardiology the next day in rapid access clinic.  A stress echocardiogram was  recommended which she did but did not get the results of.    She would like to go over those results today.  She notes that prior to her chest pain she had decreased her omeprazole from 2 tablets daily to 1 tablet daily and she thinks that this might of been the culprit.  She is hopeful to get off of the omeprazole.  She is starting to change her diet and is hopeful to get more exercise as well.    12 point review of systems completed and negative except for what has been described above    Social History     Tobacco Use   Smoking Status Former Smoker     Packs/day: 1.00     Years: 20.00     Pack years: 20.00   Smokeless Tobacco Never Used   Tobacco Comment    quit 2006       Current Outpatient Medications   Medication Sig     ALPRAZolam (XANAX) 0.5 MG tablet Take 1 tablet (0.5 mg total) by mouth 3 (three) times a day as needed for anxiety.     azelastine (ASTELIN) 137 mcg (0.1 %) nasal spray USE 1 SPRAY INTO EACH NOSTRIL 2 TIMES A DAY     beclomethasone (QVAR) 40 mcg/actuation inhaler Inhale 1 puff 2 (two) times a day.     BIOTIN ORAL Take by mouth. 1 tablet daily     CHOLECALCIFEROL, VITAMIN D3, (VITAMIN D3 ORAL) Take 1 capsule by mouth daily.     diclofenac sodium (VOLTAREN) 1 % Gel APPLY 2-3 GRAMS 3-4 TIMES PER DAY AS NEEDED     DULoxetine (CYMBALTA) 20 MG capsule Take 1 capsule (20 mg total) by mouth 2 (two) times a day.     FIBER, DEXTRIN, ORAL Take 1 tablet by mouth daily.     HUMIRA,CF, PEN 40 mg/0.4 mL PnKt      HYDROcodone-acetaminophen 5-325 mg per tablet Take 1-2 tablets by mouth every 4 (four) hours as needed for pain.     hydrOXYzine HCl (ATARAX) 25 MG tablet TAKE 1-2 TABLETS (25-50 MG TOTAL) BY MOUTH EVERY 6 (SIX) HOURS AS NEEDED (PAIN).     ibuprofen (ADVIL,MOTRIN) 200 MG tablet Take 600 mg by mouth every 6 (six) hours as needed for pain.      ketoconazole (NIZORAL) 2 % shampoo APPLY TO SKIN DAILY AS NEEDED.     magnesium 200 mg Tab Take 1 tablet by mouth daily.     montelukast (SINGULAIR) 10 mg  tablet TAKE 1 TABLET BY MOUTH EVERYDAY AT BEDTIME.     multivitamin (ONE A DAY) per tablet Take 1 tablet by mouth daily.     omega-3 fatty acids-vitamin E (FISH OIL) 1,000 mg cap Take 1 capsule by mouth daily.     omeprazole (PRILOSEC) 20 MG capsule TAKE 1 CAPSULE (20 MG TOTAL) BY MOUTH 2 (TWO) TIMES A DAY BEFORE MEALS.     phentermine (ADIPEX-P) 37.5 mg tablet Take 0.5 tablets (18.75 mg total) by mouth 2 (two) times a day.     pravastatin (PRAVACHOL) 10 MG tablet Take 1 tablet (10 mg total) by mouth daily.     VENTOLIN HFA 90 mcg/actuation inhaler INHALE ONE OR TWO PUFFS BY MOUTH EVERY FOUR HOURS AS NEEDED FOR WHEEZING         Objective:  Vitals:    10/28/19 1521   BP: 114/82   Pulse: 88   Resp: 12   Temp: 97.7  F (36.5  C)   TempSrc: Oral   Weight: 211 lb (95.7 kg)       Body mass index is 33.55 kg/m .    Vital signs reviewed and stable  General: No acute distress  Psych: Appropriate affect  HEENT: moist mucous membranes, pupils equal, round, reactive to light and accomodation, posterior oropharynx clear of erythema or exudate, tympanic membranes are pearly grey bilaterally  Lymph: no cervical or supraclavicular lymphadenopathy  Cardiovascular: regular rate and rhythm with no murmur  Pulmonary: clear to auscultation bilaterally with no wheeze  Abdomen: soft, non tender, non distended with normo-active bowel sounds  Extremities: warm and well perfused with no edema  Skin: warm and dry with no rash         This note has been dictated and transcribed using voice recognition software.   Any errors in transcription are unintentional and inherent to the software.

## 2021-06-03 ENCOUNTER — COMMUNICATION - HEALTHEAST (OUTPATIENT)
Dept: FAMILY MEDICINE | Facility: CLINIC | Age: 51
End: 2021-06-03

## 2021-06-03 ENCOUNTER — RECORDS - HEALTHEAST (OUTPATIENT)
Dept: ADMINISTRATIVE | Facility: CLINIC | Age: 51
End: 2021-06-03

## 2021-06-03 ENCOUNTER — OFFICE VISIT - HEALTHEAST (OUTPATIENT)
Dept: FAMILY MEDICINE | Facility: CLINIC | Age: 51
End: 2021-06-03

## 2021-06-03 VITALS
SYSTOLIC BLOOD PRESSURE: 118 MMHG | RESPIRATION RATE: 12 BRPM | HEART RATE: 100 BPM | HEIGHT: 67 IN | BODY MASS INDEX: 32.93 KG/M2 | WEIGHT: 209.8 LBS | DIASTOLIC BLOOD PRESSURE: 92 MMHG

## 2021-06-03 VITALS
RESPIRATION RATE: 12 BRPM | TEMPERATURE: 97.7 F | DIASTOLIC BLOOD PRESSURE: 82 MMHG | HEART RATE: 88 BPM | WEIGHT: 211 LBS | BODY MASS INDEX: 33.55 KG/M2 | SYSTOLIC BLOOD PRESSURE: 114 MMHG

## 2021-06-03 VITALS
WEIGHT: 223.4 LBS | SYSTOLIC BLOOD PRESSURE: 118 MMHG | BODY MASS INDEX: 35.52 KG/M2 | HEART RATE: 88 BPM | DIASTOLIC BLOOD PRESSURE: 80 MMHG

## 2021-06-03 VITALS — HEIGHT: 67 IN | BODY MASS INDEX: 33.46 KG/M2 | WEIGHT: 213.19 LBS

## 2021-06-03 DIAGNOSIS — E88.819 INSULIN RESISTANCE: ICD-10-CM

## 2021-06-03 DIAGNOSIS — F50.20 BULIMIA NERVOSA: ICD-10-CM

## 2021-06-03 DIAGNOSIS — R06.83 SNORING: ICD-10-CM

## 2021-06-03 DIAGNOSIS — E78.00 PURE HYPERCHOLESTEROLEMIA: ICD-10-CM

## 2021-06-03 LAB — HBA1C MFR BLD: 5 %

## 2021-06-03 ASSESSMENT — MIFFLIN-ST. JEOR: SCORE: 1670.95

## 2021-06-04 VITALS — WEIGHT: 212.5 LBS | BODY MASS INDEX: 33.78 KG/M2

## 2021-06-04 VITALS — BODY MASS INDEX: 35.77 KG/M2 | WEIGHT: 225 LBS

## 2021-06-04 VITALS — WEIGHT: 218 LBS | BODY MASS INDEX: 34.66 KG/M2

## 2021-06-04 NOTE — TELEPHONE ENCOUNTER
Who is calling:  patient  Reason for Call:  Reports she is interested in starting therapy related to her recent test results. Please advise!  Date of last appointment with primary care: 12/30/2019  Okay to leave a detailed message: Yes

## 2021-06-04 NOTE — PROGRESS NOTES
Assessment/Plan:    Sonya Mosqueda is a 49 y.o. female presenting for:    1. Menopausal syndrome (hot flashes)  The patient's FSH came back in the menopausal range.  She is opted to start medication and I sent Premarin low-dose tablets to the pharmacy for her.  She does not have a uterus and will not need any progesterone supplementation.  I did discuss the risks of with the patient particularly the risk of breast cancer and she plans on using the medication for 4 to 6 months and then trialing off.  Discussed that we would like her to use the lowest possible amount.  The 0.3 mg tablets were sent however we can certainly increase this if needed.      We also discussed lifestyle modifications  Such as losing weight and decreasing caffeinated beverages.  We discussed that moderate physical activity can be beneficial as well.  - Follicle Stimulating Hormone (FSH)  - Comprehensive Metabolic Panel  - HM2(CBC w/o Differential)  - Thyroid Cascade    2. Scalp pruritus  Refill of ketoconazole shampoo sent to the pharmacy.  - ketoconazole (NIZORAL) 2 % shampoo; Lather on scalp, leave on for 5 minutes and rinse  Dispense: 120 mL; Refill: 3        Medications Discontinued During This Encounter   Medication Reason     ketoconazole (NIZORAL) 2 % shampoo Reorder           Chief Complaint:  Chief Complaint   Patient presents with     Hot Flashes     Pt had a hysterectomy within the last 5 years ago, but left one ovary.  The hot flashes are happening more during the day, and it's also affecting her sleep        Subjective:   Sonya Mosqueda is a pleasant 49-year-old female presenting to the clinic today with concerns over hot flashes.  Patient had a hysterectomy and one ovary taken out about 5 years ago for a noncancerous purposes.  She states that over the last few months she has been beginning to have what she would consider hot flashes.  These were generally occurring only mostly at night but have been worsening and also occurring  through the day as well.    At night patient will wake up drenched in sweat.  She states that she needs to change her pajamas at least once per night sometimes twice.  She notes that this is affecting her sleeping a great deal.  She thinks that this is worsening a lot and is now hurting her about once per week.    She also notes that sometimes throughout the day she will began to get very hot and sweating.  She has started using deodorant under her breast and in her groin area due to the sweating with these hot flashes.  The day that time hot flashes occur every few days but can last for up to half an hour sometimes.    She states that her mother went through menopause around age 50.  She states that her mother's menopausal symptoms only lasted a few months per her mother's report.    She does not have any personal or family history of breast cancer.  No personal family history of migraines with aura.    Additionally, the patient notes that her scalp is somewhat scaly at times.  She has had a shampoo in the past that has been helpful and is hopeful to potentially get that refilled today.    12 point review of systems completed and negative except for what has been described above    Social History     Tobacco Use   Smoking Status Former Smoker     Packs/day: 1.00     Years: 20.00     Pack years: 20.00   Smokeless Tobacco Never Used   Tobacco Comment    quit 2006       Current Outpatient Medications   Medication Sig     albuterol (VENTOLIN HFA) 90 mcg/actuation inhaler INHALE ONE OR TWO PUFFS BY MOUTH EVERY FOUR HOURS AS NEEDED FOR WHEEZING     ALPRAZolam (XANAX) 0.5 MG tablet Take 1 tablet (0.5 mg total) by mouth 3 (three) times a day as needed for anxiety.     azelastine (ASTELIN) 137 mcg (0.1 %) nasal spray USE 1 SPRAY INTO EACH NOSTRIL 2 TIMES A DAY     beclomethasone (QVAR) 40 mcg/actuation inhaler Inhale 1 puff 2 (two) times a day.     BIOTIN ORAL Take by mouth. 1 tablet daily     CHOLECALCIFEROL, VITAMIN D3,  (VITAMIN D3 ORAL) Take 1 capsule by mouth daily.     diclofenac sodium (VOLTAREN) 1 % Gel APPLY 2-3 GRAMS 3-4 TIMES PER DAY AS NEEDED     DULoxetine (CYMBALTA) 20 MG capsule Take 1 capsule (20 mg total) by mouth 2 (two) times a day.     FIBER, DEXTRIN, ORAL Take 1 tablet by mouth daily.     HUMIRA,CF, PEN 40 mg/0.4 mL PnKt      HYDROcodone-acetaminophen 5-325 mg per tablet Take 1-2 tablets by mouth every 4 (four) hours as needed for pain.     hydrOXYzine HCl (ATARAX) 25 MG tablet TAKE 1-2 TABLETS (25-50 MG TOTAL) BY MOUTH EVERY 6 (SIX) HOURS AS NEEDED (PAIN).     ibuprofen (ADVIL,MOTRIN) 200 MG tablet Take 600 mg by mouth every 6 (six) hours as needed for pain.      ketoconazole (NIZORAL) 2 % shampoo Lather on scalp, leave on for 5 minutes and rinse     magnesium 200 mg Tab Take 1 tablet by mouth daily.     montelukast (SINGULAIR) 10 mg tablet TAKE 1 TABLET BY MOUTH EVERYDAY AT BEDTIME.     multivitamin (ONE A DAY) per tablet Take 1 tablet by mouth daily.     omega-3 fatty acids-vitamin E (FISH OIL) 1,000 mg cap Take 1 capsule by mouth daily.     omeprazole (PRILOSEC) 20 MG capsule Take 1 capsule (20 mg total) by mouth 2 (two) times a day before meals.     pravastatin (PRAVACHOL) 10 MG tablet Take 1 tablet (10 mg total) by mouth daily.     estrogens, conjugated, (PREMARIN) 0.3 MG tablet Take 1 tablet (0.3 mg total) by mouth daily.     phentermine (ADIPEX-P) 37.5 mg tablet Take 0.5 tablets (18.75 mg total) by mouth 2 (two) times a day.         Objective:  Vitals:    12/30/19 1555   BP: 118/80   Pulse: 88   Weight: (!) 223 lb 6.4 oz (101.3 kg)       Body mass index is 35.52 kg/m .    Vital signs reviewed and stable  General: No acute distress  Psych: Appropriate affect  HEENT: moist mucous membranes, pupils equal, round, reactive to light and accomodation, posterior oropharynx clear of erythema or exudate, tympanic membranes are pearly grey bilaterally  Lymph: no cervical or supraclavicular  lymphadenopathy  Cardiovascular: regular rate and rhythm with no murmur  Pulmonary: clear to auscultation bilaterally with no wheeze  Abdomen: soft, non tender, non distended with normo-active bowel sounds  Extremities: warm and well perfused with no edema  Skin: warm and dry with no rash, very mild scaly patches on scalp with no hair loss.         This note has been dictated and transcribed using voice recognition software.   Any errors in transcription are unintentional and inherent to the software.

## 2021-06-07 NOTE — TELEPHONE ENCOUNTER
RN cannot approve Refill Request    RN can NOT refill this medication med is not covered by policy/route to provider     . Last office visit: 12/30/2019 Lamar Mejía MD Last Physical: Visit date not found Last MTM visit: Visit date not found Last visit same specialty: 12/30/2019 Lamar Mejía MD.  Next visit within 3 mo: Visit date not found  Next physical within 3 mo: Visit date not found      Chitra Marte, [facility] Triage/Med Refill 4/13/2020    Requested Prescriptions   Pending Prescriptions Disp Refills     montelukast (SINGULAIR) 10 mg tablet [Pharmacy Med Name: MONTELUKAST SOD 10 MG TABLET] 30 tablet 8     Sig: TAKE 1 TABLET BY MOUTH EVERYDAY AT BEDTIME.       There is no refill protocol information for this order

## 2021-06-07 NOTE — TELEPHONE ENCOUNTER
"Pt reports she takes Xanax 0.5 mg as needed and \"cannot find my pills anywhere\". Requesting a refill.     Controlled Substance Refill Request  Medication Name:   Requested Prescriptions     Pending Prescriptions Disp Refills     ALPRAZolam (XANAX) 0.5 MG tablet 30 tablet 0     Sig: Take 1 tablet (0.5 mg total) by mouth 3 (three) times a day as needed for anxiety.     Date Last Fill: 6/21/19  Requested Pharmacy: [facility]  Submit electronically to pharmacy  Controlled Substance Agreement on file:   Encounter-Level CSA Scan Date:    There are no encounter-level csa scan date.        Last office visit:  12/30/19        "

## 2021-06-08 NOTE — PATIENT INSTRUCTIONS - HE
Dr. Onesimo Gutiérrez MD   - The Obesity Code   - YouTube    Dr. Paul Beck MD   - Always Hungry    Dr. Maria Guadalupe Santillan, DO.   Search for her name in Youtube with added criteria: Nicko Elizondo, PhD   - Why We Sleep   - search for author name and podcast for a number of interviews

## 2021-06-08 NOTE — TELEPHONE ENCOUNTER
"Pt is calling in about a flare up of her Psoriatic Arthritis that she has had for years. Pt is unable to get in to see her Dermatologist, as they are closed. Pt has all the medications that were prescribed to her, but they do not seem to be working so well. Pt does not want to go to a clinic, due to her auto immune immune history. Pt has been scratching them bloody on both hands. Pt reports they are reddened, \"angry looking\" and very itchy and painful.   Discussed the option of going to have them looked at by the [facility], but pt will not go to the [facility] or the ER.  Advised pt to call other Dermatologists to see if she is able to be seen by another doctor. Pt will try to reach out to other clinics, but would like someone from the clinic to call with advice on other options. Advised pt to call back if symptoms worsen. Pt is looking for advice on anything her PCP would recommend.   Please call Sonya at [phone] to advise.    Provider please advise.     Samuel Turner RN [facility] Triage/Medication Refill  "

## 2021-06-08 NOTE — TELEPHONE ENCOUNTER
Medication Request  Medication name:   Phentermine 37.5 mg tablet  topiramate 25 mg tablet    Requested Pharmacy: [facility], # [id number]    Reason for request:   Patient requesting discontinued medications.  Patient requesting to start weight loss medications again.    When did you use medication last?:    Unknown    Patient offered appointment:  yes, but will wait for answer from Provider.    Okay to leave a detailed message: yes

## 2021-06-08 NOTE — PROGRESS NOTES
PULMONARY OUTPATIENT CONSULT NOTE    Assessment:   1. Abnormal CT scan   1.5 cm peripheral pulmonary nodule superior segment left lower lobe with some small adjacent satellite nodules, and left hilar lymphadenopathy. Patient had upper respiratory symptoms prior to CT scan, radiologic findings could be related to an infectious process.   Recommend follow up chest CT scan.  2. Asthma mild persistent under control  Continue LABA/ICS, albuterol HFA as needed  3. GERD   Diet changes, avoid eating to close to bedtime, raise head of bed. PPI daily      Plan:   1. Chest CT scan in one month  2. Continue advair twice a day, albuterol HFA as needed  3. Diet changes, avoid eating to close to bedtime, raise head of bed. PPI daily   4. Follow up in one month with CT scan results.    Thank you for this consultation.     Ric Nielson  Pulmonary / Critical Care  1/11/2017    CC:      Chief Complaint   Patient presents with     Consult     breathing pain     Cough     HPI:       Sonya Mosqueda is an 46 y.o. female who presents for evaluation of abnormal CT scan.   Patient has history of asthma, GERD, hyperlipidemia, anxiety disorder.   Patient presented to ED complaining of worsening left side chest pain on the second week of December, reports upper respiratory symptoms a week prior to ED visit, dry/productive cough, chest pain, night sweats, denies fever. Patient was hemodynamically stable, afebrile, normal CBC , BMP, negative troponin level, normal sinus rhythm on EKG, CXR showed nodular lesion in left hilum. Patient had a follow up chest CT scan, study showed a 1.5 cm peripheral pulmonary nodule superior segment left lower lobe with some small adjacent satellite nodules, and left hilar lymphadenopathy. Patient was referred to this clinic.   Patient denies shortness of breath, wheezes, mild dry cough, improvement of left side chest pain. No fever or chills, no night sweats.   Adequate appetite, occasional acid reflux  symptoms. No abdominal pain, normal bowel movements.   Asthma symptoms are under control, pt had allergy shots until November 2016, uses advair twice a day and rarely uses albuterol HFA.   Previous tobacco user 1ppd for 20 years, quit 10 years ago.    Patient is a teacher, middle school.   Enjoys outdoors activities. Involved in organizing an organic garden. Has a dog.     Past Medical History   Diagnosis Date     Anxiety disorder      Fibromyalgia      GERD (gastroesophageal reflux disease)      HLD (hyperlipidemia)      Medications:     Prior to Admission medications    Medication Sig Start Date End Date Taking? Authorizing Provider   ALPRAZolam (XANAX) 0.5 MG tablet TAKE ONE TABLET BY MOUTH THREE TIMES A DAY AS NEEDED FOR ANXIETY 11/7/16  Yes Reina Soler MD   ALPRAZolam (XANAX) 0.5 MG tablet Take 1 tablet (0.5 mg total) by mouth bedtime as needed for sleep. 1/4/17 2/3/17 Yes Reina Soler MD   beclomethasone (QVAR) 40 mcg/actuation inhaler Inhale 1 puff 2 (two) times a day.   Yes PROVIDER, HISTORICAL   CHOLECALCIFEROL, VITAMIN D3, (VITAMIN D3 ORAL) Take 1 capsule by mouth daily.   Yes PROVIDER, HISTORICAL   DULoxetine (CYMBALTA) 20 MG capsule TAKE 1 CAPSULE (20 MG TOTAL) BY MOUTH 2 (TWO) TIMES A DAY. 11/28/16  Yes Reina Soler MD   FIBER, DEXTRIN, ORAL Take 1 tablet by mouth daily.   Yes PROVIDER, HISTORICAL   fluticasone-salmeterol (ADVAIR) 100-50 mcg/dose DISKUS Inhale 1 puff 2 (two) times a day.   Yes PROVIDER, HISTORICAL   HYDROcodone-acetaminophen 5-325 mg per tablet Take 1-2 tablets by mouth every 4 (four) hours as needed for pain. 8/19/16  Yes Victorina Osman,    hydrOXYzine (VISTARIL) 25 MG capsule TAKE 1-2 CAPSULES BY MOUTH EVERY 4-6 HOURS AS NEEDED FOR PAIN 12/15/16  Yes Reina Soler MD   ibuprofen (ADVIL,MOTRIN) 200 MG tablet Take 600 mg by mouth every 6 (six) hours as needed for pain.    Yes PROVIDER, HISTORICAL   LACTOBACILLUS ACIDOPHILUS (ACIDOPHILUS ORAL) Take 1 capsule by mouth  "daily.   Yes Reina Soler MD   magnesium 200 mg Tab Take 1 tablet by mouth daily.   Yes PROVIDER, HISTORICAL   meloxicam (MOBIC) 15 MG tablet Take 1 tablet (15 mg total) by mouth daily. 12/22/16  Yes Lisette Anand MD   multivitamin (ONE A DAY) per tablet Take 1 tablet by mouth daily.   Yes PROVIDER, HISTORICAL   omega-3 fatty acids-vitamin E (FISH OIL) 1,000 mg cap Take 1 capsule by mouth daily.   Yes PROVIDER, HISTORICAL   omeprazole (PRILOSEC) 20 MG capsule TAKE ONE CAPSULE BY MOUTH ONE TIME DAILY 10/9/16  Yes Reina Soler MD   pravastatin (PRAVACHOL) 10 MG tablet TAKE ONE TABLET BY MOUTH ONE TIME DAILY 12/4/16  Yes Reina Soler MD   VENTOLIN HFA 90 mcg/actuation inhaler INHALE ONE OR TWO PUFFS BY MOUTH EVERY FOUR HOURS AS NEEDED FOR WHEEZING 10/24/16  Yes Reina Soler MD     Social History     Social History     Marital status:      Spouse name: N/A     Number of children: N/A     Years of education: N/A     Occupational History     Not on file.     Social History Main Topics     Smoking status: Former Smoker     Packs/day: 1.00     Years: 20.00     Smokeless tobacco: Not on file      Comment: quit 2006     Alcohol use No     Drug use: Not on file     Sexual activity: Not on file     Other Topics Concern     Not on file     Social History Narrative     Family History   Problem Relation Age of Onset     Breast cancer Paternal Grandmother 60     Stroke Mother      Heart attack Mother      Coronary artery disease Mother      Hypertension Mother      Atrial fibrillation Father      Review of Systems  A 12 point comprehensive review of systems was negative except as noted.        Objective:     Vitals:    01/11/17 1556   BP: 132/84   Pulse: 89   Resp: 17   SpO2: 100%   Weight: 202 lb (91.6 kg)   Height: 5' 7\" (1.702 m)     Gen: awake, alert, no distress  HEENT: pink conjunctiva, moist mucosa, Mallampati II/IV  Neck: no thyromegaly, masses or JVD  Lungs: clear  CV: regular, no murmurs or gallops " appreciated  Abdomen: soft, NT, BS wnl  Ext: no edema  Neuro: CN II-XII intact, non focal      Diagnostic tests:     XR CHEST PA AND LATERAL 12/12/2016 4:45 PM   INDICATION: Chest Pain COMPARISON: 3/2/2016 FINDINGS: On the PA view, there is a poorly defined 1 cm nodule lateral to the left hilum overlying the anterior left fourth rib. This is not apparent on the lateral view. Suggest a nonemergent chest CT for further evaluation. Right lung is clear. Heart and pulmonary vascularity are normal. No effusion.     CT CHEST WO CONTRAST 12/28/2016 6:12 PM   INDICATION: Nodule on chest x-ray. TECHNIQUE: Routine chest. Dose reduction techniques were used. IV CONTRAST: None. COMPARISON: Chest x-ray 12/12/2016. Chest CT 9/6/2011. FINDINGS: LUNGS AND PLEURA: 1.5 cm peripheral nodule in the superior segment of the left lower lobe corresponds to the abnormality on chest x-ray. There are smaller additional satellite nodules. Findings are new compared to the old chest CT. The lungs are otherwise clear. MEDIASTINUM: Left hilar lymphadenopathy. Small paratracheal and aortopulmonic window lymph nodes. No coronary artery calcification. LIMITED UPPER ABDOMEN: Negative. MUSCULOSKELETAL: Negative.   CONCLUSION: 1.  1.5 cm peripheral pulmonary nodule superior segment left lower lobe with some small adjacent satellite nodules, and left hilar lymphadenopathy. Findings are indeterminate for an infectious versus neoplastic process. Consider pulmonary consultation.

## 2021-06-08 NOTE — PROGRESS NOTES
"Sonya Mosqueda is a 49 y.o. female who is being evaluated via a billable video visit.      The patient has been notified of following:     \"This video visit will be conducted via a call between you and your physician/provider. We have found that certain health care needs can be provided without the need for an in-person physical exam.  This service lets us provide the care you need with a video conversation.  If a prescription is necessary we can send it directly to your pharmacy.  If lab work is needed we can place an order for that and you can then stop by our lab to have the test done at a later time.    Video visits are billed at different rates depending on your insurance coverage. Please reach out to your insurance provider with any questions.    If during the course of the call the physician/provider feels a video visit is not appropriate, you will not be charged for this service.\"    Patient has given verbal consent to a Video visit? Yes    Patient would like to receive their AVS by AVS Preference: João.    Patient would like the video invitation sent by: Text to cell phone: [phone]    Will anyone else be joining your video visit? No    Video Start Time: 1:50 PM    Additional provider notes:     Assessment and Plan:     Obesity  49 y.o. year old female in clinic today to discuss treatment of the following conditions through diet and lifestyle modification and weight loss:  1. Class 1 obesity due to excess calories with serious comorbidity and body mass index (BMI) of 34.0 to 34.9 in adult    2. Pure hypercholesterolemia    3. Snoring      The patient's weight loss result since the last visit was successful based on weight loss and improvement in nutrition.  She is walking daily.   She does not identify any barriers at this time.     - continue phentermine/topiramate.  Tolerant of medications.   - continue to avoid starchy carbs    Nutritional goals:   - caloric restriction: ad tyrone   - time restriction: " "15:9 pattern   - diet (macronutrient) restriction: ketogenic     Movement:   - walking    Sleep:    - not discussed    Distress (stress, cortisol, etc):   - improved    Follow-up: 1 months      Continue supplements.    Recommend increasing movement throughout the day--sitting less, moving more.  Will increase activity over time to reach a goal of at least 150 minutes of moderate exercise each week.  Behavior modification:  Cognitive restructuring exercises, journaling stressors, triggers for food cravings.  Dietary Intervention:  Reduced calorie, reduced carbohydrates, whole food diet.  Greater than 50% of this 17 minute visit was spent in counseling regarding patient's obesity, medications, dietary, exercise, and behavior modification recommendations.    Subjective  Patient presents for treatment of chronic, comorbid conditions using intensive therapeutic lifestyle interventions including nutrition, physical activity, and behavior management.   - successes: walking daily.  Off of soda.  Eating well.  Frustrated that she is down 7 lbs in one month. She weights herself daily.  She feels well.  Hunger controlled.  Snacks have decreased and are protein/veggie based.  \"Towards keto based.\"   - struggles: none identified.    - exercise plan: 3 miles per day   - tracking/journaling: ad tyrone (the wellness program is such down at her employer).  She makes a menu with her .   - following nutritional plan: yes.  Deviations from plan: infrequent   - hunger: controlled   - TR: 14-15 hours per day.  \"Just fine\"  She drinks coffee.     - medication: benefits: appetite regularted. side effects: none (\"I gave topiramate more time this time.\")   - psoriasis is much better.    No flowsheet data found.    Patient Active Problem List   Diagnosis     Asthma     Thoracic Outlet Syndrome     Nicotine Dependence - In Remission     Generalized Anxiety Disorder     Osteoarthritis Of The Knee     Vitamin D Deficiency     Pure " hypercholesterolemia     Major Depression, Recurrent     Fibromyalgia     Obesity     Bulimia nervosa     Acid reflux     Venous insufficiency of both lower extremities     Symptomatic varicose veins of both lower extremities     Psoriatic arthritis (H)     Mediastinal adenopathy     Snoring       Current Outpatient Medications on File Prior to Visit   Medication Sig Dispense Refill     albuterol (VENTOLIN HFA) 90 mcg/actuation inhaler INHALE ONE OR TWO PUFFS BY MOUTH EVERY FOUR HOURS AS NEEDED FOR WHEEZING 18 Inhaler 2     ALPRAZolam (XANAX) 0.5 MG tablet Take 1 tablet (0.5 mg total) by mouth 3 (three) times a day as needed for anxiety. 30 tablet 0     beclomethasone (QVAR) 40 mcg/actuation inhaler Inhale 1 puff 2 (two) times a day. 1 Inhaler 12     biotin 5,000 mcg TbDL Take 1 tablet by mouth daily. 1 tablet daily       cholecalciferol, vitamin D3, (VITAMIN D3) 50 mcg (2,000 unit) Tab Take 1 capsule by mouth daily.        dextrin (FIBER, DEXTRIN,) 3 gram/3.5 gram Powd Take 1 tablet by mouth daily.        diclofenac sodium (VOLTAREN) 1 % Gel APPLY 2-3 GRAMS 3-4 TIMES PER DAY AS NEEDED  0     DULoxetine (CYMBALTA) 20 MG capsule Take 1 capsule (20 mg total) by mouth 2 (two) times a day. 180 capsule 3     HYDROcodone-acetaminophen 5-325 mg per tablet Take 1-2 tablets by mouth every 4 (four) hours as needed for pain. 15 tablet 0     hydrOXYzine HCl (ATARAX) 25 MG tablet TAKE 1-2 TABLETS (25-50 MG TOTAL) BY MOUTH EVERY 6 (SIX) HOURS AS NEEDED (PAIN). 360 tablet 3     ibuprofen (ADVIL,MOTRIN) 200 MG tablet Take 600 mg by mouth every 6 (six) hours as needed for pain.        ketoconazole (NIZORAL) 2 % shampoo Lather on scalp, leave on for 5 minutes and rinse 120 mL 3     magnesium 200 mg Tab Take 1 tablet by mouth daily.       montelukast (SINGULAIR) 10 mg tablet TAKE 1 TABLET BY MOUTH EVERYDAY AT BEDTIME. 30 tablet 8     multivitamin (ONE A DAY) per tablet Take 1 tablet by mouth daily.       omega-3 fatty acids-vitamin E  (FISH OIL) 1,000 mg cap Take 1 capsule by mouth daily.       omeprazole (PRILOSEC) 20 MG capsule Take 1 capsule (20 mg total) by mouth 2 (two) times a day before meals. 180 capsule 2     pravastatin (PRAVACHOL) 10 MG tablet Take 1 tablet (10 mg total) by mouth daily. 90 tablet 2     [DISCONTINUED] phentermine 15 MG capsule Take 1 capsule (15 mg total) by mouth every morning. 30 capsule 0     [DISCONTINUED] topiramate (TOPAMAX) 25 MG tablet Take 1 tablet (25 mg total) by mouth 2 (two) times a day. (1 tablet/night for first 7 days.  Add AM dose if no side effects) 60 tablet 1     No current facility-administered medications on file prior to visit.        Objective:  There were no vitals filed for this visit.  Initial Weight: 225 lbs  Weight: 218 lb (98.9 kg)  Weight loss from initial: 7  % Weight loss: 3.11 %    Body mass index is 34.66 kg/m .  No LMP recorded. Patient has had a hysterectomy.  GENERAL: Healthy, alert and no distress  EYES: Eyes grossly normal to inspection. No discharge or erythema, or obvious scleral/conjunctival abnormalities.  RESP: No audible wheeze, cough, or visible cyanosis.  No visible retractions or increased work of breathing.    SKIN: Visible skin clear. No significant rash, abnormal pigmentation or lesions.  NEURO: Cranial nerves grossly intact. Mentation and speech appropriate for age.  PSYCH: Mentation appears normal, affect normal/bright, judgement and insight intact, normal speech and appearance well-groomed    This note has been dictated using voice recognition software. Any grammatical or context distortions are unintentional and inherent to the software    Video-Visit Details    Type of service:  Video Visit    Video End Time (time video stopped): 2:07 PM  Originating Location (pt. Location): Home    Distant Location (provider location):  [facility]     Platform used for Video Visit: Allan Salinas MD

## 2021-06-08 NOTE — PROGRESS NOTES
ASSESSMENT/PLAN:     Ms Mosqueda is a 46-year-old  female with a history of psoriasis, fibromyalgia, depression, anxiety, gastroesophageal reflux disease who presents for follow up after her last visit on 12/22/16.     Patient has a history of psoriasis, stiffness in the joints in the morning that can last up to an hour.  Her lab work as outlined below including rheumatoid factor, anti-CCP, HLA-B27 were negative.  Her imaging reveals some left capitate carpal bone cystic changes, mild degenerative changes in the sacroiliac joints, degenerative changes in the first MTP bilaterally.  At present given her inflammatory type of joint pain as well as cystic changes in the carpal bone would like to treat as psoriatic arthritis.  Would ideally start with methotrexate given that she does not appear to have significant axial involvement.  However, patient has abnormal finding of 1.5 cm nodule in the left lower lobe with smaller satellite lesions and hilar lymphadenopathy.  Need to ensure that this is not latent infectious process prior to initiating therapy with immunosuppressive medication.  Patient has consultation with pulmonologist tomorrow to see about further evaluation and recommendations for this nodule.  If it is unclear or this is related to latent infection then would rather consider a medication such as apremilast/otezla.     Assessments and associated orders:   1. Psoriatic arthritis     2. Fibromyalgia     3. Lung nodule < 6cm on CT             Risk and benefits of medications were addressed during this visit for possible future treatment. Handouts and educational materials  were provided to patient.     Return to clinic in 1 month for f/u.        SUBJECTIVE:     Sonya Mosqueda is a 46 y.o. female with history of   Patient Active Problem List   Diagnosis     Nephrolithiasis     Esophageal Reflux     Asthma     Delayed Post-traumatic Stress Disorder     Thoracic Outlet Syndrome     Vocal Cord Polyp      Temporomandibular Joint-pain Dysfunction Syndrome     Endometriosis     Spontaneous      Nicotine Dependence - In Remission     Fatigue     Generalized Anxiety Disorder     Tendonitis     Scabies     Osteoarthritis Of The Knee     Dermatographia     Joint Pain Fingers     Vitamin D Deficiency     Alcohol Abuse - In Remission     Pure hypercholesterolemia     Arthralgias In Multiple Sites     Skin: A Rash     Major Depression, Recurrent     Fibromyalgia     Cervical Disc Degeneration     Obesity     Bulimia nervosa     Clinical depression     Amnesia     Acid reflux     Alopecia     Chest pain     Atypical chest pain     Venous insufficiency of both lower extremities     Symptomatic varicose veins of both lower extremities    here for follow up on  1/10/2017.      Ms Mosqueda is a 46-year-old  female with a history of psoriasis, fibromyalgia, depression, anxiety, gastroesophageal reflux disease who presents for follow up after her last visit on 16.  Since last visit she had x-rays of the sacroiliac joints which showed some mild degenerative changes and no sacroiliitis.  She also had x-rays of the hands which showed possible left capitate cystic changes in the carpal bone.  The x-rays of the feet revealed bilateral first MTP degenerative arthritis mild.  The x-rays of the knees were within normal range.  She also had lab work done which revealed a normal quantiferon, hepatitis B, hepatitis C, HLA B27, CCP, CRP, ESR, TSH, CBC.  Her CMP was normal outside of a slightly low albumin at 3.3.  Since her last visit she also had a CT scan of the chest to further evaluate abnormality seen on chest x-ray.  CT scan of the chest revealed a 1.5 cm nodule with smaller satellite lesions in the left lower lobe and left hilar lymphadenopathy.  She is seeing a pulmonologist tomorrow for further evaluation.  She is currently taking Cymbalta for her fibromyalgia.  At last visit we started her on meloxicam 15 mg daily.  "    Pertinent History:  Patient states that she was recently diagnosed with psoriasis within the last year and it involves primarily the scalp. She has been using topical agents per her dermatologist with good results.  Today she states that she has had a 10 year history of \"pains\". What bothers her the most now is usually the bilateral knees, lower back, hips, right foot. She occasionally notices swelling in the left knee as well as in the second MCP of the bilateral hands more so on the right. The swelling in the hands is associated with redness. She also has stiffness diffusely in the joints that lasts for about an hour but it is worse in the knees bilaterally as well as the foot. She feels pain primarily in the dorsum of the right foot when she is walking and when she first gets up in the morning. Occasionally she will feel as though the food gets stuck in her throat about 3 times a week but usually is able to swallow it with time. She sees a pain medicine specialist and takes hydrocodone as needed. She also takes ibuprofen which helps a little bit.   For fibromyalgia she has been on numerous medications including Lyrica, gabapentin, tramadol, possibly amitriptyline, Toradol- however had side effects to most of these medications. She is currently on Cymbalta alone which helps with her anxiety and depression. She has never had any issues with suicidal ideation and the depression was largely situational per her report due to family members passing away.  She otherwise denies any ulcers in the mouth or the nose however has a sore on her tongue today, she has dry eyes, no dry mouth, no Raynaud's symptoms, no photosensitivity. She has had significant hair loss however this improved after seeing the dermatologist and starting topical agents.     Otherwise, she denies any fevers, chills, shakes, nausea, vomiting, diarrhea, chest pain, shortness of breath, or headache.        Review of Systems:  General: No weight " loss/weight gain, no fatigue  Eyes: No visual changes or vision loss  Nose: no ulcers in the nose, no nasal discharge, no epistaxis  Ears: no changes in hearing, or hearing loss  Cardiovascular: no chest pain, palpiations  Respiratory: no chronic cough, productive cough, shortness of breath  Gastrointestinal: no abdominal pain, nausea, vomiting, diarrhea, melena, hematochezia  Musculoskeletal: see HPI  Neurological: no seizures, stroke, focal weakness, numbness/tingling  Endocrine: no heat or cold intolerance    Limitation on activities as noted in the MDHAQ scanned in the EMR. Further historical information, including ROS as noted in the multidimensional health assessment questionnaire scanned in the EMR and in the assessment and plan section.    Past Medical History   Diagnosis Date     Anxiety disorder      Fibromyalgia      GERD (gastroesophageal reflux disease)      HLD (hyperlipidemia)      Past Surgical History   Procedure Laterality Date     Pr dilation/curettage,diagnostic       Description: Dilation And Curettage;  Recorded: 12/06/2007;     Pr revise median n/carpal tunnel surg       Description: Neuroplasty Decompression Median Nerve At Carpal Tunnel;  Recorded: 12/06/2007;     Pr cryocautery of cervix       Description: Cervix Cryosurgery;  Recorded: 12/06/2007;     Pr total abdom hysterectomy       Description: Hysterectomy;  Proc Date: 12/01/2013;  Comments: both ovaries gone     Hysterectomy  2013     Oophorectomy Right 2013     Social History   Substance Use Topics     Smoking status: Former Smoker     Packs/day: 1.00     Years: 20.00     Smokeless tobacco: Not on file     Alcohol use No     Family History   Problem Relation Age of Onset     Breast cancer Paternal Grandmother 60     Stroke Mother      Heart attack Mother      Coronary artery disease Mother      Hypertension Mother      Atrial fibrillation Father          Current Outpatient Prescriptions   Medication Sig Dispense Refill     ALPRAZolam  (XANAX) 0.5 MG tablet TAKE ONE TABLET BY MOUTH THREE TIMES A DAY AS NEEDED FOR ANXIETY 30 tablet 0     ALPRAZolam (XANAX) 0.5 MG tablet Take 1 tablet (0.5 mg total) by mouth bedtime as needed for sleep. 30 tablet 0     beclomethasone (QVAR) 40 mcg/actuation inhaler Inhale 1 puff 2 (two) times a day.       CHOLECALCIFEROL, VITAMIN D3, (VITAMIN D3 ORAL) Take 1 capsule by mouth daily.       DULoxetine (CYMBALTA) 20 MG capsule TAKE 1 CAPSULE (20 MG TOTAL) BY MOUTH 2 (TWO) TIMES A DAY. 180 capsule 3     FIBER, DEXTRIN, ORAL Take 1 tablet by mouth daily.       fluticasone-salmeterol (ADVAIR) 100-50 mcg/dose DISKUS Inhale 1 puff 2 (two) times a day.       HYDROcodone-acetaminophen 5-325 mg per tablet Take 1-2 tablets by mouth every 4 (four) hours as needed for pain. 15 tablet 0     hydrOXYzine (VISTARIL) 25 MG capsule TAKE 1-2 CAPSULES BY MOUTH EVERY 4-6 HOURS AS NEEDED FOR PAIN 40 capsule 3     ibuprofen (ADVIL,MOTRIN) 200 MG tablet Take 600 mg by mouth every 6 (six) hours as needed for pain.        LACTOBACILLUS ACIDOPHILUS (ACIDOPHILUS ORAL) Take 1 capsule by mouth daily.       magnesium 200 mg Tab Take 1 tablet by mouth daily.       meloxicam (MOBIC) 15 MG tablet Take 1 tablet (15 mg total) by mouth daily. 30 tablet 3     multivitamin (ONE A DAY) per tablet Take 1 tablet by mouth daily.       omega-3 fatty acids-vitamin E (FISH OIL) 1,000 mg cap Take 1 capsule by mouth daily.       omeprazole (PRILOSEC) 20 MG capsule TAKE ONE CAPSULE BY MOUTH ONE TIME DAILY 90 capsule 1     pravastatin (PRAVACHOL) 10 MG tablet TAKE ONE TABLET BY MOUTH ONE TIME DAILY 90 tablet 0     VENTOLIN HFA 90 mcg/actuation inhaler INHALE ONE OR TWO PUFFS BY MOUTH EVERY FOUR HOURS AS NEEDED FOR WHEEZING 18 Inhaler 0     No current facility-administered medications for this visit.        SEE STANDARD SCANNED SHEET FOR MORE DETAILS OF INFORMATION DISCUSSED including expectations sheet.  Reviewed during visit today.     HPI information along with  expectation sheet addressed and fully covered as per patient agreement before leaving office visit today.     See staff notes with full reviewed documented details.      OBJECTIVE:  Vitals:    01/10/17 1435   BP: 128/84   Pulse: 72   Weight: 200 lb (90.7 kg)       General Appearance: Pleasant, alert, appropriate appearance for age. No acute distress  Head Exam: Normal. Normocephalic, atraumatic.  Eye Exam: Normal external eye, conjunctiva, lids, cornea. FREEMAN.  Ear Exam: Normal TM's bilaterally. Normal auditory canals and external ears.   Nose Exam: Normal external nose, mucus membranes, and septum. No ulcers  OroPharynx Exam: Dental hygiene adequate. Normal buccal mucosa. Normal pharynx. No ulcers.  Neck Exam: Supple, no masses or nodes.Thyroid Exam: No nodules or enlargement.  Chest/Respiratory Exam: Normal chest wall and respirations. Clear to auscultation.  Cardiovascular Exam: Regular rate and rhythm. S1, S2, no murmur, click, gallop, or rubs.  Gastrointestinal Exam: Soft, non-tender, no masses or organomegaly.  Lymphatic Exam: Non-palpable nodes in neck, clavicular, axillary, or inguinal regions.  Skin: no rash  Neurologic Exam: Nonfocal, normal gross motor and sensory exams, no tremor.    MSK: No synovitis in the hands or the wrists. Full range of motion in the wrists, elbows, shoulders with the right shoulder having mildly positive Martinez test. No effusions in the knees noted decreased flexion in the left knee with tenderness to palpation around the knee joint. No synovitis in the ankles or the toes. Positive Ramiro's test on the left. No dactylitis or enthesitis noted.    Lab Results   Component Value Date    WBC 5.8 12/22/2016    HGB 13.0 12/22/2016    HCT 38.7 12/22/2016    MCV 79 (L) 12/22/2016     12/22/2016     Lab Results   Component Value Date    ALT 42 12/22/2016    AST 35 12/22/2016    ALKPHOS 71 12/22/2016    BILITOT 0.4 12/22/2016

## 2021-06-08 NOTE — PROGRESS NOTES
ASSESSMENT/PLAN:     Ms Mosqueda is a 46-year-old  female with a history of psoriasis, psoriatic arthritis, anxiety, gastroesophageal reflux disease who presents for follow up after her last visit on 1/10/17.     Patient has a history of psoriasis, stiffness in the joints in the morning that can last up to an hour.  Her lab work as outlined below including rheumatoid factor, anti-CCP, HLA-B27 were negative.  Her imaging reveals some left capitate carpal bone cystic changes, mild degenerative changes in the sacroiliac joints, degenerative changes in the first MTP bilaterally.  At present given her inflammatory type of joint pain as well as cystic changes in the carpal bone would like to treat for psoriatic arthritis.  Would ideally start with methotrexate given that she does not appear to have significant axial involvement however, patient has abnormal finding of 1.5 cm nodule in the left lower lobe with smaller satellite lesions and hilar lymphadenopathy. Since it is unclear if this is related to latent infection then would rather start apremilast/Otezla given the decreased risk of lung adverse effects and infectious adverse effects.  Patient had a flare up in her joint pain in the left knee about a week ago which was associated with swelling and redness.  She had difficulty standing on the knee with locking.  Status post corticosteroid injection to the left knee today.    -Adverse effects and risks of Otezla discussed in detail with patient including but not limited to abdominal discomfort, nausea, diarrhea, worsening suicidal ideation, depression, weight loss, upper respiratory infections.  Patient understands these risks and would like to proceed with the medication.  She has never had any issues with suicidal ideation and the depression was largely situational per her report due to family members passing away. Patient was given starter pack sample for 14-d supply  -Start prior authorization for Otezla  today.      Patient consented for procedure and appropriate site marked.  Site cleaned in usual sterile fashion using betadine and alcohol.  A 25-gauge needle was used to inject a mixture of 1cc lidocaine and 1cc kenalog 40mg into the left knee using superomedial approach.  Needle removed, hemostasis achieved. Patient tolerated procedure well with no immediate complications.       Assessments and associated orders:   1. Psoriatic arthritis     2. Left knee pain             Risk and benefits of medications were addressed during this visit for possible future treatment. Handouts and educational materials  were provided to patient.     Return to clinic in 1 month for f/u.        SUBJECTIVE:     Sonya Mosqueda is a 46 y.o. female with history of   Patient Active Problem List   Diagnosis     Nephrolithiasis     Esophageal Reflux     Asthma     Delayed Post-traumatic Stress Disorder     Thoracic Outlet Syndrome     Vocal Cord Polyp     Temporomandibular Joint-pain Dysfunction Syndrome     Endometriosis     Spontaneous      Nicotine Dependence - In Remission     Fatigue     Generalized Anxiety Disorder     Tendonitis     Scabies     Osteoarthritis Of The Knee     Dermatographia     Joint Pain Fingers     Vitamin D Deficiency     Alcohol Abuse - In Remission     Pure hypercholesterolemia     Arthralgias In Multiple Sites     Skin: A Rash     Major Depression, Recurrent     Fibromyalgia     Cervical Disc Degeneration     Obesity     Bulimia nervosa     Clinical depression     Amnesia     Acid reflux     Alopecia     Chest pain     Atypical chest pain     Venous insufficiency of both lower extremities     Symptomatic varicose veins of both lower extremities    here for follow up on  2017.      Ms Mosqueda is a 46-year-old  female with a history of psoriasis, fibromyalgia, depression, anxiety, gastroesophageal reflux disease who presents for follow up after her last visit on 1/10/17.  Since last visit she was  "seen by pulmonary for a 1.5 cm nodule with smaller satellite lesions in the left lower lobe and left hilar lymphadenopathy.  She is to have repeat imaging done in about 2-3 weeks.  Unable to confirm that it is not related to infectious etiology.    She has had x-rays of the sacroiliac joints which showed some mild degenerative changes and no sacroiliitis.  She also had x-rays of the hands which showed possible left capitate cystic changes in the carpal bone.  The x-rays of the feet revealed bilateral first MTP degenerative arthritis mild.  The x-rays of the knees were within normal range.  She also had lab work done which revealed a normal quantiferon, hepatitis B, hepatitis C, HLA B27, CCP, CRP, ESR, TSH, CBC.  Her CMP was normal outside of a slightly low albumin at 3.3.  She is currently taking Cymbalta for her fibromyalgia.  At last visit we started her on meloxicam 15 mg daily.     Pertinent History:  Patient states that she was recently diagnosed with psoriasis within the last year and it involves primarily the scalp. She has been using topical agents per her dermatologist with good results.  Today she states that she has had a 10 year history of \"pains\". What bothers her the most now is usually the bilateral knees, lower back, hips, right foot. She occasionally notices swelling in the left knee as well as in the second MCP of the bilateral hands more so on the right. The swelling in the hands is associated with redness. She also has stiffness diffusely in the joints that lasts for about an hour but it is worse in the knees bilaterally as well as the foot. She feels pain primarily in the dorsum of the right foot when she is walking and when she first gets up in the morning. Occasionally she will feel as though the food gets stuck in her throat about 3 times a week but usually is able to swallow it with time. She sees a pain medicine specialist and takes hydrocodone as needed. She also takes ibuprofen which " helps a little bit.   For fibromyalgia she has been on numerous medications including Lyrica, gabapentin, tramadol, possibly amitriptyline, Toradol- however had side effects to most of these medications. She is currently on Cymbalta alone which helps with her anxiety and depression. She has never had any issues with suicidal ideation and the depression was largely situational per her report due to family members passing away.  She otherwise denies any ulcers in the mouth or the nose however has a sore on her tongue today, she has dry eyes, no dry mouth, no Raynaud's symptoms, no photosensitivity. She has had significant hair loss however this improved after seeing the dermatologist and starting topical agents.     Otherwise, she denies any fevers, chills, shakes, nausea, vomiting, diarrhea, chest pain, shortness of breath, or headache.        Review of Systems:  General: No weight loss/weight gain, no fatigue  Eyes: No visual changes or vision loss  Nose: no ulcers in the nose, no nasal discharge, no epistaxis  Ears: no changes in hearing, or hearing loss  Cardiovascular: no chest pain, palpiations  Respiratory: no chronic cough, productive cough, shortness of breath  Gastrointestinal: no abdominal pain, nausea, vomiting, diarrhea, melena, hematochezia  Musculoskeletal: see HPI  Neurological: no seizures, stroke, focal weakness, numbness/tingling  Endocrine: no heat or cold intolerance    Limitation on activities as noted in the MDHAQ scanned in the EMR. Further historical information, including ROS as noted in the multidimensional health assessment questionnaire scanned in the EMR and in the assessment and plan section.    Past Medical History   Diagnosis Date     Anxiety disorder      Fibromyalgia      GERD (gastroesophageal reflux disease)      HLD (hyperlipidemia)      Past Surgical History   Procedure Laterality Date     Pr dilation/curettage,diagnostic       Description: Dilation And Curettage;  Recorded:  12/06/2007;     Pr revise median n/carpal tunnel surg       Description: Neuroplasty Decompression Median Nerve At Carpal Tunnel;  Recorded: 12/06/2007;     Pr cryocautery of cervix       Description: Cervix Cryosurgery;  Recorded: 12/06/2007;     Pr total abdom hysterectomy       Description: Hysterectomy;  Proc Date: 12/01/2013;  Comments: both ovaries gone     Hysterectomy  2013     Oophorectomy Right 2013     Social History   Substance Use Topics     Smoking status: Former Smoker     Packs/day: 1.00     Years: 20.00     Smokeless tobacco: Not on file      Comment: quit 2006     Alcohol use No     Family History   Problem Relation Age of Onset     Breast cancer Paternal Grandmother 60     Stroke Mother      Heart attack Mother      Coronary artery disease Mother      Hypertension Mother      Atrial fibrillation Father          Current Outpatient Prescriptions   Medication Sig Dispense Refill     ALPRAZolam (XANAX) 0.5 MG tablet TAKE ONE TABLET BY MOUTH THREE TIMES A DAY AS NEEDED FOR ANXIETY 30 tablet 0     ALPRAZolam (XANAX) 0.5 MG tablet Take 1 tablet (0.5 mg total) by mouth bedtime as needed for sleep. 30 tablet 0     beclomethasone (QVAR) 40 mcg/actuation inhaler Inhale 1 puff 2 (two) times a day.       CHOLECALCIFEROL, VITAMIN D3, (VITAMIN D3 ORAL) Take 1 capsule by mouth daily.       DULoxetine (CYMBALTA) 20 MG capsule TAKE 1 CAPSULE (20 MG TOTAL) BY MOUTH 2 (TWO) TIMES A DAY. 180 capsule 3     FIBER, DEXTRIN, ORAL Take 1 tablet by mouth daily.       fluticasone-salmeterol (ADVAIR) 100-50 mcg/dose DISKUS Inhale 1 puff 2 (two) times a day.       HYDROcodone-acetaminophen 5-325 mg per tablet Take 1-2 tablets by mouth every 4 (four) hours as needed for pain. 15 tablet 0     hydrOXYzine (VISTARIL) 25 MG capsule TAKE 1-2 CAPSULES BY MOUTH EVERY 4-6 HOURS AS NEEDED FOR PAIN 40 capsule 3     ibuprofen (ADVIL,MOTRIN) 200 MG tablet Take 600 mg by mouth every 6 (six) hours as needed for pain.        LACTOBACILLUS  ACIDOPHILUS (ACIDOPHILUS ORAL) Take 1 capsule by mouth daily.       magnesium 200 mg Tab Take 1 tablet by mouth daily.       meloxicam (MOBIC) 15 MG tablet Take 1 tablet (15 mg total) by mouth daily. 30 tablet 3     multivitamin (ONE A DAY) per tablet Take 1 tablet by mouth daily.       omega-3 fatty acids-vitamin E (FISH OIL) 1,000 mg cap Take 1 capsule by mouth daily.       omeprazole (PRILOSEC) 20 MG capsule TAKE ONE CAPSULE BY MOUTH ONE TIME DAILY 90 capsule 1     pravastatin (PRAVACHOL) 10 MG tablet TAKE ONE TABLET BY MOUTH ONE TIME DAILY 90 tablet 0     VENTOLIN HFA 90 mcg/actuation inhaler INHALE ONE OR TWO PUFFS BY MOUTH EVERY FOUR HOURS AS NEEDED FOR WHEEZING 18 Inhaler 0     No current facility-administered medications for this visit.        SEE STANDARD SCANNED SHEET FOR MORE DETAILS OF INFORMATION DISCUSSED including expectations sheet.  Reviewed during visit today.     HPI information along with expectation sheet addressed and fully covered as per patient agreement before leaving office visit today.     See staff notes with full reviewed documented details.      OBJECTIVE:  Vitals:    01/24/17 1509   BP: 116/74   Pulse: 88   Weight: 202 lb (91.6 kg)       General Appearance: Pleasant, alert, appropriate appearance for age. No acute distress  Head Exam: Normal. Normocephalic, atraumatic.  Eye Exam: Normal external eye, conjunctiva, lids, cornea. FREEMAN.  Ear Exam: Normal TM's bilaterally. Normal auditory canals and external ears.   Nose Exam: Normal external nose, mucus membranes, and septum. No ulcers  OroPharynx Exam: Dental hygiene adequate. Normal buccal mucosa. Normal pharynx. No ulcers.  Neck Exam: Supple, no masses or nodes.Thyroid Exam: No nodules or enlargement.  Chest/Respiratory Exam: Normal chest wall and respirations. Clear to auscultation.  Cardiovascular Exam: Regular rate and rhythm. S1, S2, no murmur, click, gallop, or rubs.  Gastrointestinal Exam: Soft, non-tender, no masses or  organomegaly.  Lymphatic Exam: Non-palpable nodes in neck, clavicular, axillary, or inguinal regions.  Skin: no rash  Neurologic Exam: Nonfocal, normal gross motor and sensory exams, no tremor.    MSK: No synovitis in the hands or the wrists. Full range of motion in the wrists, elbows, shoulders with the right shoulder having mildly positive Martinez test. No effusions in the knees, noted decreased flexion in the left knee with tenderness to palpation around the knee joint. No synovitis in the ankles or the toes. Positive Ramiro's test on the left. No dactylitis or enthesitis noted.    Lab Results   Component Value Date    WBC 5.8 12/22/2016    HGB 13.0 12/22/2016    HCT 38.7 12/22/2016    MCV 79 (L) 12/22/2016     12/22/2016     Lab Results   Component Value Date    ALT 42 12/22/2016    AST 35 12/22/2016    ALKPHOS 71 12/22/2016    BILITOT 0.4 12/22/2016

## 2021-06-08 NOTE — TELEPHONE ENCOUNTER
Pt plans on doing an e-visit with Dr. Mejía and then she will do a Video Visit if Dr. Mejía thinks its necessary.

## 2021-06-08 NOTE — TELEPHONE ENCOUNTER
Maybe she could do an Evisit outlining what she has been using (name, strength of med, how often), what has historically worked and pictures?    Thanks    BB

## 2021-06-08 NOTE — PROGRESS NOTES
Assessment & Plan:  1. Fibromyalgia  Patient had been referred to pain clinic to help manage pain from her fibromyalgia. I had limited her on narcotics as it is not ideal to treat with fibromyalgia and she did not feel pain relief was adequate. She is frustrated the [facility] pain clinic and does not feel they are providing her the best care. Will make a referral for another pain clinic either [facility] pain clinic if there's accepting new ones or [facility] pain clinic in [place].  - Ambulatory referral to Pain Clinic    2. Generalized anxiety disorder  Patient with a history of anxiety. Needs a refill of appraisal him. Cautioned her to use it sparingly and she understands the risks of the medication  - ALPRAZolam (XANAX) 0.5 MG tablet; TAKE ONE TABLET BY MOUTH THREE TIMES A DAY AS NEEDED FOR ANXIETY  Dispense: 30 tablet; Refill: 0    3. Psoriatic arthritis  Patient with new diagnosis of psoriatic arthritis. She is being managed by rheumatology. Will also refer her to new pain clinic to help keep her pain under control  - Ambulatory referral to Pain Clinic        Orders Placed This Encounter   Procedures     Ambulatory referral to Pain Clinic     Referral Priority:   Routine     Referral Type:   Consultation     Referral Reason:   Evaluation and Treatment     Requested Specialty:   Pain Medicine     Number of Visits Requested:   1     Medications Discontinued During This Encounter   Medication Reason     ALPRAZolam (XANAX) 0.5 MG tablet Reorder       No Follow-up on file.    The following high BMI interventions were performed this visit: encouragement to exercise      This note was created use Dragon Dictation.  There may be sound alike errors present.       Chief Complaint:   Chief Complaint   Patient presents with     Follow-up     Recent rheumatology appt     Medication Refill     Alprazolam       History of Present Illness:  Sonya is a 46 y.o. female presenting to the clinic today To update me on her  multiple specialist appointments. Patient has seen pulmonology for a nodule in her chest wall. She's having a follow-up CT done this week to see if there's been any change in the nodule.  Next she's seen rheumatology and has been diagnosed with psoriatic arthritis. She has waiting for some labs to come back and they are planning to start her on Daisy. She started it and developed headache and nausea. This week she's decreased to a half the dose to see if she tolerates the side effects. She states she has gotten some relief from her joint aches but not enough to justify the headaches and the nausea. She will follow up with rheumatology for that dosing. She's also has fibromyalgia which makes it difficult to treat    Finally she's been seen [facility] pain clinic to manage her fibromyalgia. She's very frustrated with the clinic as it is dirty, they make her weight they are long periods of time and she is gotten the wrong medications. She also received a call from a patient from the clinic and they were given her medications with all of her personal contact information on it. She is been going there since this fall and the first time the physician is actually examined and touched her was this month. She does not trust this pain clinic and would like to change to another one. She states she does not like being on the Vicodin but it's the only thing that helps with her fibromyalgia pain.  She continues on the Cymbalta and that has kept her mood stable and her pain and somewhat control. Patient has all praise a lamb for when her anxiety gets bad. She does not use it daily but likes to have it on hand.      .     Review of Systems:  All other systems are negative.     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      Physical Exam:  Constitutional:  Reveals an alert, cooperative,  female in no acute distress.  Vitals:  Per nursing notes.  Neck:  Supple, no lymphadenopathy,  thyroid not palpable.  Cardiac:  Regular rate and rhythm without murmurs, rubs, or gallops.   Lungs: Clear.  Respiratory effort normal.  Neurologic:  No gross focal deficits.    Psychiatric:  Mood appropriate, memory intact.     Data Reviewed:  Additional history summarized (from old records or history from someone other than the patient or another healthcare provider) (2 TOTAL): 2 Reviewed notes from rheumatology, pulmonology.     Decision to obtain extra information (old records requested or history from another person or accessing Care Everywhere) (1 TOTAL): None.     Radiology tests summarized or ordered (XRAY/CT/MRI/DXA) (1 TOTAL): 1 Reviewed CT scan.    Labs reviewed or ordered (1 TOTAL): 1 Reviewed labs from rheumatology.    Medicine tests summarized or ordered (EKG/ECHO) (1 TOTAL): None.    Independent review of EKG or X-Ray (2 EACH): None.       The visit lasted a total of 25 minutes face to face with the patient. Over 50% of the time was spent counseling and educating the patient about Fibromyalgia.        Medications:  Current Outpatient Prescriptions   Medication Sig Dispense Refill     ALPRAZolam (XANAX) 0.5 MG tablet TAKE ONE TABLET BY MOUTH THREE TIMES A DAY AS NEEDED FOR ANXIETY 30 tablet 0     APREMILAST (OTEZLA ORAL) Take by mouth.       beclomethasone (QVAR) 40 mcg/actuation inhaler Inhale 1 puff 2 (two) times a day.       CHOLECALCIFEROL, VITAMIN D3, (VITAMIN D3 ORAL) Take 1 capsule by mouth daily.       DULoxetine (CYMBALTA) 20 MG capsule TAKE 1 CAPSULE (20 MG TOTAL) BY MOUTH " 2 (TWO) TIMES A DAY. 180 capsule 3     FIBER, DEXTRIN, ORAL Take 1 tablet by mouth daily.       fluticasone-salmeterol (ADVAIR) 100-50 mcg/dose DISKUS Inhale 1 puff 2 (two) times a day.       HYDROcodone-acetaminophen 5-325 mg per tablet Take 1-2 tablets by mouth every 4 (four) hours as needed for pain. 15 tablet 0     hydrOXYzine (VISTARIL) 25 MG capsule TAKE 1-2 CAPSULES BY MOUTH EVERY 4-6 HOURS AS NEEDED FOR PAIN 40 capsule 3     ibuprofen (ADVIL,MOTRIN) 200 MG tablet Take 600 mg by mouth every 6 (six) hours as needed for pain.        LACTOBACILLUS ACIDOPHILUS (ACIDOPHILUS ORAL) Take 1 capsule by mouth daily.       magnesium 200 mg Tab Take 1 tablet by mouth daily.       meloxicam (MOBIC) 15 MG tablet Take 1 tablet (15 mg total) by mouth daily. 30 tablet 3     multivitamin (ONE A DAY) per tablet Take 1 tablet by mouth daily.       omega-3 fatty acids-vitamin E (FISH OIL) 1,000 mg cap Take 1 capsule by mouth daily.       omeprazole (PRILOSEC) 20 MG capsule TAKE ONE CAPSULE BY MOUTH ONE TIME DAILY 90 capsule 1     pravastatin (PRAVACHOL) 10 MG tablet TAKE ONE TABLET BY MOUTH ONE TIME DAILY 90 tablet 0     VENTOLIN HFA 90 mcg/actuation inhaler INHALE ONE OR TWO PUFFS BY MOUTH EVERY FOUR HOURS AS NEEDED FOR WHEEZING 18 Inhaler 0     Current Facility-Administered Medications   Medication Dose Route Frequency Provider Last Rate Last Dose     triamcinolone acetonide 40 mg/mL injection 40 mg (KENALOG-40)  40 mg Intra-articular Once Lisette Anand MD           Total Data Points: 4

## 2021-06-08 NOTE — PROGRESS NOTES
"Sonya Mosqueda is a 49 y.o. female who is being evaluated via a billable video visit.      The patient has been notified of following:     \"This video visit will be conducted via a call between you and your physician/provider. We have found that certain health care needs can be provided without the need for an in-person physical exam.  This service lets us provide the care you need with a video conversation.  If a prescription is necessary we can send it directly to your pharmacy.  If lab work is needed we can place an order for that and you can then stop by our lab to have the test done at a later time.    Video visits are billed at different rates depending on your insurance coverage. Please reach out to your insurance provider with any questions.    If during the course of the call the physician/provider feels a video visit is not appropriate, you will not be charged for this service.\"    Patient has given verbal consent to a Video visit? Yes    Patient would like to receive their AVS by AVS Preference: João.    Patient would like the video invitation sent by: Text to cell phone: [phone]    Will anyone else be joining your video visit? No    Video Start Time: 1:31 PM    Additional provider notes:     Assessment and Plan:     Obesity  49 y.o. female in clinic today to discuss treatment of the following conditions through radical diet change, lifestyle modification and weight loss:  1. Class 1 obesity due to excess calories with serious comorbidity and body mass index (BMI) of 34.0 to 34.9 in adult    2. Pure hypercholesterolemia    3. Snoring      This patient presents to reestablish herself in a comprehensive weight management program.  Currently, in the context of COVID-19 she is eating throughout the afternoon and evening.  The majority of her calories are coming from processed/refined carbohydrates.  Previously, she has been successful (at least temporarily) and eating foods that provide more satiety.  " We had a lengthy conversation regarding the types of foods that might help her to regulate her appetite.  I reviewed her recent cardiovascular work-up including her negative stress test from last fall.  There is no contraindication to resuming phentermine/topiramate.  These were prescribed as shown below.  Through her insurance, she has access to a wellness program that sounds to be very supportive and quite intense.  Encouraged her to take advantage of this benefit and to focus on a high protein/high fat/low carbohydrate meal plan.  We discussed working to improve processes in her home including efforts to reduce access to snack like foods.  Hopefully, as she gains success with nutritional restriction she will have more options for physical activity, time restriction eating strategies and caloric restriction eating strategies.    I have recommended the following interventions:    Medications Ordered   Medications     phentermine 15 MG capsule     Sig: Take 1 capsule (15 mg total) by mouth every morning.     Dispense:  30 capsule     Refill:  0     topiramate (TOPAMAX) 25 MG tablet     Sig: Take 1 tablet (25 mg total) by mouth 2 (two) times a day. (1 tablet/night for first 7 days.  Add AM dose if no side effects)     Dispense:  60 tablet     Refill:  1       This patient is a candidate for bariatric surgery (based on BMI or BMI + comorbidities).  This option was not discussed.    [place] for the follow-up appointment will include goals of therapy, definition of success and focus on physical activity/exercise plan.      Return to clinic in 4 weeks.        Continue supplements.    Recommend increasing movement throughout the day--sitting less, moving more.  Will increase activity over time to reach a goal of at least 150 minutes of moderate exercise each week.  Behavior modification:  Cognitive restructuring exercises, journaling stressors, triggers for food cravings.  Dietary Intervention:  Reduced calorie, reduced  "carbohydrates, whole food diet.  Greater than 50% of this 30 minute visit was spent in counseling regarding patient's obesity, medications, dietary, exercise, and behavior modification recommendations.    Subjective  Patient presents for treatment of chronic, comorbid conditions using intensive therapeutic lifestyle interventions including nutrition, physical activity, and behavior management.    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  She works with the [employer]   - weight has continued to yo-yo.  Work has been busy.  She had been traveling.  \"Bad choices\" while traveling.   - exercise is limited: she walks her dogs.    - she struggles with stiffness and lower extremity swelling (when seated during the day).  Psoriasis has been worse.  Psoriatic arthritis has been worse.    - she found some success with TR.  13-15 hours of fasting. She was using the [employer] regularly.  Life was calmer.    - the house is stocked with junk food.   - soda: yes.  2/day.  Black coffee in the morning.   - the majority of her calories are 4pm - 10pm.   - portions are large.  She snacks all night long.  Candy.   and adult daughter do not snack.   - she wants to lose weight in order to look good when she catherine in an  next year.    - previously she did not feel like she is on a diet.      Chest pain has completely subsided.  Stress test reviewed.  She has concluded that her symptoms with GERD    [facility] 5/8/2020   How has your weight changed over the last year?  Gained   How many pounds? 20   I have the following health issues associated with obesity: High Cholesterol, GERD (Reflux)   I have the following symptoms associated with obesity: Knee Pain, Lower Extremity Swelling, Back Pain, Fatigue       Patient goals reviewed with patient 5/8/2020   I am interested in having a healthier weight to diminish current health problems: Yes   I am interested in " "having a healthier weight in order to prevent future health problems: Yes   I am interested in having a healthier weight in order to have a future surgery: No   I have the following family history of obesity/being overweight:  My mother is overweight, One or more of my siblings are overweight, Many of my relatives are overweight   I have the following family history of obesity/being overweight:  My mother is overweight, One or more of my siblings are overweight, Many of my relatives are overweight       Referring Provider 5/8/2020   Please name the provider who referred you to Medical Weight Management.  If you do not know, please answer: \"I Don't Know\". Dr. Boyce        Weight History Reviewed With Patient 5/8/2020   How concerned are you about your weight? Somewhat Concerned   Would you describe your weight gain as gradual? Yes   I became overweight: As an Adult   The following factors have contributed to my weight gain:  Change in Schedule, Eating Wrong Types of Food, Eating Too Much, Lack of Exercise, Genetic (Runs in the Family), Stress, Other   Please list the other factors.  PSA   Please list the medications you have tried. phentermine and topimax   My lowest weight since age 18 was: 115   My highest weight since age 18 was: 225   The most weight I have ever lost was: (lbs) 70       Diet Recall Reviewed With Patient 5/8/2020   Do you typically eat breakfast? Yes   If you do eat breakfast, what types of food do you eat? eggs, toast, fruit, pancakes   Do you typically eat lunch? No   Do you typically eat supper? Yes   If you do eat supper, what types of food do you typically eat? pasta, potatoes, rice, chips protein and veggie or salad   Do you typically eat snacks? Yes   If you do snack, what types of food do you typically eat? sweets, crackers   Do you like vegetables?  Yes   Do you drink water?  Yes   How many glasses of juice do you drink in a typical day? 0   How many of glasses of milk do you drink in " a typical day? 0   If you do drink milk, what type? Skim   How many 8oz glasses of sugar containing drinks such as Adelso-Aid/sweet tea do you drink in a day? 0   How many cans/bottles of sugar pop/soda/tea/sports drinks do you drink in a day? 0   How many cans/bottles of sugar pop/soda/tea/sports drinks do you drink in a day? 0   How often do you have a drink of alcohol? Never       Eating Habits Reviewed With Patient 5/8/2020   Eats Starches Everyday   Generally, my meals include foods like these: fried meats, brats, burgers, french fries, pizza, cheese, chips, or ice cream. Almost Everyday   Eat fast food (like [employer], [employer], [employer]). Less Than Weekly   Buffet Less Than Weekly   Watches TV A Few Times a Week   Often skip meals, eat at random times, have no regular eating times. A Few Times a Week   Grazes Once a Week   Eat extra snacks between meals. Once a Week   Eat most of my food at the end of the day. Almost Everyday   Eats at Night Never   Eat extra snacks to prevent or correct low blood sugar. Never   Eat to prevent acid reflux or stomach pain. Never   Worry about not having enough food to eat. Never   Have you been to the food shelf at least a few times this year? No   I eat when I am depressed. Never   I eat when I am stressed. A Few Times a Week   I eat when I am bored. A Few Times a Week   I eat when I am anxious. Never   I eat when I am happy or as a reward. A Few Times a Week   I feel hungry all the time even if I just have eaten. Never   Feeling full is important to me. Never   I finish all the food on my plate even if I am already full. Everyday   I can't resist eating delicious food or walk past the good food/smell. Almost Everyday   I eat/snack without noticing that I am eating. Never   I eat when I am preparing the meal. Everyday   I eat more than usual when I see others eating. A Few Times a Week   I have trouble not eating sweets, ice cream, cookies, or chips if they are around the  house. Almost Everyday   I think about food all day. Never   What foods, if any, do you crave? Sweets / Candy / Chocolate, Chips / Crackers   Please list any other foods you crave. diet pop       Activity/Exercise History Reviewed With Patient 2020   How much of a typical 12 hour day do you spend sitting? Most of the Day   How much of a typical 12 hour day do you spend lying down? Less Than Half the Day   How much of a typical day do you spend walking/standing? Less Than Half the Day   How many hours (not including work) do you spend on the TV/Video Games/Computer/Tablet/Phone? 2-3 Hours   How many times a week are you active for the purpose of exercise? Once a Week   How many total minutes do you spend doing some activity for the purpose of exercising when you exercise? 15-30 Minutes   What keeps you from being more active? Pain, Lack of Time, Too Tired       PAST MEDICAL HISTORY:  Past Medical History:   Diagnosis Date     Anxiety disorder      Endometriosis     Created by Conversion  Replacement Utility updated for latest IMO load     Fibromyalgia      GERD (gastroesophageal reflux disease)      HLD (hyperlipidemia)      Nephrolithiasis     Created by Conversion      Spontaneous      Created by Conversion  Replacement Utility updated for latest IMO load       Work/Social History Reviewed With Patient 2020   My employment status is: Full-Time   My job is: [employer]    How much of your job is spent on the computer or phone? 100%   How many hours do you spend commuting to work daily?  0   What is your marital status?  / In a Relationship   If in a relationship, is your significant other overweight? No   Do you have children? Yes   If you have children, are they overweight? No   Who do you live with?   and 2 dogs   Are they supportive of your health goals? Yes   Who does the food shopping?  me       Mental Health History Reviewed With Patient 2020   Have  you ever been physically or sexually abused? No   If yes, do you feel that the abuse is affecting your weight? N/A   If yes, would you like to talk to a counselor about the abuse? N/A   How often in the past 2 weeks have you felt little interest or pleasure in doing things? For Several Days   Over the past 2 weeks how often have you felt down, depressed, or hopeless? Not at all       Sleep History Reviewed With Patient 5/8/2020   How many hours do you sleep at night? 7   Do you think that you snore loudly or has anybody ever heard you snore loudly (louder than talking or so loud it can be heard behind a shut door)? Yes   Has anyone seen or heard you stop breathing during your sleep? No   Do you often feel tired, fatigued, or sleepy during the day? Yes   Do you have a TV/Computer or other screens in your bedroom? Yes     No flowsheet data found.    Patient Active Problem List   Diagnosis     Asthma     Thoracic Outlet Syndrome     Nicotine Dependence - In Remission     Generalized Anxiety Disorder     Osteoarthritis Of The Knee     Vitamin D Deficiency     Pure hypercholesterolemia     Major Depression, Recurrent     Fibromyalgia     Obesity     Bulimia nervosa     Acid reflux     Venous insufficiency of both lower extremities     Symptomatic varicose veins of both lower extremities     Psoriatic arthritis (H)     Mediastinal adenopathy     Snoring       Current Outpatient Medications on File Prior to Visit   Medication Sig Dispense Refill     albuterol (VENTOLIN HFA) 90 mcg/actuation inhaler INHALE ONE OR TWO PUFFS BY MOUTH EVERY FOUR HOURS AS NEEDED FOR WHEEZING 18 Inhaler 2     ALPRAZolam (XANAX) 0.5 MG tablet Take 1 tablet (0.5 mg total) by mouth 3 (three) times a day as needed for anxiety. 30 tablet 0     beclomethasone (QVAR) 40 mcg/actuation inhaler Inhale 1 puff 2 (two) times a day. 1 Inhaler 12     biotin 5,000 mcg TbDL Take 1 tablet by mouth daily. 1 tablet daily       cholecalciferol, vitamin D3, (VITAMIN  D3) 50 mcg (2,000 unit) Tab Take 1 capsule by mouth daily.        dextrin (FIBER, DEXTRIN,) 3 gram/3.5 gram Powd Take 1 tablet by mouth daily.        diclofenac sodium (VOLTAREN) 1 % Gel APPLY 2-3 GRAMS 3-4 TIMES PER DAY AS NEEDED  0     DULoxetine (CYMBALTA) 20 MG capsule Take 1 capsule (20 mg total) by mouth 2 (two) times a day. 180 capsule 3     HYDROcodone-acetaminophen 5-325 mg per tablet Take 1-2 tablets by mouth every 4 (four) hours as needed for pain. 15 tablet 0     hydrOXYzine HCl (ATARAX) 25 MG tablet TAKE 1-2 TABLETS (25-50 MG TOTAL) BY MOUTH EVERY 6 (SIX) HOURS AS NEEDED (PAIN). 360 tablet 3     ibuprofen (ADVIL,MOTRIN) 200 MG tablet Take 600 mg by mouth every 6 (six) hours as needed for pain.        ketoconazole (NIZORAL) 2 % shampoo Lather on scalp, leave on for 5 minutes and rinse 120 mL 3     magnesium 200 mg Tab Take 1 tablet by mouth daily.       montelukast (SINGULAIR) 10 mg tablet TAKE 1 TABLET BY MOUTH EVERYDAY AT BEDTIME. 30 tablet 8     multivitamin (ONE A DAY) per tablet Take 1 tablet by mouth daily.       omega-3 fatty acids-vitamin E (FISH OIL) 1,000 mg cap Take 1 capsule by mouth daily.       omeprazole (PRILOSEC) 20 MG capsule Take 1 capsule (20 mg total) by mouth 2 (two) times a day before meals. 180 capsule 2     pravastatin (PRAVACHOL) 10 MG tablet Take 1 tablet (10 mg total) by mouth daily. 90 tablet 2     [DISCONTINUED] azelastine (ASTELIN) 137 mcg (0.1 %) nasal spray USE 1 SPRAY INTO EACH NOSTRIL 2 TIMES A DAY  6     [DISCONTINUED] estrogens, conjugated, (PREMARIN) 0.3 MG tablet Take 1 tablet (0.3 mg total) by mouth daily. 30 tablet 5     [DISCONTINUED] HUMIRA,CF, PEN 40 mg/0.4 mL PnKt        No current facility-administered medications on file prior to visit.        Objective:  There were no vitals filed for this visit.  Weight: (!) 225 lb (102.1 kg)    Body mass index is 35.77 kg/m .  No LMP recorded. Patient has had a hysterectomy.  GENERAL: healthy, alert and no distress  EYES:  Eyes grossly normal to inspection, conjunctivae and sclerae normal  HENT: normal cephalic/atraumatic.  External ears, nose and mouth without ulcers or lesions.  RESP: no audible wheeze, cough, or visible cyanosis.  No visible retractions or increased work of breathing.  Able to speak fully in complete sentences.  SKIN: no suspicious lesions or rashes  NEURO: Cranial nerves grossly intact, mentation intact and speech normal  PSYCH: mentation appears normal, affect normal/bright, judgement and insight intact, normal speech and appearance well-groomed    10/4/2019:  Echo:    The stress electrocardiogram is negative for inducible ischemic EKG changes.    1.The patient's exercise tolerance was lower limits of normal achieving 8.5 METS in standard Ezekiel protocol.    2.Adequate negative exercise ECG for ischemia after 7 minutes on the standard Ezekiel protocol.    3.Patient developed no symptoms during exercise and discontinued secondary to fatigue.    4.Left ventricle ejection fraction is normal. The estimated left ventricular ejection fraction is 55%. No major resting valvular abnormality.    5.Stress echocardiogram is negative for inducible ischemia.    6.When compared to the previous study dated 12/28/2010, no significant change.    This note has been dictated using voice recognition software. Any grammatical or context distortions are unintentional and inherent to the software    Video-Visit Details    Type of service:  Video Visit    Video End Time (time video stopped): 2:03 PM  Originating Location (pt. Location): Home    Distant Location (provider location):  [facility]/OB     Platform used for Video Visit: Benny Salinas MD

## 2021-06-09 NOTE — H&P
".Assessment/Plan:  Proceed with bronchoscopy as planned     Subjective:  Sonya Mosqueda is a 46 y.o. female here for bronchoscopy.  Found to have lung nodule and mediastinal lymphadenopathy on imaging.  No weight loss.  No hemoptysis.  No shortness of breath.  No chest pain.  No cough.      No personal history of cancer  Yes family history of cancer  Previous smoker.  1 ppd for 20 years.      Review of Systems  A 12 point comprehensive review of systems was negative except as noted.    Objective:    Patient Vitals for the past 8 hrs:   Temp Temp src Pulse Resp SpO2 Height Weight   02/21/17 0903 98.6  F (37  C) Oral 78 18 99 % 5' 7\" (1.702 m) 211 lb 12.8 oz (96.1 kg)         General Appearance:    Alert, cooperative, no distress, appears stated age   Head:    Normocephalic, without obvious abnormality, atraumatic   Eyes:    PERRL, conjunctiva/corneas clear, EOM's intact, fundi     benign, both eyes   Ears:    Normal TM's and external ear canals, both ears   Nose:   Nares normal, septum midline, mucosa normal, no drainage    or sinus tenderness   Throat:   Lips, mucosa, and tongue normal; teeth and gums normal   Neck:   Supple, symmetrical, trachea midline, no adenopathy;     thyroid:  no enlargement/tenderness/nodules; no carotid    bruit or JVD   Back:     Symmetric, no curvature, ROM normal, no CVA tenderness   Lungs:     Clear to auscultation bilaterally, respirations unlabored   Chest Wall:    No tenderness or deformity    Heart:    Regular rate and rhythm, S1 and S2 normal, no murmur, rub   or gallop   Abdomen:     Soft, non-tender, bowel sounds active all four quadrants,     no masses, no organomegaly   Extremities:   Extremities normal, atraumatic, no cyanosis or edema   Pulses:   2+ and symmetric all extremities   Skin:   Skin color, texture, turgor normal, no rashes or lesions   Lymph nodes:   Cervical, supraclavicular, and axillary nodes normal   Neurologic:   CNII-XII intact, normal strength, sensation and " reflexes     throughout       Data Review  CBC:   Lab Results   Component Value Date    WBC 5.8 12/22/2016    RBC 4.88 12/22/2016     BMP:   Lab Results   Component Value Date    CO2 24 12/22/2016    BUN 11 12/22/2016    CREATININE 0.58 (L) 12/22/2016    CALCIUM 9.1 12/22/2016       Ct Chest Without Contrast    Result Date: 2/13/2017  CT CHEST WO CONTRAST 2/13/2017 6:11 PM INDICATION: Pulmonary nodule. TECHNIQUE: Routine chest. Dose reduction techniques were used. IV CONTRAST: None. COMPARISON: 12/28/2016. FINDINGS: LUNGS AND PLEURA: No significant interval change in the 1.5 cm peripheral pulmonary nodule superior segment left lower lobe with smaller adjacent satellite nodules, not significantly changed. No acute infiltrate, pleural effusion, pulmonary edema or pneumothorax. Linear strands of scarring in both inferior lung bases. MEDIASTINUM: No significant change in left hilar lymphadenopathy. Small paratracheal and AP window and prevascular lymph nodes are not significantly changed. Homogeneous thyroid gland. No supraclavicular or axillary adenopathy seen. Course and caliber of  the great vessels in the mediastinum appear normal. Heart size normal. No pericardial thickening or effusion. No coronary arterial calcifications. LIMITED UPPER ABDOMEN: Negative. MUSCULOSKELETAL: Negative acute. Degenerative change in the thoracic spine.     CONCLUSION: No significant interval change in the 1.5 cm peripheral pulmonary nodule superior segment left lower lobe with adjacent smaller satellite nodules and left hilar lymphadenopathy. Findings concerning for neoplasm given the persistence. Infectious/inflammatory etiology is an alternative possibility. NOTE: ABNORMAL REPORT THE DICTATION ABOVE DESCRIBES AN ABNORMALITY FOR WHICH FOLLOW-UP IS NEEDED.     Nm Pet Ct Skull To Mid Thigh    Result Date: 2/15/2017  PET FDG/CT 2/15/2017 3:24 PM INDICATION: Pulmonary nodule TECHNIQUE: Serum glucose level 77 mg/dL. One hour post left  antecubital intravenous administration of 8.9 mCi F-18 FDG, PET imaging was performed from the skull base to the mid thighs utilizing attenuation correction with concurrent axial CT and PET/CT image fusion. Dose reduction techniques were used. COMPARISON: CT from 02/13/2017 is reviewed. FINDINGS: HEAD AND NECK: Negative. CHEST: 1.5 x 0.9 cm pulmonary nodule in the superior segment left lower lobe is anatomically unchanged in the short interval since 02/13/2017 and mildly FDG avid (SUVmax 3.7). FDG avid left interlobar station 11L, left hilar station 10L, subcarinal station 7, and lower left paratracheal station 4L lymphadenopathy. The station 7 node, as an example, measures 1.6 x 0.9 cm (SUVmax 3.1). ABDOMEN/PELVIS: No abnormal FDG activity. Physiologic FDG activity in the ovaries. Negative adrenals. Pelvic phleboliths. MUSCULOSKELETAL: Moderate degenerative change in the cervical spine and lumbosacral interspace.     CONCLUSION: Findings suspicious for left lower lobe lung cancer with left hilar and mediastinal lymph node metastases. Recommend endobronchial ultrasound-guided lymph node biopsy.    Fercho Henderson MD  Pulmonary and Critical Care Medicine  [phone]

## 2021-06-09 NOTE — PROGRESS NOTES
"S: pt in pacu recovering     O:  Visit Vitals     /69     Pulse 86     Temp 98.5  F (36.9  C) (Oral)     Resp 13     Ht 5' 7\" (1.702 m)     Wt 211 lb 12.8 oz (96.1 kg)     SpO2 97%     BMI 33.17 kg/m2          A: pt arrived to bronch suite , placed on 2l nc, administered 5ml of 4% lidocaine via svn. Administered 10ml viscous lidocaine to back of throat and instructed pt to gargle and spit. Sedation meds were given. Pt was unable to become sedated enough to preform the procedure in bronchi suite. ebus was preformed in OR. Author assisted with pulmonary MD EBUS procedure. No sample were sent to cytology.     P: pt continue to be monitored in PACU   "

## 2021-06-09 NOTE — PROGRESS NOTES
"Sonya Mosuqeda is a 49 y.o. female who is being evaluated via a billable video visit.      The patient has been notified of following:     \"This video visit will be conducted via a call between you and your physician/provider. We have found that certain health care needs can be provided without the need for an in-person physical exam.  This service lets us provide the care you need with a video conversation.  If a prescription is necessary we can send it directly to your pharmacy.  If lab work is needed we can place an order for that and you can then stop by our lab to have the test done at a later time.    Video visits are billed at different rates depending on your insurance coverage. Please reach out to your insurance provider with any questions.    If during the course of the call the physician/provider feels a video visit is not appropriate, you will not be charged for this service.\"    Patient has given verbal consent to a Video visit? Yes  How would you like to obtain your AVS? AVS Preference: [employer].  Patient would like the video invitation sent by: Text to cell phone: [phone]   Will anyone else be joining your video visit? No    Video Start Time: 8:00 AM    Additional provider notes:     Assessment and Plan:     Obesity  49 y.o. year old female in clinic today to discuss treatment of the following conditions through diet and lifestyle modification and weight loss:  1. Class 1 obesity due to excess calories with serious comorbidity and body mass index (BMI) of 33.0 to 33.9 in adult      The patient's weight loss result since the last visit was successful based on weight loss and improvement in nutrition.   is a partner.  She has been adding variety.  Meals are mostly keto style eating.     - continue phentermine.     - continue to add variety.   - return to clinic in 6 weeks.     Continue supplements.    Recommend increasing movement throughout the day--sitting less, moving more.  Will increase " "activity over time to reach a goal of at least 150 minutes of moderate exercise each week.  Behavior modification:  Cognitive restructuring exercises, journaling stressors, triggers for food cravings.  Dietary Intervention:  Reduced calorie, reduced carbohydrates, whole food diet.  Greater than 50% of this 15 minute visit was spent in counseling regarding patient's obesity, medications, dietary, exercise, and behavior modification recommendations.    Subjective  Patient presents for treatment of chronic, comorbid conditions using intensive therapeutic lifestyle interventions including nutrition, physical activity, and behavior management.   - successes: she continues to avoid carbs for the most part.  She feels unwell when she eats foods like sandwiches.  She is \"not missing [sweets] as much.\"  She does not feel like she needs to replace sweets with \"fake sweets.\"  She says that she feels like she can keep this plan up.  Grocery bill is less this year. Less trips to stores.  Ups and downs have subsided.  No shakiness   - struggles: getting enough movement.  Job is sedentary.     - tracking/journaling: ad tyrone   - following nutritional plan: yes.  Deviations from plan: popcorn.   - hunger: controlled   - medication: benefits: helpful. side effects: none   - sleep: \"good\"   - TR: 14 hour fasts (late breakfast).  When she breaks her fast it \"feels good to eat.\"    No flowsheet data found.    Patient Active Problem List   Diagnosis     Asthma     Thoracic Outlet Syndrome     Nicotine Dependence - In Remission     Generalized Anxiety Disorder     Osteoarthritis Of The Knee     Vitamin D Deficiency     Pure hypercholesterolemia     Major Depression, Recurrent     Fibromyalgia     Obesity     Bulimia nervosa     Acid reflux     Venous insufficiency of both lower extremities     Symptomatic varicose veins of both lower extremities     Psoriatic arthritis (H)     Mediastinal adenopathy     Snoring       Current Outpatient " Medications on File Prior to Visit   Medication Sig Dispense Refill     albuterol (VENTOLIN HFA) 90 mcg/actuation inhaler INHALE ONE OR TWO PUFFS BY MOUTH EVERY FOUR HOURS AS NEEDED FOR WHEEZING 18 Inhaler 2     ALPRAZolam (XANAX) 0.5 MG tablet Take 1 tablet (0.5 mg total) by mouth 3 (three) times a day as needed for anxiety. 30 tablet 0     beclomethasone (QVAR) 40 mcg/actuation inhaler Inhale 1 puff 2 (two) times a day. 1 Inhaler 6     biotin 5,000 mcg TbDL Take 1 tablet by mouth daily. 1 tablet daily       cholecalciferol, vitamin D3, (VITAMIN D3) 50 mcg (2,000 unit) Tab Take 1 capsule by mouth daily.        dextrin (FIBER, DEXTRIN,) 3 gram/3.5 gram Powd Take 1 tablet by mouth daily.        diclofenac sodium (VOLTAREN) 1 % Gel APPLY 2-3 GRAMS 3-4 TIMES PER DAY AS NEEDED  0     DULoxetine (CYMBALTA) 20 MG capsule Take 1 capsule (20 mg total) by mouth 2 (two) times a day. 180 capsule 1     HYDROcodone-acetaminophen 5-325 mg per tablet Take 1-2 tablets by mouth every 4 (four) hours as needed for pain. 15 tablet 0     hydrOXYzine HCl (ATARAX) 25 MG tablet TAKE 1-2 TABLETS (25-50 MG TOTAL) BY MOUTH EVERY 6 (SIX) HOURS AS NEEDED (PAIN). 360 tablet 3     ibuprofen (ADVIL,MOTRIN) 200 MG tablet Take 600 mg by mouth every 6 (six) hours as needed for pain.        ketoconazole (NIZORAL) 2 % shampoo Lather on scalp, leave on for 5 minutes and rinse 120 mL 3     magnesium 200 mg Tab Take 1 tablet by mouth daily.       montelukast (SINGULAIR) 10 mg tablet Take one daily 30 tablet 8     multivitamin (ONE A DAY) per tablet Take 1 tablet by mouth daily.       omega-3 fatty acids-vitamin E (FISH OIL) 1,000 mg cap Take 1 capsule by mouth daily.       omeprazole (PRILOSEC) 20 MG capsule Take 1 capsule (20 mg total) by mouth 2 (two) times a day before meals. (Patient taking differently: Take 20 mg by mouth once. ) 180 capsule 2     phentermine 15 MG capsule Take 1 capsule (15 mg total) by mouth every morning. 90 capsule 0      pravastatin (PRAVACHOL) 10 MG tablet Take 1 tablet (10 mg total) by mouth daily. 90 tablet 1     topiramate (TOPAMAX) 25 MG tablet Take 1 tablet (25 mg total) by mouth 2 (two) times a day. 180 tablet 1     No current facility-administered medications on file prior to visit.        Objective:  There were no vitals filed for this visit.  Initial Weight: 225 lbs  Weight: 212 lb 8 oz (96.4 kg)  Weight loss from initial: 12.5  % Weight loss: 5.56 %    Body mass index is 33.78 kg/m .  No LMP recorded. Patient has had a hysterectomy.  GENERAL: Healthy, alert and no distress  EYES: Eyes grossly normal to inspection. No discharge or erythema, or obvious scleral/conjunctival abnormalities.  RESP: No audible wheeze, cough, or visible cyanosis.  No visible retractions or increased work of breathing.    SKIN: Visible skin clear. No significant rash, abnormal pigmentation or lesions.  NEURO: Cranial nerves grossly intact. Mentation and speech appropriate for age.  PSYCH: Mentation appears normal, affect normal/bright, judgement and insight intact, normal speech and appearance well-groomed    This note has been dictated using voice recognition software. Any grammatical or context distortions are unintentional and inherent to the software      Video-Visit Details    Type of service:  Video Visit    Video End Time (time video stopped): 8:17 AM  Originating Location (pt. Location): Home    Distant Location (provider location):  [facility]/OB     Platform used for Video Visit: Allan

## 2021-06-09 NOTE — PROGRESS NOTES
Assessment & Plan:  1. Herpes zoster without complication  Patient with a rash that's consistent with shingles.  Unsure how long it has been there but it does cause some burning.  Unsure if any benefit of Valtrex at this time but will give it to her to take to see if we can decrease any burning or symptoms that she is having.    2. Lung nodule  Patient is very concerned about the lung nodule and possibility of cancer.  We reviewed her PET scan and no other lesions were noted.  We also discussed that her medical history smoking puts her at low risk.  She needs to take one day at a time and see how things go tomorrow.  The medications that she is not taking such as her ibuprofen and supplements that would increase her risk of bleeding.  Should her on taking her anxiety medicine before going to the procedure.  Patient has support of her  through this.  Patient is planning a second opinion through the [facility].      No orders of the defined types were placed in this encounter.    Medications Discontinued During This Encounter   Medication Reason     APREMILAST (OTEZLA ORAL) Therapy completed       No Follow-up on file.      Chief Complaint:   Chief Complaint   Patient presents with     Rash     C/O LLQ skin rash, small red circular bumps, pustule type lesions per Pt; onset apx 02/16/17     Rash     Possible parasthesia per Pt; minimal pain per Pt; no noted itching,      Rash     Using cream from friend       History of Present Illness:  Sonya is a 46 y.o. female presenting to the clinic today for a rash on her lower left quadrant. She believes that it is a flare up of shingles. She does not recall when it started, but notes that it began itching and burning. She has been using a cream that her friend gave her; she does not know the name of the cream, but believes that it has helped. She has valtrex but is not taking it. She notes that the rash is uncomfortable and irritating, and is painful to  "scraping and pressure. She denies weeping of the rash. She has been stressed lately.     Review of Systems:  She had a PET scan done for eval of a lung nodule and was found to have a suspicion of lower lobe lung cancer. She is having a tissue biopsy tomorrow and she is following up with the [facility] for a second opinion. She has not smoked for many years. She has been extremely stressed out and uneasy. All other systems are negative. No other lesions were noted in the PET scan.     PFSH:    Tobacco Use:  History   Smoking Status     Former Smoker     Packs/day: 1.00     Years: 20.00   Smokeless Tobacco     Not on file     Comment: quit 2006       Vitals:  Vitals:    02/20/17 1542   BP: 122/64   Pulse: 84   Resp: 16   Weight: 212 lb 8 oz (96.4 kg)   Height: 5' 7\" (1.702 m)     Wt Readings from Last 3 Encounters:   02/20/17 212 lb 8 oz (96.4 kg)   02/15/17 205 lb (93 kg)   02/09/17 215 lb 5 oz (97.7 kg)     Body mass index is 33.28 kg/(m^2).      Physical Exam:  Constitutional:  Reveals an alert, cooperative, pleasant female in no acute distress.  Vitals:  Per nursing notes.  Skin:  LLQ - mutiple erythematous vesicles.   Neurologic:  No gross focal deficits.    Psychiatric:  Mood appropriate, memory intact.     Data Reviewed:  Additional history summarized (from old records or history from someone other than the patient or another healthcare provider) (2 TOTAL): Reviewed note from 02/01/16 regarding mammogram. Reviewed note from pulmonology regarding plan for nodule.     Decision to obtain extra information (old records requested or history from another person or accessing Care Everywhere) (1 TOTAL): None.     Radiology tests summarized or ordered (XRAY/CT/MRI/DXA) (1 TOTAL): 1 - PET scan.    Labs reviewed or ordered (1 TOTAL): None.    Medicine tests summarized or ordered (EKG/ECHO) (1 TOTAL): None.    Independent review of EKG or X-Ray (2 EACH): None.       The visit lasted a total of 20 minutes face to face with the " patient. Over 50% of the time was spent counseling and educating the patient about coordination of care.    I, Maria Guadalupe Donahue, am scribing for and in the presence of, Dr. Soler.    I, Dr. Soler, personally performed the services described in this documentation, as scribed by Maria Guadalupe Donahue in my presence, and it is both accurate and complete.    Medications:  Current Outpatient Prescriptions   Medication Sig Dispense Refill     ALPRAZolam (XANAX) 0.5 MG tablet Take 1 tablet (0.5 mg total) by mouth 2 (two) times a day as needed for anxiety. 14 tablet 0     beclomethasone (QVAR) 40 mcg/actuation inhaler Inhale 1 puff 2 (two) times a day.       BIOTIN ORAL Take by mouth.       CHOLECALCIFEROL, VITAMIN D3, (VITAMIN D3 ORAL) Take 1 capsule by mouth daily.       COLLAGEN MISC Use As Directed.       DULoxetine (CYMBALTA) 20 MG capsule TAKE 1 CAPSULE (20 MG TOTAL) BY MOUTH 2 (TWO) TIMES A DAY. 180 capsule 3     FIBER, DEXTRIN, ORAL Take 1 tablet by mouth daily.       fluticasone-salmeterol (ADVAIR) 100-50 mcg/dose DISKUS Inhale 1 puff 2 (two) times a day.       HYALURONATE SODIUM (HYALURONIC ACID, SODIUM, ORAL) Take by mouth.       HYDROcodone-acetaminophen 5-325 mg per tablet Take 1-2 tablets by mouth every 4 (four) hours as needed for pain. 15 tablet 0     hydrOXYzine (VISTARIL) 25 MG capsule TAKE 1-2 CAPSULES BY MOUTH EVERY 4-6 HOURS AS NEEDED FOR PAIN 40 capsule 3     ibuprofen (ADVIL,MOTRIN) 200 MG tablet Take 600 mg by mouth every 6 (six) hours as needed for pain.        IODINE ORAL Take by mouth.       LACTOBACILLUS ACIDOPHILUS (ACIDOPHILUS ORAL) Take 1 capsule by mouth daily.       magnesium 200 mg Tab Take 1 tablet by mouth daily.       METHYLSULFONYLMETHANE (MSM ORAL) Take by mouth.       multivitamin (ONE A DAY) per tablet Take 1 tablet by mouth daily.       omega-3 fatty acids-vitamin E (FISH OIL) 1,000 mg cap Take 1 capsule by mouth daily.       omeprazole (PRILOSEC) 20 MG capsule TAKE ONE CAPSULE BY MOUTH ONE  TIME DAILY 90 capsule 1     pravastatin (PRAVACHOL) 10 MG tablet TAKE ONE TABLET BY MOUTH ONE TIME DAILY 90 tablet 0     SILICON DIOXIDE (SILICA GEL MISC) Use As Directed.       TURMERIC, BULK, MISC Use As Directed.       VENTOLIN HFA 90 mcg/actuation inhaler INHALE ONE OR TWO PUFFS BY MOUTH EVERY FOUR HOURS AS NEEDED FOR WHEEZING 18 Inhaler 0     ZINC ORAL Take by mouth.       ALPRAZolam (XANAX) 0.5 MG tablet TAKE ONE TABLET BY MOUTH THREE TIMES A DAY AS NEEDED FOR ANXIETY 30 tablet 0     meloxicam (MOBIC) 15 MG tablet Take 1 tablet (15 mg total) by mouth daily. 30 tablet 3     valACYclovir (VALTREX) 1000 MG tablet Take 1 tablet (1,000 mg total) by mouth 3 (three) times a day for 7 days. 21 tablet 0     Current Facility-Administered Medications   Medication Dose Route Frequency Provider Last Rate Last Dose     triamcinolone acetonide 40 mg/mL injection 40 mg (KENALOG-40)  40 mg Intra-articular Once Lisette Anand MD           Total Data Points: 3

## 2021-06-09 NOTE — PROGRESS NOTES
Attempted bronchoscopy this morning.  Had given patient 8 mg versed, 150 mcg fentanyl, and 50 mg benadryl, but patient continued to remain awake, alert, interactive, and carrying on a conversation with staff in the room.  IV functioning appropriately.  Given the high doses likely needed in order to safely perform procedure, will abort procedure in bronch suite, and instead perform in OR.    Fercho Henderson MD  Pulmonary and Critical Care Medicine  [phone]

## 2021-06-09 NOTE — SEDATION DOCUMENTATION
Sedation total:  Versed 8 mg IV, Fentanyl 150 mcg IV, Benadryl 50 mg IV. Patient still awake, alert, speech clear. Decision made to abort procedure and do in the Operating Suite under anesthesia.

## 2021-06-09 NOTE — TELEPHONE ENCOUNTER
Unable to Leave message to call back for:  Sonya (Voice Mail full)  Information to relay to patient:  Pt is on the schedule at  on 7/10/20 for a Weight Loss F/U Video Visit at 2pm with Dr. Marquez (should be Dr. Nico Sands).   Please reschedule Video Visit with Dr. Nico Sands.   No

## 2021-06-09 NOTE — ANESTHESIA PREPROCEDURE EVALUATION
Anesthesia Evaluation      Patient summary reviewed   No history of anesthetic complications     Airway   Mallampati: III  Neck ROM: full   Pulmonary - normal exam    breath sounds clear to auscultation  (+) asthma  a smoker    ROS comment: Vocal cord polyp                         Cardiovascular - normal exam  Exercise tolerance: > or = 4 METS  (+) , hypercholesterolemia,     Rhythm: regular  Rate: normal,         Neuro/Psych    (+) neuromuscular disease,  depression, anxiety/panic attacks, chronic pain    Comments: ptsd    Endo/Other    (+) arthritis, obesity,      GI/Hepatic/Renal    (+) GERD,   chronic renal disease,      Other findings: 2016 nm stress negative      Dental    (+) poor dentition and chipped                       Anesthesia Plan  Planned anesthetic: general endotracheal and total IV anesthesia  -- Esmolol drawn up and ready for administration  -- PONV prophylaxis with Decadron 10 mg and Zofran 4 mg  -- Equal volume reversal of relaxant (esmolol for iatrogenic tachycardia)    ASA 3   Induction: intravenous   Anesthetic plan and risks discussed with: patient  Anesthesia plan special considerations: rapid sequence induction, antiemetics,   Post-op plan: routine recovery

## 2021-06-09 NOTE — PROCEDURES
Procedure: Bronchoscopy and EBUS   Indication: Mediastinal Lymphadenopathy and Abnormal CT   Providers: Fercho Henderson MD  Medications: Done in OR under general anesthesia.      Time Out:  Performed    The patient's medical record has been reviewed.  The necessary history and physical examination was performed.  The risks, benefits and alternatives of the procedure were discussed with the the patient in detail and she had the opportunity to ask questions.  All questions were answered and verbal and written informed consent was obtained.  The proposed procedure and the patient's identification were verified prior to the procedure.    The patient was assessed for the adequacy for the procedure and to receive medications.   Mental Status:  Alert and Oriented *3  Airway examination: II (hard and soft palate, upper portion of tonsils anduvula visible)  Pulmonary:  appears well, vitals normal, no respiratory distress, acyanotic, normal RR, ear and throat exam is normal, neck free of mass or lymphadenopathy, chest clear, no wheezing, crepitations, rhonchi, normal symmetric air entry  CV:  regular rate and rhythm, S1, S2 normal, no murmur, click, rub or gallop  ASA Grade: ASA 2 - Patient with mild systemic disease with no functional limitations    After clinical evaluation and reviewing the indication, risks, alternatives and benefits of the procedure the patient was deemed to be in satisfactory condition to undergo the procedure.      Immediately before administration of medications the patient was re-assessed for adequacy to receive sedatives including the heart rate, respiratory rate, mental status, oxygen saturation, blood pressure and adequacy of pulmonary ventilation. These same parameters were continuously monitored throughout the procedure.     Maneuvers / Procedure:   The bronchoscope was introduced via ETT. Complete airway examination performed from the trachea to the subsegmental level in each lobe of both lungs. There  was no endobronchial lesion, there were no secretions, and the mucosa was Normal appearing.      Using EBUS scope, ultrasound evaluation was performed of the mediastinal lymph nodes.  Station 4L, 7, 10L and 11L were enlarged.  Station 11L  had a distinct border and was heterogeneous.  FNA performed of 11L several times.  Station 4L, 7, and 10L were extensively evaluated.  No obvious lymphadenopathy.  FNA x 1 done of station 7.      Bronchoscope was then retracted and procedure terminated.  The patient tolerated the procedure well without undue discomfort, hypotension or arrhythmia, or hypoxia.    Complications: None    The case is discussed with patient's family after the procedure.     Fercho Henderson MD  Pager [phone]

## 2021-06-09 NOTE — ANESTHESIA POSTPROCEDURE EVALUATION
Patient: Sonya Mosqueda  BRONCHOSCOPY, WITH ENDOBRONCHIAL ULTRASOUND AND BIOPSY  Anesthesia type: general    Patient location: PACU  Last vitals:   Vitals:    02/21/17 1630   BP: 130/69   Pulse: 86   Resp: 13   Temp:    SpO2: 97%     Post vital signs: stable  Level of consciousness: awake and responds to simple questions  Post-anesthesia pain: pain controlled  Post-anesthesia nausea and vomiting: no  Pulmonary: unassisted, return to baseline  Cardiovascular: stable and blood pressure at baseline  Hydration: adequate  Anesthetic events: no    QCDR Measures:  ASA# 11 - Li-op Cardiac Arrest: ASA11B - Patient did NOT experience unanticipated cardiac arrest  ASA# 12 - Li-op Mortality Rate: ASA12B - Patient did NOT die  ASA# 13 - PACU Re-Intubation Rate: ASA13B - Patient did NOT require a new airway mgmt  ASA# 10 - Composite Anes Safety: ASA10A - No serious adverse event  ASA# 38 - New Corneal Injury: ASA38A - No new exposure keratitis or corneal abrasion in PACU    Additional Notes:

## 2021-06-09 NOTE — TELEPHONE ENCOUNTER
Prescription change request from [employer].  Qvar 40 mcg/actuation aerosol inhaler has been discontinued.      Requesting rx be changed to Qvar Redihaler AER 40 mcg.    Med t'd up please auth if appropriate.

## 2021-06-09 NOTE — ANESTHESIA CARE TRANSFER NOTE
Last vitals:   Vitals:    02/21/17 1609   BP: 133/83   Pulse: 86   Resp: 12   Temp: 36.8  C (98.3  F)   SpO2: 97%     Patient's level of consciousness is drowsy  Spontaneous respirations: yes  Maintains airway independently: yes  Dentition unchanged: yes  Oropharynx: oropharynx clear of all foreign objects    QCDR Measures:  ASA# 20 - Surgical Safety Checklist: ASA20A - Safety Checks Done  PQRS# 430 - Adult PONV Prevention: 4558F - Pt received => 2 anti-emetic agents (different classes) preop & intraop  ASA# 8 - Peds PONV Prevention: NA - Not pediatric patient, not GA or 2 or more risk factors NOT present  PQRS# 424 - Li-op Temp Management: 4559F - At least one body temp DOCUMENTED => 35.5C or 95.9F within required timeframe  PQRS# 426 - PACU Transfer Protocol: - Transfer of care checklist used  ASA# 14 - Acute Post-op Pain: ASA14B - Patient did NOT experience pain >= 7 out of 10    I completed my SBAR handoff to the receiving nurse per policy and procedure.

## 2021-06-10 ENCOUNTER — COMMUNICATION - HEALTHEAST (OUTPATIENT)
Dept: FAMILY MEDICINE | Facility: CLINIC | Age: 51
End: 2021-06-10

## 2021-06-10 DIAGNOSIS — E66.01 SEVERE OBESITY (BMI 35.0-39.9) WITH COMORBIDITY (H): ICD-10-CM

## 2021-06-10 NOTE — TELEPHONE ENCOUNTER
RN cannot approve Refill Request    RN can NOT refill this medication med is not covered by policy/route to provider. Last office visit: 12/30/2019 Lamar Mejía MD Last Physical: Visit date not found Last MTM visit: Visit date not found Last visit same specialty: 12/30/2019 Lamar Mejía MD.  Next visit within 3 mo: Visit date not found  Next physical within 3 mo: Visit date not found      Chitra Marte, [facility] Triage/Med Refill 8/28/2020    Requested Prescriptions   Pending Prescriptions Disp Refills     QVAR REDIHALER 40 mcg/actuation HFAB inhaler [Pharmacy Med Name: QVAR  40MCG  INH  REDIHALER] 21.2 g 3     Sig: USE 1 INHALATION BY MOUTH  TWICE DAILY       There is no refill protocol information for this order

## 2021-06-10 NOTE — PROGRESS NOTES
Assessment & Plan:  1. Acute non-recurrent sinusitis, unspecified location  doxycycline (MONODOX) 100 MG capsule         Patient Instructions   Follow up with us if symptoms are not improving after the course of antibiotics is complete.    Take antibiotics with food.    Increase water intake.    Saline spray in both nostrils for moisture and to thin mucus.    Use a humidifier at home to moisten the air.        No orders of the defined types were placed in this encounter.    There are no discontinued medications.        Chief Complaint:   Chief Complaint   Patient presents with     Sinusitis     Headache       History of Present Illness:  Sonya is a 46 y.o. female presenting to the clinic today with a week to 10 days of cold symptoms and sinus pressure.  She has had some occasional dizziness.  She feels this is related to headaches and sinus pressure.  No fevers or chills.  Her ears feel congested and she has some postnasal drip.  She is not having any cough currently.  She does mention a recent muscle spasm in the left occipital area when she is working with her , this has resolved but she is wondering if it could be related to sinusitis.  She does have a history of some chronic sinusitis and has seen ENT in the past.  She is wondering if she might need prednisone for this infection.Treatments and mentions a recent lung nodule that was found on CT scan and followed up with PET scan.  PET scan was clear but subsequent bronchoscopy was performed at the end of February.  She does not want to take any unnecessary medications or anything that could affect her immune system until they figure out if nodules are malignant.  It appears in chart review that the first bronchoscopy show no malignancy but there is another area they were discussing sampling that has not been done yet.    Review of Systems:  All other systems are negative except as noted above.     PFSH:  Reviewed and updated.     Tobacco Use:  History    Smoking Status     Former Smoker     Packs/day: 1.00     Years: 20.00   Smokeless Tobacco     Not on file     Comment: quit 2006       Vitals:  Vitals:    04/19/17 1535   BP: 120/72   Patient Site: Left Arm   Patient Position: Sitting   Cuff Size: Adult Large   Resp: 16   Temp: 98.8  F (37.1  C)   TempSrc: Oral   Weight: (!) 222 lb 5 oz (100.8 kg)     Wt Readings from Last 3 Encounters:   04/19/17 (!) 222 lb 5 oz (100.8 kg)   02/21/17 211 lb 12.8 oz (96.1 kg)   02/20/17 212 lb 8 oz (96.4 kg)       Physical Exam:  Constitutional:  Reveals an alert, cooperative, 46 year old female in no acute distress.  Vitals:  Per nursing notes.  Eyes: PERRLA, funduscopic exam within normal limits.  HENT: TMs clear bilaterally,Oropharynx with erythema, clear posterior nasal drainage, no thrush. Neck supple,  thyroid not palpable. Nasal mucosa erythematous, inflamed with increased rhinorrhea. Sinuses tender to palpation.   Cardiovascular:  Regular rate and rhythm without murmurs, rubs, or gallops. Carotids without bruits. Legs without edema.   Respiratory: Clear.  Respiratory effort normal.  Lymph: Anterior cervical lymphadenopathy  present, Posterior cervical lymphadenopathy not present, lymph nodes non- tender to palpation.   Psychiatric:  Mood appropriate, alert and oriented x3.    Data Reviewed:  Additional History from Old Records or Another Person Summarized (2 total): None.     Decision to Obtain Extra information (1 total): None.     Radiology Tests Summarized and Ordered (XRAY/CT/MRI/DXA) (1 total): None.    Labs Reviewed and Ordered (1 total):none    Medicine Tests Summarized and Ordered (EKG/ECHO/COLONOSCOPY/EGD) (1 total): None.    Independent Review of EKG or X-Ray (2 each): None.    The visit lasted a total of 20 minutes face to face with the patient. Over 50% of the time was spent counseling and educating the patient about plan of care.    Medications:  Current Outpatient Prescriptions   Medication Sig Dispense  Refill     albuterol (VENTOLIN HFA) 90 mcg/actuation inhaler INHALE ONE OR TWO PUFFS BY MOUTH EVERY FOUR HOURS AS NEEDED FOR WHEEZING 1 Inhaler 0     ALPRAZolam (XANAX) 0.5 MG tablet TAKE ONE TABLET BY MOUTH THREE TIMES A DAY AS NEEDED FOR ANXIETY 30 tablet 0     beclomethasone (QVAR) 40 mcg/actuation inhaler Inhale 1 puff 2 (two) times a day.       BIOTIN ORAL Take by mouth.       CHOLECALCIFEROL, VITAMIN D3, (VITAMIN D3 ORAL) Take 1 capsule by mouth daily.       COLLAGEN MISC Use As Directed.       DULoxetine (CYMBALTA) 20 MG capsule TAKE 1 CAPSULE (20 MG TOTAL) BY MOUTH 2 (TWO) TIMES A DAY. 180 capsule 3     FIBER, DEXTRIN, ORAL Take 1 tablet by mouth daily.       fluticasone-salmeterol (ADVAIR) 100-50 mcg/dose DISKUS Inhale 1 puff 2 (two) times a day.       HYALURONATE SODIUM (HYALURONIC ACID, SODIUM, ORAL) Take by mouth.       HYDROcodone-acetaminophen 5-325 mg per tablet Take 1-2 tablets by mouth every 4 (four) hours as needed for pain. 15 tablet 0     ibuprofen (ADVIL,MOTRIN) 200 MG tablet Take 600 mg by mouth every 6 (six) hours as needed for pain.        IODINE ORAL Take by mouth.       LACTOBACILLUS ACIDOPHILUS (ACIDOPHILUS ORAL) Take 1 capsule by mouth daily.       magnesium 200 mg Tab Take 1 tablet by mouth daily.       meloxicam (MOBIC) 15 MG tablet Take 1 tablet (15 mg total) by mouth daily. 30 tablet 3     METHYLSULFONYLMETHANE (MSM ORAL) Take by mouth.       multivitamin (ONE A DAY) per tablet Take 1 tablet by mouth daily.       omega-3 fatty acids-vitamin E (FISH OIL) 1,000 mg cap Take 1 capsule by mouth daily.       omeprazole (PRILOSEC) 20 MG capsule TAKE ONE CAPSULE BY MOUTH ONE TIME DAILY 90 capsule 1     pravastatin (PRAVACHOL) 10 MG tablet TAKE ONE TABLET BY MOUTH ONE TIME DAILY 90 tablet 3     SILICON DIOXIDE (SILICA GEL MISC) Use As Directed.       TURMERIC, BULK, MISC Use As Directed.       doxycycline (MONODOX) 100 MG capsule Take 1 capsule (100 mg total) by mouth 2 (two) times a day for 7  days. 14 capsule 0     hydrOXYzine (VISTARIL) 25 MG capsule TAKE 1-2 CAPSULES BY MOUTH EVERY 4-6 HOURS AS NEEDED FOR PAIN 40 capsule 2     ZINC ORAL Take by mouth.       Current Facility-Administered Medications   Medication Dose Route Frequency Provider Last Rate Last Dose     triamcinolone acetonide 40 mg/mL injection 40 mg (KENALOG-40)  40 mg Intra-articular Once Lisette Anand MD           Total Data Points: 0    Lizzie Valera APRN, CNP    This note has been dictated using voice recognition software. Any grammatical or context distortions are unintentional and inherent to the software

## 2021-06-11 NOTE — PROGRESS NOTES
Assessment & Plan:  1. Cervical Disc Degeneration  Patient with chronic neck pain and known cervical disc degeneration.  Patient often suffers headaches which is exacerbated by her fibromyalgia we have tried nonnarcotic methods to control her pain and she did not get good relief with this so she was sent to pain clinic.  Patient does not feel comfortable at written pain clinic and feels like she is being treated as a drug seeker.  Patient is seeking out care with acupuncture and chiropractor on her own.  She would like to explore what other options to the [facility] pain clinic  - Ambulatory referral to Pain Clinic    2. Fibromyalgia  Patient with fibromyalgia which makes it difficult to control her pain.  We discussed how she has gained weight recently and this makes her fibromyalgia pain worse.  We talked about healthy ways to lose weight importance of exercise to keep her fibromyalgia recommend continuing Cymbalta since she is doing well both mood and no side effects.  Told her we do not need to rotate medications and since she is doing well and not stop the medication  - Ambulatory referral to Pain Clinic      Orders Placed This Encounter   Procedures     Ambulatory referral to Pain Clinic     Referral Priority:   Routine     Referral Type:   Consultation     Referral Reason:   Evaluation and Treatment     Requested Specialty:   Pain Medicine     Number of Visits Requested:   1     There are no discontinued medications.    No Follow-up on file.    The following high BMI interventions were performed this visit: encouragement to exercise and lifestyle education regarding diet    Chief Complaint:   Chief Complaint   Patient presents with     Medication Management     Follow up depression     Hypertension     Has had some elevated reading.         History of Present Illness:  Sonya is a 46 y.o. female presenting to the clinic today for depression. She has been taking Cymbalta and denies side effects.  States her  mood is doing well.  she is not currently seeing a counselor. She is concerned about being on the medication long term.     Sleep: She had a sleep study done in the past , but she was told that she does not have sleep apnea. She is snoring frequently and notes it exacerbates with weight gain. She would like to be in another sleep study. She has been having bloody noses in the morning.     Review of Systems:  The nodule in her lung was found to be low suspicion for malignancy. She has headaches, frequently that she is unsure if they are related to allergies. She will be seeing a new rheumatologist, Rajat Nuñez through [facility]. Her cholesterol has been high but she is planning to change her diet to lower it as well as lose weight. She would like to be seen through the [facility].   She is not comfortable at [facility] pain clinic.  she goes to the chiropractor and does acupuncture. All other systems are negative.     PFSH:  Her daughter is living in [place], her older daughter graduated from college.     Tobacco Use:  History   Smoking Status     Former Smoker     Packs/day: 1.00     Years: 20.00   Smokeless Tobacco     Not on file     Comment: quit 2006       Vitals:  Vitals:    06/29/17 1248   BP: 114/74   Pulse: 82   Resp: 10   Temp: 99.3  F (37.4  C)   TempSrc: Oral   Weight: (!) 233 lb (105.7 kg)     Wt Readings from Last 3 Encounters:   06/29/17 (!) 233 lb (105.7 kg)   04/19/17 (!) 222 lb 5 oz (100.8 kg)   02/21/17 211 lb 12.8 oz (96.1 kg)     Body mass index is 36.49 kg/(m^2).      Physical Exam:  Constitutional:  Reveals an alert, cooperative, pleasant female in no acute distress.  Vitals:  Per nursing notes.  Cardiac:  Regular rate and rhythm without murmurs, rubs, or gallops.   Lungs: Clear.  Respiratory effort normal.  Neurologic:  No gross focal deficits.    Psychiatric:  Mood appropriate, memory intact.     Data Reviewed:  Additional history summarized (from old records or history from  someone other than the patient or another healthcare provider) (2 TOTAL): Reviewed note from 2/21/17 and 2/16/17 regarding pulmonology. Reviewed CT from 3/30/17.    Decision to obtain extra information (old records requested or history from another person or accessing Care Everywhere) (1 TOTAL): None.     Radiology tests summarized or ordered (XRAY/CT/MRI/DXA) (1 TOTAL): None.    Labs reviewed or ordered (1 TOTAL): Reviewed and ordered labs.     Medicine tests summarized or ordered (EKG/ECHO) (1 TOTAL): 1 reviewed sleep study.    Independent review of EKG or X-Ray (2 EACH): None.       The visit lasted a total of 22 minutes face to face with the patient. Over 50% of the time was spent counseling and educating the patient about medication management.    I, Maria Guadalupe Donahue, am scribing for and in the presence of, Dr. Soler.    I, Dr. Soler, personally performed the services described in this documentation, as scribed by Maria Guadalupe Donahue in my presence, and it is both accurate and complete.    Medications:  Current Outpatient Prescriptions   Medication Sig Dispense Refill     albuterol (VENTOLIN HFA) 90 mcg/actuation inhaler INHALE ONE OR TWO PUFFS BY MOUTH EVERY FOUR HOURS AS NEEDED FOR WHEEZING 1 Inhaler 0     ALPRAZolam (XANAX) 0.5 MG tablet TAKE ONE TABLET BY MOUTH THREE TIMES A DAY AS NEEDED FOR ANXIETY 30 tablet 0     azelastine (ASTELIN) 137 mcg (0.1 %) nasal spray USE 1 SPRAY INTO EACH NOSTRIL 2 TIMES A DAY  6     beclomethasone (QVAR) 40 mcg/actuation inhaler Inhale 1 puff 2 (two) times a day.       BIOTIN ORAL Take by mouth.       CHOLECALCIFEROL, VITAMIN D3, (VITAMIN D3 ORAL) Take 1 capsule by mouth daily.       COLLAGEN MISC Use As Directed.       diclofenac sodium (VOLTAREN) 1 % Gel APPLY 2-3 GRAMS 3-4 TIMES PER DAY AS NEEDED  0     DULoxetine (CYMBALTA) 20 MG capsule TAKE 1 CAPSULE (20 MG TOTAL) BY MOUTH 2 (TWO) TIMES A DAY. 180 capsule 3     FIBER, DEXTRIN, ORAL Take 1 tablet by mouth daily.        fluticasone-salmeterol (ADVAIR) 100-50 mcg/dose DISKUS Inhale 1 puff 2 (two) times a day.       HYALURONATE SODIUM (HYALURONIC ACID, SODIUM, ORAL) Take by mouth.       HYDROcodone-acetaminophen 5-325 mg per tablet Take 1-2 tablets by mouth every 4 (four) hours as needed for pain. 15 tablet 0     hydrOXYzine (VISTARIL) 25 MG capsule TAKE 1-2 CAPSULES BY MOUTH EVERY 4-6 HOURS AS NEEDED FOR PAIN 40 capsule 2     ibuprofen (ADVIL,MOTRIN) 200 MG tablet Take 600 mg by mouth every 6 (six) hours as needed for pain.        LACTOBACILLUS ACIDOPHILUS (ACIDOPHILUS ORAL) Take 1 capsule by mouth daily.       magnesium 200 mg Tab Take 1 tablet by mouth daily.       multivitamin (ONE A DAY) per tablet Take 1 tablet by mouth daily.       omega-3 fatty acids-vitamin E (FISH OIL) 1,000 mg cap Take 1 capsule by mouth daily.       omeprazole (PRILOSEC) 20 MG capsule TAKE ONE CAPSULE BY MOUTH ONE TIME DAILY 90 capsule 1     pravastatin (PRAVACHOL) 10 MG tablet TAKE ONE TABLET BY MOUTH ONE TIME DAILY 90 tablet 3     predniSONE (DELTASONE) 20 MG tablet TAKE TWO TABS BY MOUTH DAILY FOR 5 DAYS  0     SILICON DIOXIDE (SILICA GEL MISC) Use As Directed.       TURMERIC, BULK, MISC Use As Directed.       IODINE ORAL Take by mouth.       meloxicam (MOBIC) 15 MG tablet Take 1 tablet (15 mg total) by mouth daily. 30 tablet 3     METHYLSULFONYLMETHANE (MSM ORAL) Take by mouth.       ZINC ORAL Take by mouth.       Current Facility-Administered Medications   Medication Dose Route Frequency Provider Last Rate Last Dose     triamcinolone acetonide 40 mg/mL injection 40 mg (KENALOG-40)  40 mg Intra-articular Once Lisette Anand MD           Total Data Points: 4

## 2021-06-12 NOTE — PROGRESS NOTES
"Sonya Mosqueda is a 49 y.o. female who is being evaluated via a billable video visit.      The patient has been notified of following:     \"This video visit will be conducted via a call between you and your physician/provider. We have found that certain health care needs can be provided without the need for an in-person physical exam.  This service lets us provide the care you need with a video conversation.  If a prescription is necessary we can send it directly to your pharmacy.  If lab work is needed we can place an order for that and you can then stop by our lab to have the test done at a later time.    Video visits are billed at different rates depending on your insurance coverage. Please reach out to your insurance provider with any questions.    If during the course of the call the physician/provider feels a video visit is not appropriate, you will not be charged for this service.\"    Patient has given verbal consent to a Video visit? Yes  How would you like to obtain your AVS? AVS Preference: MyChart.  If dropped by the video visit, the video invitation should be sent to: Text to cell phone: [phone]  Will anyone else be joining your video visit? No        Video Start Time: 1025am    -------------------------    Assessment/Plan:    Sonya Mosqueda is a 49 y.o. female presenting for:    1. Menopausal syndrome (hot flashes)  Restart estrogen.  Patient states that the Premarin was fairly expensive.  Estrace sent to the pharmacy.  - estradioL (ESTRACE) 0.5 MG tablet; Take 1 tablet (0.5 mg total) by mouth daily.  Dispense: 90 tablet; Refill: 3    2. Pure hypercholesterolemia  Patient states that recent cholesterol levels were elevated.  She is concerned that estrogen will cause these to increase even further.  I did recommend that she come in in 2 to 3 months for a complete physical examination at which point we can recheck cholesterol panel.        Medications Discontinued During This Encounter   Medication " Reason     phentermine 15 MG capsule Therapy completed     topiramate (TOPAMAX) 25 MG tablet Therapy completed           Chief Complaint:  Chief Complaint   Patient presents with     Concerns     Discuss hormone replacement medications       Subjective:   Sonya Mosqueda is a pleasant 49 y.o. female being evaluated via video visit today for the following concern/s:     Hot flashes: Patient and I discussed this back in December.  She has had a hysterectomy for noncancerous purposes.  She was started on Premarin tablets and took these for about 6 months.  She states that they were very beneficial.  She stopped taking these about 4 months ago and did not have recurrence of hot flashes until about 3 weeks ago.  Because of this reoccurrence she has been having difficulty sleeping.  This occurs throughout the day and at night.  She is wondering about getting back on hormonal replacement.  She also notes some vaginal dryness.  There is no personal or family history in a first-degree relative of breast cancer.  No history of blood clots.    Hyperlipidemia: Patient recently had cholesterol levels checked.  She states that they were elevated.  This was done at a work wellness event.  She states her HDL was high    12 point review of systems completed and negative except for what has been described above    Social History     Tobacco Use   Smoking Status Former Smoker     Packs/day: 1.00     Years: 20.00     Pack years: 20.00   Smokeless Tobacco Never Used   Tobacco Comment    quit 2006       Current Outpatient Medications   Medication Sig     albuterol (VENTOLIN HFA) 90 mcg/actuation inhaler INHALE ONE OR TWO PUFFS BY MOUTH EVERY FOUR HOURS AS NEEDED FOR WHEEZING     ALPRAZolam (XANAX) 0.5 MG tablet Take 1 tablet (0.5 mg total) by mouth 3 (three) times a day as needed for anxiety.     biotin 5,000 mcg TbDL Take 1 tablet by mouth daily. 1 tablet daily     cholecalciferol, vitamin D3, (VITAMIN D3) 50 mcg (2,000 unit) Tab Take 1  "capsule by mouth daily.      dextrin (FIBER, DEXTRIN,) 3 gram/3.5 gram Powd Take 1 tablet by mouth daily.      diclofenac sodium (VOLTAREN) 1 % Gel APPLY 2-3 GRAMS 3-4 TIMES PER DAY AS NEEDED     DULoxetine (CYMBALTA) 20 MG capsule Take 1 capsule (20 mg total) by mouth 2 (two) times a day.     HYDROcodone-acetaminophen 5-325 mg per tablet Take 1-2 tablets by mouth every 4 (four) hours as needed for pain.     hydrOXYzine HCl (ATARAX) 25 MG tablet TAKE 1-2 TABLETS (25-50 MG TOTAL) BY MOUTH EVERY 6 (SIX) HOURS AS NEEDED (PAIN).     ibuprofen (ADVIL,MOTRIN) 200 MG tablet Take 600 mg by mouth every 6 (six) hours as needed for pain.      ketoconazole (NIZORAL) 2 % shampoo Lather on scalp, leave on for 5 minutes and rinse     magnesium 200 mg Tab Take 1 tablet by mouth daily.     montelukast (SINGULAIR) 10 mg tablet Take one daily     multivitamin (ONE A DAY) per tablet Take 1 tablet by mouth daily.     omega-3 fatty acids-vitamin E (FISH OIL) 1,000 mg cap Take 1 capsule by mouth daily.     omeprazole (PRILOSEC) 20 MG capsule Take 1 capsule (20 mg total) by mouth 2 (two) times a day before meals. (Patient taking differently: Take 20 mg by mouth once. )     pravastatin (PRAVACHOL) 10 MG tablet Take 1 tablet (10 mg total) by mouth daily.     QVAR REDIHALER 40 mcg/actuation HFAB inhaler USE 1 INHALATION BY MOUTH  TWICE DAILY     estradioL (ESTRACE) 0.5 MG tablet Take 1 tablet (0.5 mg total) by mouth daily.         Objective:  Vitals - Patient Reported 7/3/2020   Height (Patient Reported) 5' 6.5\"   Weight (Patient Reported) 212 lb 8 oz   BMI (Based on Pt Reported Ht/Wt) 33.78 kg/m2           There is no height or weight on file to calculate BMI.    General: No acute distress  Psych: Appropriate affect  HEENT: moist mucous membranes  Pulmonary: Breathing comfortably, speaking in complete sentences  Extremities: warm and well perfused with no edema  Skin: warm and dry with no rash         This note has been dictated and " transcribed using voice recognition software.   Any errors in transcription are unintentional and inherent to the software.        Video-Visit Details    Type of service:  Video Visit    Video End Time (time video stopped): 10:42 AM  Originating Location (pt. Location): Home    Distant Location (provider location):  [facility]     Platform used for Video Visit: Benny Mejía MD

## 2021-06-12 NOTE — PROGRESS NOTES
Assessment/ Plan     1. Peripheral edema  May be secondary to chronic venous insufficiency    Recommend elevation of her legs  She can wear compression stockings  Prescribed furosemide 20 mg to take as needed  Recommend working on weight loss  Recommend follow-up if not improving    2. Psoriatic arthritis    Continue recommendations of rheumatology    3. Obesity    Refer to the [facility] weight loss program  - Ambulatory referral for Weight Management    25 minutes were spent with the patient and greater than 50% of the time was spent in face to face counseling and coordination of care        Subjective:       Sonya Mosqueda is a 46 y.o. female who presents to the clinic as she is concerned about a recent history of lower extremity swelling.  Her medical history is notable for psoriatic arthritis and fibromyalgia.  She has noticed recently that she has had some swelling in her legs.  Her energy level has been low.  She denies calf pain or focal weakness.  She has had previous leg vein surgery.  She has had swelling and discomfort involving her toes and feet.  She has had a sense of puffiness involving her fingers.  Denies chest pain or shortness of breath.  Her primary concern is that she has had significant weight gain over time.  She would like some assistance with weight loss.    The following portions of the patient's history were reviewed and updated as appropriate: allergies, current medications, past family history, past medical history, past social history, past surgical history and problem list.    Review of Systems   A 12 point comprehensive review of systems was negative except as noted.      Current Outpatient Prescriptions   Medication Sig Dispense Refill     albuterol (VENTOLIN HFA) 90 mcg/actuation inhaler INHALE ONE OR TWO PUFFS BY MOUTH EVERY FOUR HOURS AS NEEDED FOR WHEEZING 1 Inhaler 0     azelastine (ASTELIN) 137 mcg (0.1 %) nasal spray USE 1 SPRAY INTO EACH NOSTRIL 2 TIMES A DAY  6      beclomethasone (QVAR) 40 mcg/actuation inhaler Inhale 1 puff 2 (two) times a day.       BIOTIN ORAL Take by mouth.       CHOLECALCIFEROL, VITAMIN D3, (VITAMIN D3 ORAL) Take 1 capsule by mouth daily.       COLLAGEN MISC Use As Directed.       diclofenac sodium (VOLTAREN) 1 % Gel APPLY 2-3 GRAMS 3-4 TIMES PER DAY AS NEEDED  0     DULoxetine (CYMBALTA) 20 MG capsule TAKE 1 CAPSULE (20 MG TOTAL) BY MOUTH 2 (TWO) TIMES A DAY. 180 capsule 3     FIBER, DEXTRIN, ORAL Take 1 tablet by mouth daily.       fluticasone-salmeterol (ADVAIR) 100-50 mcg/dose DISKUS Inhale 1 puff 2 (two) times a day.       HYALURONATE SODIUM (HYALURONIC ACID, SODIUM, ORAL) Take by mouth.       HYDROcodone-acetaminophen 5-325 mg per tablet Take 1-2 tablets by mouth every 4 (four) hours as needed for pain. 15 tablet 0     hydrOXYzine (VISTARIL) 25 MG capsule TAKE 1-2 CAPSULES BY MOUTH EVERY 4-6 HOURS AS NEEDED FOR PAIN 40 capsule 2     ibuprofen (ADVIL,MOTRIN) 200 MG tablet Take 600 mg by mouth every 6 (six) hours as needed for pain.        LACTOBACILLUS ACIDOPHILUS (ACIDOPHILUS ORAL) Take 1 capsule by mouth daily.       magnesium 200 mg Tab Take 1 tablet by mouth daily.       multivitamin (ONE A DAY) per tablet Take 1 tablet by mouth daily.       omega-3 fatty acids-vitamin E (FISH OIL) 1,000 mg cap Take 1 capsule by mouth daily.       pravastatin (PRAVACHOL) 10 MG tablet TAKE ONE TABLET BY MOUTH ONE TIME DAILY 90 tablet 3     SILICON DIOXIDE (SILICA GEL MISC) Use As Directed.       TURMERIC, BULK, MISC Use As Directed.       ALPRAZolam (XANAX) 0.5 MG tablet TAKE ONE TABLET BY MOUTH THREE TIMES A DAY AS NEEDED FOR ANXIETY 30 tablet 0     furosemide (LASIX) 20 MG tablet Take 1 tablet (20 mg total) by mouth daily. 30 tablet 1     omeprazole (PRILOSEC) 40 MG capsule TAKE ONE CAPSULE BY MOUTH ONE TIME DAILY 90 capsule 0     phentermine (ADIPEX-P) 37.5 mg tablet Take 0.5 tablets (18.75 mg total) by mouth 2 (two) times a day. 30 tablet 0     Current  "Facility-Administered Medications   Medication Dose Route Frequency Provider Last Rate Last Dose     triamcinolone acetonide 40 mg/mL injection 40 mg (KENALOG-40)  40 mg Intra-articular Once Lisette Anand MD           Objective:      /74  Pulse 88  Temp 98.8  F (37.1  C) (Oral)   Resp 16  Ht 5' 7\" (1.702 m)  Wt (!) 240 lb (108.9 kg)  BMI 37.59 kg/m2      General appearance: alert, appears stated age and cooperative  Head: Normocephalic, without obvious abnormality, atraumatic  Eyes: conjunctivae/corneas clear. PERRL, EOM's intact  Nose: Nares normal. Septum midline. Mucosa normal. No drainage or sinus tenderness.  Throat: lips, mucosa, and tongue normal; teeth and gums normal  Neck: no adenopathy, no carotid bruit, no JVD, supple, symmetrical, trachea midline and thyroid not enlarged, symmetric, no tenderness/mass/nodules  Back: symmetric, no curvature. ROM normal. No CVA tenderness.  Lungs: clear to auscultation bilaterally  Heart: regular rate and rhythm, S1, S2 normal, no murmur, click, rub or gallop  Abdomen: soft, non-tender; bowel sounds normal; no masses,  no organomegaly  Extremities: There is a trace of lower extremity edema bilaterally  There is no erythema or palpable cord involving the calf  Pulses: 2+ and symmetric  Skin: Skin color, texture, turgor normal. No rashes or lesions  Lymph nodes: Cervical, supraclavicular, and axillary nodes normal.  Neurologic: Alert and oriented X 3, normal strength and tone. Normal coordination and gait         Recent Results (from the past 168 hour(s))   Thyroid Cascade   Result Value Ref Range    TSH 2.69 0.30 - 5.00 uIU/mL   Glycosylated Hemoglobin A1c   Result Value Ref Range    Hemoglobin A1c 5.5 3.5 - 6.0 %   HM2(CBC w/o Differential)   Result Value Ref Range    WBC 7.1 4.0 - 11.0 thou/uL    RBC 5.14 3.80 - 5.40 mill/uL    Hemoglobin 13.8 12.0 - 16.0 g/dL    Hematocrit 41.5 35.0 - 47.0 %    MCV 81 80 - 100 fL    MCH 26.9 (L) 27.0 - 34.0 pg    MCHC " 33.3 32.0 - 36.0 g/dL    RDW 11.9 11.0 - 14.5 %    Platelets 310 140 - 440 thou/uL    MPV 7.8 7.0 - 10.0 fL          This note has been dictated using voice recognition software. Any grammatical or context distortions are unintentional and inherent to the software

## 2021-06-12 NOTE — PROGRESS NOTES
"Assessment/Plan:    Obesity  46 y.o. year old female in clinic today to discuss treatment of the following conditions through radical diet change, lifestyle modification and weight loss:  1. Obesity    2. Acid reflux    3. Osteoarthritis Of The Knee    4. Snoring    5. Pure hypercholesterolemia    6. Bulimia nervosa    7. Vitamin D deficiency    8. Venous insufficiency of both lower extremities    9. Screening for diabetes mellitus    10. Screening for thyroid disorder    11. Screening for deficiency anemia      The patient is motivated by desire to improve her energy, resolve her knee pain and \"feel better.\"  During this encounter, reviewed a note by her rheumatologist in which the patient discussed knee pain as well as the potential for psoriatic arthritis.  I also reviewed a note with another family physician in which the patient described lower extremity edema which is thought to be secondary to weight and abdominal girth.  She describes a very disordered approach to eating with extremely high percentage of her calories coming from refined sugar sources.  She is a distant history of eating disorder behaviors but she does not believe that she currently has bulimia or anorexia.  Her eating behaviors at this time, do not appear to be consistent with an eating disorder.  We discussed the potential risks of a program such as ours for somebody with an eating disorder and the patient verbalized understanding.  She has access to healthy foods in her kitchen as her  eats extremely well.  We discussed the risks and benefits of medication to suppress her appetite the patient is comfortable with this type of medication and understands that this is to help facilitate diet change and lifestyle change and did not suppress her appetite in order to reduce her calories to an unhealthy amount.  We discussed tracking as a way of limiting carbohydrates.  We discussed a high protein low carbohydrate diet.      Medications " prescribed today:   -Phentermine was prescribed today.  I reviewed the December 2016 EKG which showed normal sinus rhythm.    The patient attended group visit to learn about obesity as a disease, an approach to treatment and metabolic factors. Nutrition counseling reviewed., Discussed all weight loss options with the patient including bariatric surgery. The patient expressed understanding and wishes to pursue medical weight loss at this time., Labs ordered and will review and discuss with the patient., Body composition analyzer done and results reviewed with the patient. Please see scanned results in chart. and Recommend 2000 mg of fish oil daily, a multivitamin daily, chromium as directed, and vitamin D supplementation as directed.    Dietary Intervention: Reduced calorie, reduced carbohydrates, whole food diet., Recommend increasing movement throughout the day--sitting less, moving more. Will increase activity over time to reach a goal of at least 150 minutes of moderate exercise each week., Behavior modification: Cognitive restructuring exercises, journaling stressors, triggers for food cravings., Recommend journaling and tracking food intake using either an online program such as [employer] or loseit.com or tracking using a paper and pencil. Advised paying particular attention to total carbohydrates, fiber, protein, calories, and fats, and added sugars. and Greater than 50% of this 30 minute visit was spent in counseling regarding obesity is a disease as well as the nutritional and exercise recommendations of our program as it pertains to the patient's own individual healthcare needs.           Return to clinic in 4 weeks.               This note has been dictated using voice recognition software. Any grammatical or context distortions are unintentional and inherent to the software    __________________________________________________________________    SUBJECTIVE:    46 y.o. year old female with past  medical history including depression/anxiety, fibromyalgia, lower extremity edema, hypercholesterolemia, GERD presenting to clinic today to discuss treatment of these conditions through radical diet change, lifestyle modification, and weight loss (intensive therapeutic lifestyle interventions including nutrition, physical activity, and behavior management).    The patient has been overweight for a number of years.  She believes that her weight gain has been multifactorial.  She was 170 pounds as recently as a couple of years ago.  She is able to lose weight at that time through hCG injections and a low calorie diet.  She was unable to transition to normal food at the end of that treatment plan.  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  She also does not like feeling weak.  Her mother has obesity and she would like to avoid this.  The chart shows that she is a history of bulimia nervosa.  The patient does admit to some purging behaviors in the distant past.  Most recently this occurred approximately 23 years ago prior to the birth of her daughter.  She does believe that she has an addiction to sugar but currently does not believe that she has an active eating disorder.    Previous efforts at weight loss: HCG, weight watchers, some type of study through the [facility].    The patient has been overweight for 9 years.  Contributing factors includes: sugar addiction.  The patient does not exercise.  The patient does have problems (physical, logistical) with exercise.  The patient does not eat nutritiously.  The patient does overeat.  The patient does snore.  The patient does have a history of eating disorder.  The patient does not have a history of substance abuse (alcohol, drugs).    Social effects of obesity: low self esteem, body image dissatisfaction, diminished sex drive, impaired  "intimacy and sexual relationships or decreased work productivity    Definition of success:   - qualitative: \"Feel better, reduce knee pain, increased energy.\"   - goal weight: 130-145 (last time the patient was at this weight: 9 years ago)    Woman's health: Status post hysterectomy   - Number of children:  2    Patient Active Problem List   Diagnosis     Asthma     Thoracic Outlet Syndrome     Nicotine Dependence - In Remission     Generalized Anxiety Disorder     Osteoarthritis Of The Knee     Vitamin D Deficiency     Pure hypercholesterolemia     Major Depression, Recurrent     Fibromyalgia     Obesity     Bulimia nervosa     Acid reflux     Venous insufficiency of both lower extremities     Symptomatic varicose veins of both lower extremities     Psoriatic arthritis     Mediastinal adenopathy     Snoring       Past Medical History:   Diagnosis Date     Anxiety disorder      Endometriosis     Created by Conversion  Replacement Utility updated for latest IMO load     Fibromyalgia      GERD (gastroesophageal reflux disease)      HLD (hyperlipidemia)      Nephrolithiasis     Created by Conversion      Spontaneous      Created by Conversion  Replacement Utility updated for latest IMO load       Past Surgical History:   Procedure Laterality Date     HYSTERECTOMY  2013     OOPHORECTOMY Right 2013     [place] CRYOCAUTERY OF CERVIX      Description: Cervix Cryosurgery;  Recorded: 2007;     [place] DILATION/CURETTAGE,DIAGNOSTIC      Description: Dilation And Curettage;  Recorded: 2007;     [place] REVISE MEDIAN N/CARPAL TUNNEL SURG      Description: Neuroplasty Decompression Median Nerve At Carpal Tunnel;  Recorded: 2007;     [place] TOTAL ABDOM HYSTERECTOMY      Description: Hysterectomy;  Proc Date: 2013;  Comments: both ovaries gone       Current Outpatient Prescriptions on File Prior to Visit   Medication Sig Dispense Refill     albuterol (VENTOLIN HFA) 90 mcg/actuation inhaler INHALE ONE OR TWO PUFFS BY " MOUTH EVERY FOUR HOURS AS NEEDED FOR WHEEZING 1 Inhaler 0     ALPRAZolam (XANAX) 0.5 MG tablet TAKE ONE TABLET BY MOUTH THREE TIMES A DAY AS NEEDED FOR ANXIETY 30 tablet 0     azelastine (ASTELIN) 137 mcg (0.1 %) nasal spray USE 1 SPRAY INTO EACH NOSTRIL 2 TIMES A DAY  6     beclomethasone (QVAR) 40 mcg/actuation inhaler Inhale 1 puff 2 (two) times a day.       BIOTIN ORAL Take by mouth.       CHOLECALCIFEROL, VITAMIN D3, (VITAMIN D3 ORAL) Take 1 capsule by mouth daily.       COLLAGEN MISC Use As Directed.       diclofenac sodium (VOLTAREN) 1 % Gel APPLY 2-3 GRAMS 3-4 TIMES PER DAY AS NEEDED  0     DULoxetine (CYMBALTA) 20 MG capsule TAKE 1 CAPSULE (20 MG TOTAL) BY MOUTH 2 (TWO) TIMES A DAY. 180 capsule 3     FIBER, DEXTRIN, ORAL Take 1 tablet by mouth daily.       fluticasone-salmeterol (ADVAIR) 100-50 mcg/dose DISKUS Inhale 1 puff 2 (two) times a day.       furosemide (LASIX) 20 MG tablet Take 1 tablet (20 mg total) by mouth daily. 30 tablet 1     HYALURONATE SODIUM (HYALURONIC ACID, SODIUM, ORAL) Take by mouth.       HYDROcodone-acetaminophen 5-325 mg per tablet Take 1-2 tablets by mouth every 4 (four) hours as needed for pain. 15 tablet 0     hydrOXYzine (VISTARIL) 25 MG capsule TAKE 1-2 CAPSULES BY MOUTH EVERY 4-6 HOURS AS NEEDED FOR PAIN 40 capsule 2     ibuprofen (ADVIL,MOTRIN) 200 MG tablet Take 600 mg by mouth every 6 (six) hours as needed for pain.        LACTOBACILLUS ACIDOPHILUS (ACIDOPHILUS ORAL) Take 1 capsule by mouth daily.       magnesium 200 mg Tab Take 1 tablet by mouth daily.       multivitamin (ONE A DAY) per tablet Take 1 tablet by mouth daily.       omega-3 fatty acids-vitamin E (FISH OIL) 1,000 mg cap Take 1 capsule by mouth daily.       omeprazole (PRILOSEC) 40 MG capsule TAKE ONE CAPSULE BY MOUTH ONE TIME DAILY 90 capsule 0     pravastatin (PRAVACHOL) 10 MG tablet TAKE ONE TABLET BY MOUTH ONE TIME DAILY 90 tablet 3     SILICON DIOXIDE (SILICA GEL MISC) Use As Directed.       TURMERIC, BULK,  "MISC Use As Directed.       [DISCONTINUED] IODINE ORAL Take by mouth.       [DISCONTINUED] METHYLSULFONYLMETHANE (MSM ORAL) Take by mouth.       [DISCONTINUED] sulfaSALAzine (AZULFIDINE) 500 mg tablet Take 1,000 mg by mouth 2 (two) times a day.        Current Facility-Administered Medications on File Prior to Visit   Medication Dose Route Frequency Provider Last Rate Last Dose     triamcinolone acetonide 40 mg/mL injection 40 mg (KENALOG-40)  40 mg Intra-articular Once Lisetet Anand MD           Allergies   Allergen Reactions     Bupropion Hcl Other (See Comments)     seizure     Smallpox Vaccine,Live Other (See Comments)     Per pt.\"My Mother was told never to allow it to be given again.\" Reaction when pt was a child     Cephalosporins Rash     Penicillins Rash     Small one on side         Family History   Problem Relation Age of Onset     Breast cancer Paternal Grandmother 60     Stroke Mother      Heart attack Mother      Coronary artery disease Mother      Hypertension Mother      Atrial fibrillation Father        Social History     Social History     Marital status:      Spouse name: N/A     Number of children: N/A     Years of education: N/A     Social History Main Topics     Smoking status: Former Smoker     Packs/day: 1.00     Years: 20.00     Smokeless tobacco: Never Used      Comment: quit 2006     Alcohol use No     Drug use: None     Sexual activity: Not Asked     Other Topics Concern     None     Social History Narrative         ROS  A comprehensive review of systems was negative.  Pertinent items are noted in HPI.  Patient denies chest pain or pressure, shortness of breath, exertional intolerance, palpitations, or lightheadedness.    Vitals:    08/08/17 0809   BP: 128/80   Pulse: 84     Initial Weight: 239 lbs  Weight: 239 lb (108.4 kg)  Weight loss from initial: 0  % Weight loss: 0 %  Body mass index is 38 kg/(m^2).    EXAM    Gen: Alert, pleasant, cooperative, no distress, appears stated " age, patient is obese.  Psych: Normal affect.  Normal rate of speech.  No tangentiality.    Skin: No edema at ankles bilaterally.    Body composition analyzer done and results reviewed with the patient.  Please see scanned results in chart.  Labs ordered and will review and discuss with the patient.    Recent Results (from the past 24 hour(s))   Glycosylated Hemoglobin A1c   Result Value Ref Range    Hemoglobin A1c 5.5 3.5 - 6.0 %   HM2(CBC w/o Differential)   Result Value Ref Range    WBC 7.1 4.0 - 11.0 thou/uL    RBC 5.14 3.80 - 5.40 mill/uL    Hemoglobin 13.8 12.0 - 16.0 g/dL    Hematocrit 41.5 35.0 - 47.0 %    MCV 81 80 - 100 fL    MCH 26.9 (L) 27.0 - 34.0 pg    MCHC 33.3 32.0 - 36.0 g/dL    RDW 11.9 11.0 - 14.5 %    Platelets 310 140 - 440 thou/uL    MPV 7.8 7.0 - 10.0 fL

## 2021-06-12 NOTE — PROGRESS NOTES
Assessment and Plan:     Obesity  46 y.o. year old female in clinic today to discuss treatment of the following conditions through diet and lifestyle modification and weight loss:  1. Obesity    2. Snoring    3. Pure hypercholesterolemia    4. Vitamin D deficiency      The patient's efforts this past month were successful as evidenced by weight loss.  I recommend that the patient focus on ensuring adequate nutrition.   - continue phentermine   - continue tracking      Follow up in 1 month.  Continue supplements.    Recommend increasing movement throughout the day--sitting less, moving more.  Will increase activity over time to reach a goal of at least 150 minutes of moderate exercise each week.  Behavior modification:  Cognitive restructuring exercises, journaling stressors, triggers for food cravings.  Dietary Intervention:  Reduced calorie, reduced carbohydrates, whole food diet.  Greater than 50% of this 15 minute visit was spent in counseling regarding patient's obesity, medications, dietary, exercise, and behavior modification recommendations.      Subjective  Patient presents for treatment of chronic, comorbid conditions using intensive therapeutic lifestyle interventions including nutrition, physical activity, and behavior management.    Feels good.  Had been feeling jittery.  Improved with no coffee.  Tracking  Will be going to [place] in a couple of weeks  Targeting whole food.  Protein sources: eggs, beans, cheese, tofu, protein shake daily.  Carbs: apple.  Otherwise tries to limit.   Is now back to school.    Are you experiencing any side effects to the medications:  No  Hunger control:  good  Exercise was discussed: yes  Taking supplements:  yes  Discussed journaling food:  yes  Patient is pleased with the current results:  yes  The patient is following the nutrition plan:  yes  Barriers to losing weight:  Behavior:  inadequate exercise    Patient Active Problem List   Diagnosis     Asthma     Thoracic  Outlet Syndrome     Nicotine Dependence - In Remission     Generalized Anxiety Disorder     Osteoarthritis Of The Knee     Vitamin D Deficiency     Pure hypercholesterolemia     Major Depression, Recurrent     Fibromyalgia     Obesity     Bulimia nervosa     Acid reflux     Venous insufficiency of both lower extremities     Symptomatic varicose veins of both lower extremities     Psoriatic arthritis     Mediastinal adenopathy     Snoring       Current Outpatient Prescriptions on File Prior to Visit   Medication Sig Dispense Refill     albuterol (VENTOLIN HFA) 90 mcg/actuation inhaler INHALE ONE OR TWO PUFFS BY MOUTH EVERY FOUR HOURS AS NEEDED FOR WHEEZING 1 Inhaler 0     ALPRAZolam (XANAX) 0.5 MG tablet TAKE ONE TABLET BY MOUTH THREE TIMES A DAY AS NEEDED FOR ANXIETY 30 tablet 0     azelastine (ASTELIN) 137 mcg (0.1 %) nasal spray USE 1 SPRAY INTO EACH NOSTRIL 2 TIMES A DAY  6     beclomethasone (QVAR) 40 mcg/actuation inhaler Inhale 1 puff 2 (two) times a day.       BIOTIN ORAL Take by mouth. 1 tablet daily       CHOLECALCIFEROL, VITAMIN D3, (VITAMIN D3 ORAL) Take 1 capsule by mouth daily.       diclofenac sodium (VOLTAREN) 1 % Gel APPLY 2-3 GRAMS 3-4 TIMES PER DAY AS NEEDED  0     DULoxetine (CYMBALTA) 20 MG capsule TAKE 1 CAPSULE (20 MG TOTAL) BY MOUTH 2 (TWO) TIMES A DAY. 180 capsule 3     FIBER, DEXTRIN, ORAL Take 1 tablet by mouth daily.       fluticasone-salmeterol (ADVAIR) 100-50 mcg/dose DISKUS Inhale 1 puff 2 (two) times a day.       HYDROcodone-acetaminophen 5-325 mg per tablet Take 1-2 tablets by mouth every 4 (four) hours as needed for pain. 15 tablet 0     hydrOXYzine (VISTARIL) 25 MG capsule TAKE 1-2 CAPSULES BY MOUTH EVERY 4-6 HOURS AS NEEDED FOR PAIN 40 capsule 0     ibuprofen (ADVIL,MOTRIN) 200 MG tablet Take 600 mg by mouth every 6 (six) hours as needed for pain.        magnesium 200 mg Tab Take 1 tablet by mouth daily.       multivitamin (ONE A DAY) per tablet Take 1 tablet by mouth daily.        omega-3 fatty acids-vitamin E (FISH OIL) 1,000 mg cap Take 1 capsule by mouth daily.       omeprazole (PRILOSEC) 40 MG capsule TAKE ONE CAPSULE BY MOUTH ONE TIME DAILY 90 capsule 0     pravastatin (PRAVACHOL) 10 MG tablet TAKE ONE TABLET BY MOUTH ONE TIME DAILY 90 tablet 3     [DISCONTINUED] TURMERIC, BULK, MISC Use As Directed.       [DISCONTINUED] COLLAGEN MISC Use As Directed. 1 tablet daily       [DISCONTINUED] diethylpropion 75 mg TbER Take 75 mg by mouth daily. 30 each 0     [DISCONTINUED] furosemide (LASIX) 20 MG tablet Take 1 tablet (20 mg total) by mouth daily. 30 tablet 1     [DISCONTINUED] HYALURONATE SODIUM (HYALURONIC ACID, SODIUM, ORAL) Take by mouth.       [DISCONTINUED] LACTOBACILLUS ACIDOPHILUS (ACIDOPHILUS ORAL) Take 1 capsule by mouth daily.       [DISCONTINUED] SILICON DIOXIDE (SILICA GEL MISC) Use As Directed.       Current Facility-Administered Medications on File Prior to Visit   Medication Dose Route Frequency Provider Last Rate Last Dose     triamcinolone acetonide 40 mg/mL injection 40 mg (KENALOG-40)  40 mg Intra-articular Once Lisette Anand MD           Objective:  Vitals:    09/11/17 1552   BP: 124/72   Pulse: 80     Initial Weight: 239 lbs  Weight: 218 lb (98.9 kg)  Weight loss from initial: 21  % Weight loss: 8.79 %  Body mass index is 34.66 kg/(m^2).  General:  Patient is alert, pleasant, no distress.  Patient is obese.    This note has been dictated using voice recognition software. Any grammatical or context distortions are unintentional and inherent to the software

## 2021-06-13 NOTE — TELEPHONE ENCOUNTER
Controlled Substance Refill Request  Medication Name:   Requested Prescriptions     Pending Prescriptions Disp Refills     hydrOXYzine HCL (ATARAX) 25 MG tablet 360 tablet 3     Sig: Take 1-2 tablets (25-50 mg total) by mouth every 6 (six) hours as needed (pain).     ALPRAZolam (XANAX) 0.5 MG tablet 30 tablet 0     Sig: Take 1 tablet (0.5 mg total) by mouth 3 (three) times a day as needed for anxiety.     Date Last Fill: 07/26/2019; 03/20/2020   Is patient out of medication?: No, 3 days left  Patient notified refills processed within 3 business days:  Yes  Requested Pharmacy: [facility]  Submit electronically to pharmacy  Controlled Substance Agreement on file:   Encounter-Level CSA Scan Date:    There are no encounter-level csa scan date.        Last office visit:  10/16/2020

## 2021-06-13 NOTE — TELEPHONE ENCOUNTER
Refill Approved    Rx renewed per Medication Renewal Policy. Medication was last renewed on 7/1/20.    Chitra Marte, [facility] Triage/Med Refill 12/14/2020     Requested Prescriptions   Pending Prescriptions Disp Refills     pravastatin (PRAVACHOL) 10 MG tablet [Pharmacy Med Name: PRAVASTATIN SOD 10MG TABLET] 90 tablet 3     Sig: TAKE 1 TABLET BY MOUTH  DAILY       Statins Refill Protocol (Hmg CoA Reductase Inhibitors) Passed - 12/12/2020  4:55 AM        Passed - PCP or prescribing provider visit in past 12 months      Last office visit with prescriber/PCP: 12/30/2019 Lamar Mejía MD OR same dept: Visit date not found OR same specialty: 12/30/2019 Lamar Mejía MD  Last physical: Visit date not found Last MTM visit: Visit date not found   Next visit within 3 mo: Visit date not found  Next physical within 3 mo: Visit date not found  Prescriber OR PCP: Lamar Mejía MD  Last diagnosis associated with med order: 1. Hyperlipemia  - pravastatin (PRAVACHOL) 10 MG tablet [Pharmacy Med Name: PRAVASTATIN SOD 10MG TABLET]; TAKE 1 TABLET BY MOUTH  DAILY  Dispense: 90 tablet; Refill: 3    If protocol passes may refill for 12 months if within 3 months of last provider visit (or a total of 15 months).

## 2021-06-13 NOTE — TELEPHONE ENCOUNTER
RN cannot approve Refill Request    RN can NOT refill this medication med is not covered by policy/route to provider. Last office visit: 12/30/2019 Lamar Mejía MD Last Physical: Visit date not found Last MTM visit: Visit date not found Last visit same specialty: 12/30/2019 Lamar Mejía MD.  Next visit within 3 mo: Visit date not found  Next physical within 3 mo: Visit date not found      Gabriella Amezcua, [facility] Triage/Med Refill 12/17/2020    Requested Prescriptions   Pending Prescriptions Disp Refills     ketoconazole (NIZORAL) 2 % shampoo [Pharmacy Med Name: KETOCONAZOLE 2% SHAMPOO] 120 mL 3     Sig: LATHER ON SCALP, LEAVE ON FOR 5 MINUTES AND RINSE       There is no refill protocol information for this order

## 2021-06-13 NOTE — TELEPHONE ENCOUNTER
Refill Approved    Rx renewed per Medication Renewal Policy. Medication was last renewed on 11/26/19.    Chitra Marte, [facility] Triage/Med Refill 12/15/2020     Requested Prescriptions   Pending Prescriptions Disp Refills     albuterol (VENTOLIN HFA) 90 mcg/actuation inhaler [Pharmacy Med Name: VENTOLIN HFA 90 MCG INHALER] 18 Inhaler 2     Sig: INHALE ONE OR TWO PUFFS BY MOUTH EVERY FOUR HOURS AS NEEDED FOR WHEEZING       Albuterol/Levalbuterol Refill Protocol Passed - 12/14/2020 11:31 AM        Passed - PCP or prescribing provider visit in last year     Last office visit with prescriber/PCP: 12/30/2019 Lamar Mejía MD OR same dept: 12/30/2019 Lamar Mejía MD OR same specialty: 12/30/2019 Lamar Mejía MD Last physical: Visit date not found       Next appt within 3 mo: Visit date not found  Next physical within 3 mo: Visit date not found  Prescriber OR PCP: Lamar Mejía MD  Last diagnosis associated with med order: 1. Asthma  - VENTOLIN HFA 90 mcg/actuation inhaler [Pharmacy Med Name: VENTOLIN HFA 90 MCG INHALER]; INHALE ONE OR TWO PUFFS BY MOUTH EVERY FOUR HOURS AS NEEDED FOR WHEEZING  Dispense: 18 Inhaler; Refill: 2    If protocol passes may refill for 6 months if within 3 months of last provider visit (or a total of 9 months). If patient requesting >1 inhaler per month refill x 6 months and have patient make appointment with provider.

## 2021-06-13 NOTE — PROGRESS NOTES
"Assessment and Plan:     Obesity  46 y.o. year old female in clinic today to discuss treatment of the following conditions through diet and lifestyle modification and weight loss:  1. Obesity    2. Snoring    3. Pure hypercholesterolemia    4. Vitamin D deficiency    5. Osteoarthritis Of The Knee      The patient's efforts this past month were successful as evidenced by weight loss.  I recommend that the patient focus on adding resistance training.   - continue phentermine   - continue logging food   - add resistance training.       Follow up in 1 month.  Continue supplements.    Recommend increasing movement throughout the day--sitting less, moving more.  Will increase activity over time to reach a goal of at least 150 minutes of moderate exercise each week.  Behavior modification:  Cognitive restructuring exercises, journaling stressors, triggers for food cravings.  Dietary Intervention:  Reduced calorie, reduced carbohydrates, whole food diet.  Greater than 50% of this 15 minute visit was spent in counseling regarding patient's obesity, medications, dietary, exercise, and behavior modification recommendations.      Subjective  Patient presents for treatment of chronic, comorbid conditions using intensive therapeutic lifestyle interventions including nutrition, physical activity, and behavior management.    Energy is good.  She just got back from [place].  She does not have her log with.  She is counting her calories.  Lots of walking over the past month.  Walking daily.   Water aerobics boot camp.  Arthritis is improving.  Popcorn at night.  Apple each day.    Breakfast: smoothie or eggs.  Veggies.  Sometimes omelette. Lunch is at school.  Apple, string cheese.  She has a salad with meat topping.  \"a ton of veggies.\"  Portions are smaller.  Protein: 60+.  Calories: [id number]    Are you experiencing any side effects to the medications:  No  Hunger control:  good  Exercise was discussed: yes  Taking supplements:  " yes  Discussed journaling food:  yes  Patient is pleased with the current results:  yes  The patient is following the nutrition plan:  yes  Barriers to losing weight:  Behavior:  inadequate exercise    Patient Active Problem List   Diagnosis     Asthma     Thoracic Outlet Syndrome     Nicotine Dependence - In Remission     Generalized Anxiety Disorder     Osteoarthritis Of The Knee     Vitamin D Deficiency     Pure hypercholesterolemia     Major Depression, Recurrent     Fibromyalgia     Obesity     Bulimia nervosa     Acid reflux     Venous insufficiency of both lower extremities     Symptomatic varicose veins of both lower extremities     Psoriatic arthritis     Mediastinal adenopathy     Snoring       Current Outpatient Prescriptions on File Prior to Visit   Medication Sig Dispense Refill     albuterol (VENTOLIN HFA) 90 mcg/actuation inhaler INHALE ONE OR TWO PUFFS BY MOUTH EVERY FOUR HOURS AS NEEDED FOR WHEEZING 1 Inhaler 0     ALPRAZolam (XANAX) 0.5 MG tablet TAKE ONE TABLET BY MOUTH THREE TIMES A DAY AS NEEDED FOR ANXIETY 30 tablet 0     azelastine (ASTELIN) 137 mcg (0.1 %) nasal spray USE 1 SPRAY INTO EACH NOSTRIL 2 TIMES A DAY  6     beclomethasone (QVAR) 40 mcg/actuation inhaler Inhale 1 puff 2 (two) times a day.       BIOTIN ORAL Take by mouth. 1 tablet daily       CHOLECALCIFEROL, VITAMIN D3, (VITAMIN D3 ORAL) Take 1 capsule by mouth daily.       diclofenac sodium (VOLTAREN) 1 % Gel APPLY 2-3 GRAMS 3-4 TIMES PER DAY AS NEEDED  0     DULoxetine (CYMBALTA) 20 MG capsule TAKE 1 CAPSULE (20 MG TOTAL) BY MOUTH 2 (TWO) TIMES A DAY. 180 capsule 3     FIBER, DEXTRIN, ORAL Take 1 tablet by mouth daily.       fluticasone-salmeterol (ADVAIR) 100-50 mcg/dose DISKUS Inhale 1 puff 2 (two) times a day.       HYDROcodone-acetaminophen 5-325 mg per tablet Take 1-2 tablets by mouth every 4 (four) hours as needed for pain. 15 tablet 0     hydrOXYzine (VISTARIL) 25 MG capsule TAKE 1-2 CAPSULES BY MOUTH EVERY 4-6 HOURS AS  NEEDED FOR PAIN 40 capsule 0     ibuprofen (ADVIL,MOTRIN) 200 MG tablet Take 600 mg by mouth every 6 (six) hours as needed for pain.        magnesium 200 mg Tab Take 1 tablet by mouth daily.       multivitamin (ONE A DAY) per tablet Take 1 tablet by mouth daily.       omega-3 fatty acids-vitamin E (FISH OIL) 1,000 mg cap Take 1 capsule by mouth daily.       omeprazole (PRILOSEC) 40 MG capsule TAKE ONE CAPSULE BY MOUTH ONE TIME DAILY 90 capsule 0     pravastatin (PRAVACHOL) 10 MG tablet TAKE ONE TABLET BY MOUTH ONE TIME DAILY 90 tablet 3     [DISCONTINUED] phentermine (ADIPEX-P) 37.5 mg tablet Take 1 tablet (37.5 mg total) by mouth daily. 30 tablet 0     Current Facility-Administered Medications on File Prior to Visit   Medication Dose Route Frequency Provider Last Rate Last Dose     triamcinolone acetonide 40 mg/mL injection 40 mg (KENALOG-40)  40 mg Intra-articular Once Lisette Anand MD           Objective:  Vitals:    10/12/17 1609   BP: 118/60   Pulse: 80     Initial Weight: 239 lbs  Weight: 204 lb (92.5 kg)  Weight loss from initial: 35  % Weight loss: 14.64 %  Body mass index is 32.43 kg/(m^2).  General:  Patient is alert, pleasant, no distress.  Patient is obese.    This note has been dictated using voice recognition software. Any grammatical or context distortions are unintentional and inherent to the software

## 2021-06-14 NOTE — PROGRESS NOTES
Assessment and Plan:     Obesity  47 y.o. year old female in clinic today to discuss treatment of the following conditions through diet and lifestyle modification and weight loss:  1. Obesity    2. Snoring    3. Pure hypercholesterolemia    4. Vitamin D deficiency      The patient's efforts this past month were successful as evidenced by weight loss and adherence to goals despite stress of life. .  I recommend that the patient focus on stress reduction activities.    - continue phentermine   - continue tracking   - stress reduction   - RTC in 4 weeks.      Order placed for screening mammography.    Follow up in 1 month.  Continue supplements.    Recommend increasing movement throughout the day--sitting less, moving more.  Will increase activity over time to reach a goal of at least 150 minutes of moderate exercise each week.  Behavior modification:  Cognitive restructuring exercises, journaling stressors, triggers for food cravings.  Dietary Intervention:  Reduced calorie, reduced carbohydrates, whole food diet.  Greater than 50% of this 15 minute visit was spent in counseling regarding patient's obesity, medications, dietary, exercise, and behavior modification recommendations.      Subjective  Patient presents for treatment of chronic, comorbid conditions using intensive therapeutic lifestyle interventions including nutrition, physical activity, and behavior management.    Lots of life stress.  Continues on phentermine and wonders if the feeling of stress in shoulders is the phentermine.  She continues to work-out.  Work has been respite.  [place] sick after eating at restaurant for birthday last night.  [id number] calories per day.  Has been eating on the fly.    Are you experiencing any side effects to the medications:  No  Hunger control:  good  Exercise was discussed: yes  Taking supplements:  yes  Discussed journaling food:  yes  Patient is pleased with the current results:  yes  The patient is following the  nutrition plan:  yes  Barriers to losing weight:  Social calories    Patient Active Problem List   Diagnosis     Asthma     Thoracic Outlet Syndrome     Nicotine Dependence - In Remission     Generalized Anxiety Disorder     Osteoarthritis Of The Knee     Vitamin D Deficiency     Pure hypercholesterolemia     Major Depression, Recurrent     Fibromyalgia     Obesity     Bulimia nervosa     Acid reflux     Venous insufficiency of both lower extremities     Symptomatic varicose veins of both lower extremities     Psoriatic arthritis     Mediastinal adenopathy     Snoring       Current Outpatient Prescriptions on File Prior to Visit   Medication Sig Dispense Refill     albuterol (VENTOLIN HFA) 90 mcg/actuation inhaler INHALE ONE OR TWO PUFFS BY MOUTH EVERY FOUR HOURS AS NEEDED FOR WHEEZING 1 Inhaler 0     ALPRAZolam (XANAX) 0.5 MG tablet TAKE ONE TABLET BY MOUTH THREE TIMES A DAY AS NEEDED FOR ANXIETY 30 tablet 0     azelastine (ASTELIN) 137 mcg (0.1 %) nasal spray USE 1 SPRAY INTO EACH NOSTRIL 2 TIMES A DAY  6     beclomethasone (QVAR) 40 mcg/actuation inhaler Inhale 1 puff 2 (two) times a day.       BIOTIN ORAL Take by mouth. 1 tablet daily       CHOLECALCIFEROL, VITAMIN D3, (VITAMIN D3 ORAL) Take 1 capsule by mouth daily.       diclofenac sodium (VOLTAREN) 1 % Gel APPLY 2-3 GRAMS 3-4 TIMES PER DAY AS NEEDED  0     DULoxetine (CYMBALTA) 20 MG capsule TAKE 1 CAPSULE (20 MG TOTAL) BY MOUTH 2 (TWO) TIMES A DAY. 180 capsule 3     FIBER, DEXTRIN, ORAL Take 1 tablet by mouth daily.       fluticasone-salmeterol (ADVAIR) 100-50 mcg/dose DISKUS Inhale 1 puff 2 (two) times a day.       HYDROcodone-acetaminophen 5-325 mg per tablet Take 1-2 tablets by mouth every 4 (four) hours as needed for pain. 15 tablet 0     hydrOXYzine (VISTARIL) 25 MG capsule TAKE 1-2 CAPSULES BY MOUTH EVERY 4-6 HOURS AS NEEDED FOR PAIN 40 capsule 1     ibuprofen (ADVIL,MOTRIN) 200 MG tablet Take 600 mg by mouth every 6 (six) hours as needed for pain.         magnesium 200 mg Tab Take 1 tablet by mouth daily.       multivitamin (ONE A DAY) per tablet Take 1 tablet by mouth daily.       omega-3 fatty acids-vitamin E (FISH OIL) 1,000 mg cap Take 1 capsule by mouth daily.       omeprazole (PRILOSEC) 20 MG capsule Take 1 capsule (20 mg total) by mouth 2 (two) times a day before meals. 60 capsule 0     omeprazole (PRILOSEC) 40 MG capsule TAKE ONE CAPSULE BY MOUTH ONE TIME DAILY 90 capsule 0     pravastatin (PRAVACHOL) 10 MG tablet TAKE ONE TABLET BY MOUTH ONE TIME DAILY 90 tablet 3     [DISCONTINUED] phentermine (ADIPEX-P) 37.5 mg tablet Take 1 tablet (37.5 mg total) by mouth daily. 30 tablet 0     Current Facility-Administered Medications on File Prior to Visit   Medication Dose Route Frequency Provider Last Rate Last Dose     triamcinolone acetonide 40 mg/mL injection 40 mg (KENALOG-40)  40 mg Intra-articular Once Lisette Anand MD           Objective:  Vitals:    11/10/17 1458   BP: 128/72   Pulse: 90     Initial Weight: 239 lbs  Weight: 192 lb (87.1 kg)  Weight loss from initial: 47  % Weight loss: 19.67 %  Body mass index is 30.53 kg/(m^2).  General:  Patient is alert, pleasant, no distress.  Patient is obese.    This note has been dictated using voice recognition software. Any grammatical or context distortions are unintentional and inherent to the software

## 2021-06-15 NOTE — PROGRESS NOTES
ASSESSMENT:  1. Generalized anxiety disorder  ALPRAZolam (XANAX) 0.5 MG tablet    DULoxetine (CYMBALTA) 20 MG capsule       PLAN:  Patient given refill of her Xanax today to use while we try to optimize control of her anxiety.  Duloxetine increased today to 60 mg daily.  Patient has follow-up scheduled with her primary provider in 2-3 weeks and will follow-up at that time on medication change.    No problem-specific Assessment & Plan notes found for this encounter.    The following are part of a depression follow up plan for the patient:  taking history of mental health management  There are no Patient Instructions on file for this visit.    No orders of the defined types were placed in this encounter.    Medications Discontinued During This Encounter   Medication Reason     ALPRAZolam (XANAX) 0.5 MG tablet Reorder     DULoxetine (CYMBALTA) 20 MG capsule Dose adjustment       No Follow-up on file.    CHIEF COMPLAINT:  Chief Complaint   Patient presents with     Anxiety       HISTORY OF PRESENT ILLNESS:  Sonya is a 47 y.o. female here today to discuss increased anxiety over the past approximately 3 months or so. There have been some increased life stressors with a new job on the horizon and moving her mother into assisted living which may have contributed to worsening symptoms. Sonya has tried prozac and other SSRI's in the past, but duloxetine has been working well for her for some time at 40 mg daily. She has recently needed to increase her use of xanax to  2 times a day to combat new symptoms, and is here today to discuss medication adjustment.  Anxiety is more prominent than her depressive symptoms.  She does feel that there is some depression playing a role given the level of her anxiety increase.  States that she is frustrated with herself and she has some good changes on the horizon with her new job coming up but wants to be performing optimally when she starts there.  She also notes that she does not want to  be reliant on her Xanax as much as she is currently.  No homicidal or suicidal ideations or thoughts of self-harm.    REVIEW OF SYSTEMS:      Pertinent items are noted in HPI.  All other systems are negative  PFSH:  Reviewed, no changes      TOBACCO USE:  History   Smoking Status     Former Smoker     Packs/day: 1.00     Years: 20.00   Smokeless Tobacco     Never Used     Comment: quit 2006       VITALS:  Vitals:    01/24/18 1603   BP: 116/88   Patient Site: Left Arm   Patient Position: Sitting   Cuff Size: Adult Regular   Pulse: 96   Resp: 14   Temp: 97.7  F (36.5  C)   TempSrc: Oral   Weight: 183 lb 1 oz (83 kg)     Wt Readings from Last 3 Encounters:   01/24/18 183 lb 1 oz (83 kg)   01/15/18 177 lb 3.2 oz (80.4 kg)   12/15/17 184 lb 3.2 oz (83.6 kg)       PHYSICAL EXAM:   /88 (Patient Site: Left Arm, Patient Position: Sitting, Cuff Size: Adult Regular)  Pulse 96  Temp 97.7  F (36.5  C) (Oral)   Resp 14  Wt 183 lb 1 oz (83 kg)  BMI 29.1 kg/m2  General appearance: alert, appears stated age and cooperative  Lungs: clear to auscultation bilaterally  Heart: regular rate and rhythm, S1, S2 normal, no murmur, click, rub or gallop  Psychologic: Slightly anxious and occasionally teary during interview, otherwise mood and affect normal.      DATA REVIEWED:  Additional History from Old Records Summarized (2): 2  Decision to Obtain Records (1): 0  Radiology Tests Summarized or Ordered (1): 0  Labs Reviewed or Ordered (1): 0  Medicine Test Summarized or Ordered (1): 0  Independent Review of EKG or X-RAY(2 each): 0    The visit lasted a total of 25 minutes face to face with the patient. Over 50% of the time was spent counseling and educating the patient about plan of care.    MEDICATIONS:  Current Outpatient Prescriptions   Medication Sig Dispense Refill     ALPRAZolam (XANAX) 0.5 MG tablet TAKE 1 TABLET BY MOUTH 3 TIMES A DAY AS NEEDED FOR ANXIETY 30 tablet 0     azelastine (ASTELIN) 137 mcg (0.1 %) nasal spray USE  1 SPRAY INTO EACH NOSTRIL 2 TIMES A DAY  6     beclomethasone (QVAR) 40 mcg/actuation inhaler Inhale 1 puff 2 (two) times a day. 1 Inhaler 12     BIOTIN ORAL Take by mouth. 1 tablet daily       CHOLECALCIFEROL, VITAMIN D3, (VITAMIN D3 ORAL) Take 1 capsule by mouth daily.       diclofenac sodium (VOLTAREN) 1 % Gel APPLY 2-3 GRAMS 3-4 TIMES PER DAY AS NEEDED  0     DULoxetine (CYMBALTA) 20 MG capsule TAKE 3 CAPSULE once daily(total dose 60 mg daily). 270 capsule 1     FIBER, DEXTRIN, ORAL Take 1 tablet by mouth daily.       HYDROcodone-acetaminophen 5-325 mg per tablet Take 1-2 tablets by mouth every 4 (four) hours as needed for pain. 15 tablet 0     hydrOXYzine pamoate (VISTARIL) 25 MG capsule TAKE 1-2 CAPSULES BY MOUTH EVERY 4-6 HOURS AS NEEDED FOR PAIN 40 capsule 1     ibuprofen (ADVIL,MOTRIN) 200 MG tablet Take 600 mg by mouth every 6 (six) hours as needed for pain.        magnesium 200 mg Tab Take 1 tablet by mouth daily.       montelukast (SINGULAIR) 10 mg tablet Take 1 tablet (10 mg total) by mouth at bedtime. 30 tablet 12     multivitamin (ONE A DAY) per tablet Take 1 tablet by mouth daily.       omega-3 fatty acids-vitamin E (FISH OIL) 1,000 mg cap Take 1 capsule by mouth daily.       omeprazole (PRILOSEC) 20 MG capsule Take 1 capsule (20 mg total) by mouth 2 (two) times a day before meals. 180 capsule 1     phentermine (ADIPEX-P) 37.5 mg tablet Take 1 tablet (37.5 mg total) by mouth daily. 30 tablet 0     pravastatin (PRAVACHOL) 10 MG tablet TAKE ONE TABLET BY MOUTH ONE TIME DAILY 90 tablet 3     VENTOLIN HFA 90 mcg/actuation inhaler INHALE ONE OR TWO PUFFS BY MOUTH EVERY FOUR HOURS AS NEEDED FOR WHEEZING 18 Inhaler 2     Current Facility-Administered Medications   Medication Dose Route Frequency Provider Last Rate Last Dose     triamcinolone acetonide 40 mg/mL injection 40 mg (KENALOG-40)  40 mg Intra-articular Once Lisette Anand MD           This note has been dictated using voice recognition software.  Any grammatical or context distortions are unintentional and inherent to the software

## 2021-06-15 NOTE — TELEPHONE ENCOUNTER
RN cannot approve Refill Request    RN can NOT refill this medication med is not covered by policy/route to provider. Last office visit: 12/30/2019 Lamar Mejía MD Last Physical: Visit date not found Last MTM visit: Visit date not found Last visit same specialty: 12/30/2019 Lamar Mejía MD.  Next visit within 3 mo: Visit date not found  Next physical within 3 mo: Visit date not found      Kiana Valdes, [facility] Triage/Med Refill 3/1/2021    Requested Prescriptions   Pending Prescriptions Disp Refills     montelukast (SINGULAIR) 10 mg tablet [Pharmacy Med Name: MONTELUKAST 10MG TABLET] 90 tablet 3     Sig: TAKE 1 TABLET BY MOUTH  DAILY       There is no refill protocol information for this order

## 2021-06-15 NOTE — PROGRESS NOTES
"Assessment and Plan:     Obesity  47 y.o. year old female in clinic today to discuss treatment of the following conditions through diet and lifestyle modification and weight loss:  1. Obesity    2. Snoring    3. Pure hypercholesterolemia    4. Vitamin D deficiency      The patient's efforts this past month were successful as evidenced by weight loss despite decreased focus on weight loss.  I recommend that the patient focus on resuming previously plan.    - continue phentermine (taper schedule.  Next month when stress has decreased?  She has not plateau-ed)   - planning and tracking   - get back to the gym.      Follow up in 1 month.  Continue supplements.    Recommend increasing movement throughout the day--sitting less, moving more.  Will increase activity over time to reach a goal of at least 150 minutes of moderate exercise each week.  Behavior modification:  Cognitive restructuring exercises, journaling stressors, triggers for food cravings.  Dietary Intervention:  Reduced calorie, reduced carbohydrates, whole food diet.  Greater than 50% of this 15 minute visit was spent in counseling regarding patient's obesity, medications, dietary, exercise, and behavior modification recommendations.    Subjective  Patient presents for treatment of chronic, comorbid conditions using intensive therapeutic lifestyle interventions including nutrition, physical activity, and behavior management.    \"fell of bandwagon over holidays.\"  Stopped logging.  Lots of family stress.  Portions increased.  Eating more of healthy foods.  Stopped measuring.  Not exercising as much as before.      Are you experiencing any side effects to the medications:  No  Hunger control:  good  Exercise was discussed: yes  Taking supplements:  yes  Discussed journaling food:  yes  Patient is pleased with the current results:  no  The patient is following the nutrition plan:  no  Barriers to losing weight:  Behavior:  inadequate exercise    Patient Active " Problem List   Diagnosis     Asthma     Thoracic Outlet Syndrome     Nicotine Dependence - In Remission     Generalized Anxiety Disorder     Osteoarthritis Of The Knee     Vitamin D Deficiency     Pure hypercholesterolemia     Major Depression, Recurrent     Fibromyalgia     Obesity     Bulimia nervosa     Acid reflux     Venous insufficiency of both lower extremities     Symptomatic varicose veins of both lower extremities     Psoriatic arthritis     Mediastinal adenopathy     Snoring       Current Outpatient Prescriptions on File Prior to Visit   Medication Sig Dispense Refill     ALPRAZolam (XANAX) 0.5 MG tablet TAKE 1 TABLET BY MOUTH 3 TIMES A DAY AS NEEDED FOR ANXIETY, SCHEDULE APPT WITH PRIMARY MD 30 tablet 0     azelastine (ASTELIN) 137 mcg (0.1 %) nasal spray USE 1 SPRAY INTO EACH NOSTRIL 2 TIMES A DAY  6     beclomethasone (QVAR) 40 mcg/actuation inhaler Inhale 1 puff 2 (two) times a day. 1 Inhaler 12     BIOTIN ORAL Take by mouth. 1 tablet daily       CHOLECALCIFEROL, VITAMIN D3, (VITAMIN D3 ORAL) Take 1 capsule by mouth daily.       diclofenac sodium (VOLTAREN) 1 % Gel APPLY 2-3 GRAMS 3-4 TIMES PER DAY AS NEEDED  0     DULoxetine (CYMBALTA) 20 MG capsule TAKE 1 CAPSULE (20 MG TOTAL) BY MOUTH 2 TIMES A DAY. 180 capsule 3     FIBER, DEXTRIN, ORAL Take 1 tablet by mouth daily.       HYDROcodone-acetaminophen 5-325 mg per tablet Take 1-2 tablets by mouth every 4 (four) hours as needed for pain. 15 tablet 0     hydrOXYzine pamoate (VISTARIL) 25 MG capsule TAKE 1-2 CAPSULES BY MOUTH EVERY 4-6 HOURS AS NEEDED FOR PAIN 40 capsule 1     ibuprofen (ADVIL,MOTRIN) 200 MG tablet Take 600 mg by mouth every 6 (six) hours as needed for pain.        magnesium 200 mg Tab Take 1 tablet by mouth daily.       montelukast (SINGULAIR) 10 mg tablet Take 1 tablet (10 mg total) by mouth at bedtime. 30 tablet 12     multivitamin (ONE A DAY) per tablet Take 1 tablet by mouth daily.       omega-3 fatty acids-vitamin E (FISH OIL)  1,000 mg cap Take 1 capsule by mouth daily.       omeprazole (PRILOSEC) 20 MG capsule Take 1 capsule (20 mg total) by mouth 2 (two) times a day before meals. 180 capsule 1     pravastatin (PRAVACHOL) 10 MG tablet TAKE ONE TABLET BY MOUTH ONE TIME DAILY 90 tablet 3     VENTOLIN HFA 90 mcg/actuation inhaler INHALE ONE OR TWO PUFFS BY MOUTH EVERY FOUR HOURS AS NEEDED FOR WHEEZING 18 Inhaler 2     [DISCONTINUED] phentermine (ADIPEX-P) 37.5 mg tablet Take 1 tablet (37.5 mg total) by mouth daily. 30 tablet 0     Current Facility-Administered Medications on File Prior to Visit   Medication Dose Route Frequency Provider Last Rate Last Dose     triamcinolone acetonide 40 mg/mL injection 40 mg (KENALOG-40)  40 mg Intra-articular Once Lisette Anand MD           Objective:  Vitals:    01/15/18 1607   BP: 118/72   Pulse: 90     Initial Weight: 239 lbs  Weight: 177 lb 3.2 oz (80.4 kg)  Weight loss from initial: 61.8  % Weight loss: 25.86 %  Body mass index is 28.17 kg/(m^2).  General:  Patient is alert, pleasant, no distress.  Patient is obese.    This note has been dictated using voice recognition software. Any grammatical or context distortions are unintentional and inherent to the software

## 2021-06-15 NOTE — PROGRESS NOTES
PULMONARY OUTPATIENT FOLLOW UP NOTE    Assessment:   1. PET CT (+) lung nodule and mediastinal adenopathy.    S/p EBUS / bronchoscopy  2/21/17, cytology was negative for malignancy, non necrotizing granuloma, negative stains for fungi and AFB.   Follow up Chest CT scan 5/1/2017 showed decreased size of LLL nodule and decrease mediastinal adenopathy.   Findings suggest a resolving inflammatory / infectious process.   2. Asthma mild persistent under control  Continue low dose ICS, add singulair, albuterol HFA as needed  3. Allergic rhinitis  Start singulair, ok to hold nasal steroids during the fall and winter.   4. GERD   Diet changes, avoid eating to close to bedtime, raise head of bed. PPI daily      Plan:   1. Singulair 10 mg daily   2. Qvar 40 mcg one puff BID , rinse your mouth with water after each use  3. Raise the head of the bed  4. Avoid eating close to bed time, at least 3 hours  5. Continue omeprazole daily, trial of ranitidine  6. Follow up in one year     Ric Nielson  Pulmonary / Critical Care  12/14/2017    CC:      Chief Complaint   Patient presents with     Follow Up     1 year f/u-pt has no issues of her breathing     HPI:       Sonya Mosqueda is an 47 y.o. female who presents for follow up.   Patient has history of asthma, GERD, hyperlipidemia, anxiety disorder.   Patient was previously evaluated for LLL nodule and mediastinal adenopathy.  PET CT scan showed increase uptake LLL nodule and mediastinal adenopathy.  S/p EBUS / bronchoscopy  2/21/17, cytology was negative for malignancy, non necrotizing granuloma, negative stains for fungi and AFB.   Follow up Chest CT scan 5/1/2017 showed decreased size of LLL nodule and decrease mediastinal adenopathy. Findings suggest a resolving inflammatory / infectious process.      Reports doing ok, asthma symptoms are under control, previously on allergy shots until November 2016, uses ICS mostly daily.   Denies runny nose or postnasal drip.    Acid reflux symptoms are under control on PPI daily.   Previous tobacco user 1ppd for 20 years, quit 10 years ago.    Patient is a teacher, middle school.   Enjoys outdoors activities, organic garden at the school. Has a dog.     Past Medical History:   Diagnosis Date     Anxiety disorder      Endometriosis     Created by Conversion  Replacement Utility updated for latest IMO load     Fibromyalgia      GERD (gastroesophageal reflux disease)      HLD (hyperlipidemia)      Nephrolithiasis     Created by Conversion      Spontaneous      Created by Conversion  Replacement Utility updated for latest IMO load     Medications:     Prior to Admission medications    Medication Sig Start Date End Date Taking? Authorizing Provider   ALPRAZolam (XANAX) 0.5 MG tablet TAKE ONE TABLET BY MOUTH THREE TIMES A DAY AS NEEDED FOR ANXIETY 16  Yes Reina Soler MD   ALPRAZolam (XANAX) 0.5 MG tablet Take 1 tablet (0.5 mg total) by mouth bedtime as needed for sleep. 1/4/17 2/3/17 Yes Reina Soler MD   beclomethasone (QVAR) 40 mcg/actuation inhaler Inhale 1 puff 2 (two) times a day.   Yes PROVIDER, HISTORICAL   CHOLECALCIFEROL, VITAMIN D3, (VITAMIN D3 ORAL) Take 1 capsule by mouth daily.   Yes PROVIDER, HISTORICAL   DULoxetine (CYMBALTA) 20 MG capsule TAKE 1 CAPSULE (20 MG TOTAL) BY MOUTH 2 (TWO) TIMES A DAY. 16  Yes Reina Soler MD   FIBER, DEXTRIN, ORAL Take 1 tablet by mouth daily.   Yes PROVIDER, HISTORICAL   fluticasone-salmeterol (ADVAIR) 100-50 mcg/dose DISKUS Inhale 1 puff 2 (two) times a day.   Yes PROVIDER, HISTORICAL   HYDROcodone-acetaminophen 5-325 mg per tablet Take 1-2 tablets by mouth every 4 (four) hours as needed for pain. 16  Yes Victorina Osman,    hydrOXYzine (VISTARIL) 25 MG capsule TAKE 1-2 CAPSULES BY MOUTH EVERY 4-6 HOURS AS NEEDED FOR PAIN 12/15/16  Yes Reina Soler MD   ibuprofen (ADVIL,MOTRIN) 200 MG tablet Take 600 mg by mouth every 6 (six) hours as needed for pain.     Yes PROVIDER, HISTORICAL   LACTOBACILLUS ACIDOPHILUS (ACIDOPHILUS ORAL) Take 1 capsule by mouth daily.   Yes Reina Soler MD   magnesium 200 mg Tab Take 1 tablet by mouth daily.   Yes PROVIDER, HISTORICAL   meloxicam (MOBIC) 15 MG tablet Take 1 tablet (15 mg total) by mouth daily. 12/22/16  Yes Lisette Anand MD   multivitamin (ONE A DAY) per tablet Take 1 tablet by mouth daily.   Yes PROVIDER, HISTORICAL   omega-3 fatty acids-vitamin E (FISH OIL) 1,000 mg cap Take 1 capsule by mouth daily.   Yes PROVIDER, HISTORICAL   omeprazole (PRILOSEC) 20 MG capsule TAKE ONE CAPSULE BY MOUTH ONE TIME DAILY 10/9/16  Yes Reina Soler MD   pravastatin (PRAVACHOL) 10 MG tablet TAKE ONE TABLET BY MOUTH ONE TIME DAILY 12/4/16  Yes Reina Soler MD   VENTOLIN HFA 90 mcg/actuation inhaler INHALE ONE OR TWO PUFFS BY MOUTH EVERY FOUR HOURS AS NEEDED FOR WHEEZING 10/24/16  Yes Reina Soler MD     Social History     Social History     Marital status:      Spouse name: N/A     Number of children: N/A     Years of education: N/A     Occupational History     Not on file.     Social History Main Topics     Smoking status: Former Smoker     Packs/day: 1.00     Years: 20.00     Smokeless tobacco: Never Used      Comment: quit 2006     Alcohol use No     Drug use: Not on file     Sexual activity: Not on file     Other Topics Concern     Not on file     Social History Narrative     Family History   Problem Relation Age of Onset     Breast cancer Paternal Grandmother 60     Stroke Mother      Heart attack Mother      Coronary artery disease Mother      Hypertension Mother      Atrial fibrillation Father      Review of Systems  A 12 point comprehensive review of systems was negative except as noted.        Objective:     Vitals:    12/15/17 1621   BP: 106/60   Pulse: 89   Resp: 16   SpO2: 100%   Weight: 184 lb 3.2 oz (83.6 kg)     Gen: awake, alert, no distress  HEENT: pink conjunctiva, moist mucosa, Mallampati II/IV  Neck: no  thyromegaly, masses or JVD  Lungs: clear  CV: regular, no murmurs or gallops appreciated  Abdomen: soft, NT, BS wnl  Ext: no edema  Neuro: CN II-XII intact, non focal      Diagnostic tests:     XR CHEST PA AND LATERAL 12/12/2016 4:45 PM   INDICATION: Chest Pain COMPARISON: 3/2/2016 FINDINGS: On the PA view, there is a poorly defined 1 cm nodule lateral to the left hilum overlying the anterior left fourth rib. This is not apparent on the lateral view. Suggest a nonemergent chest CT for further evaluation. Right lung is clear. Heart and pulmonary vascularity are normal. No effusion.     CT CHEST WO CONTRAST 12/28/2016 6:12 PM   INDICATION: Nodule on chest x-ray. TECHNIQUE: Routine chest. Dose reduction techniques were used. IV CONTRAST: None. COMPARISON: Chest x-ray 12/12/2016. Chest CT 9/6/2011. FINDINGS: LUNGS AND PLEURA: 1.5 cm peripheral nodule in the superior segment of the left lower lobe corresponds to the abnormality on chest x-ray. There are smaller additional satellite nodules. Findings are new compared to the old chest CT. The lungs are otherwise clear. MEDIASTINUM: Left hilar lymphadenopathy. Small paratracheal and aortopulmonic window lymph nodes. No coronary artery calcification. LIMITED UPPER ABDOMEN: Negative. MUSCULOSKELETAL: Negative.   CONCLUSION: 1.  1.5 cm peripheral pulmonary nodule superior segment left lower lobe with some small adjacent satellite nodules, and left hilar lymphadenopathy. Findings are indeterminate for an infectious versus neoplastic process. Consider pulmonary consultation.    PET FDG/CT 2/15/2017 3:24 PM   INDICATION: Pulmonary nodule  COMPARISON: CT from 02/13/2017 is reviewed.  HEAD AND NECK: Negative.  CHEST: 1.5 x 0.9 cm pulmonary nodule in the superior segment left lower lobe is anatomically unchanged in the short interval since 02/13/2017 and mildly FDG avid (SUVmax 3.7). FDG avid left interlobar station 11L, left hilar station 10L, subcarinal   station 7, and lower left  paratracheal station 4L lymphadenopathy. The station 7 node, as an example, measures 1.6 x 0.9 cm (SUVmax 3.1).  ABDOMEN/PELVIS: No abnormal FDG activity. Physiologic FDG activity in the ovaries. Negative adrenals. Pelvic phleboliths.  MUSCULOSKELETAL: Moderate degenerative change in the cervical spine and lumbosacral interspace.  CONCLUSION:  Findings suspicious for left lower lobe lung cancer with left hilar and mediastinal lymph node metastases. Recommend endobronchial ultrasound-guided lymph node biopsy.    CT CHEST W CONTRAST 5/1/2017 3:47 PM  INDICATION: Enlarged lymph nodes.  COMPARISON: 12/28/2016, 02/15/2017.  FINDINGS:  LUNGS AND PLEURA: 1.2 x 0.8 cm peripheral pulmonary nodule in the left lower lobe has decreased in size from previous 1.5 x 0.9 cm. Small adjacent satellite nodules again seen. The lungs are otherwise clear.  MEDIASTINUM: Small left hilar lymph nodes appear slightly decreased when compared to previous. There are no hilar or mediastinal lymph nodes that are greater than 1 cm.  LIMITED UPPER ABDOMEN: Negative.  MUSCULOSKELETAL: Negative.  CONCLUSION:  1.  Left lower lobe pulmonary nodule has decreased slightly in size when compared to previous. Left hilar lymphadenopathy also appears mildly decreased. Findings suggest a resolving inflammatory/infectious process. Consider additional follow-up in 3-6   months.

## 2021-06-15 NOTE — TELEPHONE ENCOUNTER
Refill Approved    Rx renewed per Medication Renewal Policy. Medication was last renewed on 7/1/20.    Jonny Cox, [facility] Triage/Med Refill 2/17/2021     Requested Prescriptions   Pending Prescriptions Disp Refills     DULoxetine (CYMBALTA) 20 MG capsule [Pharmacy Med Name: DULOXETINE  20MG  CAP] 180 capsule 3     Sig: TAKE 1 CAPSULE BY MOUTH  TWICE DAILY       Tricyclics/Misc Antidepressant/Antianxiety Meds Refill Protocol Passed - 2/17/2021  6:15 AM        Passed - PCP or prescribing provider visit in last year     Last office visit with prescriber/PCP: 12/30/2019 Lamar Mejía MD OR same dept: Visit date not found OR same specialty: 12/30/2019 Lamar Mejía MD  Last physical: Visit date not found Last MTM visit: Visit date not found   Next visit within 3 mo: Visit date not found  Next physical within 3 mo: Visit date not found  Prescriber OR PCP: Lamar Mejía MD  Last diagnosis associated with med order: 1. STEVE (generalized anxiety disorder)  - DULoxetine (CYMBALTA) 20 MG capsule [Pharmacy Med Name: DULOXETINE  20MG  CAP]; TAKE 1 CAPSULE BY MOUTH  TWICE DAILY  Dispense: 180 capsule; Refill: 3    If protocol passes may refill for 12 months if within 3 months of last provider visit (or a total of 15 months).

## 2021-06-16 NOTE — PROGRESS NOTES
"Assessment & Plan:  1. Generalized anxiety disorder  Patient with a history of anxiety.  We recently increased her Cymbalta to 60 mg.  Her STEVE 7 has decreased from 20-14 on this dose.  She states she feels better but is under a lot of stress.  Will continue at this dose as it appears to be working better.  Stressed the importance for her to try other ways to deal with her stress such as exercise.  Discussed next step would be adding BuSpar    2. Fibromyalgia  Patient with history of fibromyalgia.  It is doing better since she is working on her weight loss is still on her weight loss clinic.  Continue with exercise to keep her fibromyalgia under control      No orders of the defined types were placed in this encounter.    There are no discontinued medications.    No Follow-up on file.    The following are part of a depression follow up plan for the patient:  under care of mental health counselor  The following high BMI interventions were performed this visit: encouragement to exercise and lifestyle education regarding diet    Chief Complaint:   Chief Complaint   Patient presents with     Anxiety     Medication check, increased Cymbalta to 60 mg about 4 weeks ago        History of Present Illness:  Sonya is a 47 y.o. female presenting to the clinic today for anxiety.  She was seen on 1/24/18 and increased Cymbalta due to increased stress and anxiety. She has recently started a new job and is feeling overwhelmed with the learning curve of the position. She has been falling back into bad eating habits due to stress. She has been going to the [facility] and has a . She continues to be seen by the [facility]. She notes her work hours are limiting. She notes increasing Cymbalta has not helped. She continues to feel \"on edge\" about things, and is navas and irritated. She denies problems sleeping. She is using hydroxyzine and ibuprofen. She has been using alprazolam and has been taking it more frequently, but makes " her tired. She has discussed Buspar during her last visit but wanted to try increasing Cymbalta first.     Review of Systems:  She has lost weight at the [facility]. She continues to have psoriasis but treats with cream. All other systems are negative.     PFSH:  Her best friend was recently diagnosed with breast cancer.     Tobacco Use:  History   Smoking Status     Former Smoker     Packs/day: 1.00     Years: 20.00   Smokeless Tobacco     Never Used     Comment: quit 2006       Vitals:  Vitals:    02/28/18 1332   BP: 128/80   Patient Site: Right Arm   Patient Position: Sitting   Cuff Size: Adult Regular   Pulse: 88   Resp: 16   Temp: 98.3  F (36.8  C)   TempSrc: Oral   Weight: 185 lb (83.9 kg)     Wt Readings from Last 3 Encounters:   03/07/18 183 lb 5 oz (83.2 kg)   02/28/18 185 lb (83.9 kg)   01/24/18 183 lb 1 oz (83 kg)     Body mass index is 29.41 kg/(m^2).      Physical Exam:  Constitutional:  Reveals an alert, cooperative, pleasant female in no acute distress.  Vitals:  Per nursing notes.  Cardiac:  Regular rate and rhythm without murmurs, rubs, or gallops.   Lungs: Clear.  Respiratory effort normal.  Neurologic:  No gross focal deficits.    Psychiatric:  Mood appropriate, memory intact.     Data Reviewed:  Additional history summarized (from old records or history from someone other than the patient or another healthcare provider) (2 TOTAL): Reviewed note from 1/24/18 regarding anxiety and increase in Cymbalta.     Decision to obtain extra information (old records requested or history from another person or accessing Care Everywhere) (1 TOTAL): None.     Radiology tests summarized or ordered (XRAY/CT/MRI/DXA) (1 TOTAL): None.    Labs reviewed or ordered (1 TOTAL): None.    Medicine tests summarized or ordered (EKG/ECHO) (1 TOTAL): None.    Independent review of EKG or X-Ray (2 EACH): None.       The visit lasted a total of 14 minutes face to face with the patient. Over 50% of the time was  spent counseling and educating the patient about medication management.    I, Maria Guadalupe Donahue, am scribing for and in the presence of, Dr. Soler.    I, Reina Soler MD, personally performed the services described in this documentation, as scribed by Maria Guadalupe Donahue in my presence, and it is both accurate and complete.    Medications:  Current Outpatient Prescriptions   Medication Sig Dispense Refill     azelastine (ASTELIN) 137 mcg (0.1 %) nasal spray USE 1 SPRAY INTO EACH NOSTRIL 2 TIMES A DAY  6     beclomethasone (QVAR) 40 mcg/actuation inhaler Inhale 1 puff 2 (two) times a day. 1 Inhaler 12     BIOTIN ORAL Take by mouth. 1 tablet daily       CHOLECALCIFEROL, VITAMIN D3, (VITAMIN D3 ORAL) Take 1 capsule by mouth daily.       diclofenac sodium (VOLTAREN) 1 % Gel APPLY 2-3 GRAMS 3-4 TIMES PER DAY AS NEEDED  0     DULoxetine (CYMBALTA) 20 MG capsule TAKE 3 CAPSULE once daily(total dose 60 mg daily). 270 capsule 1     FIBER, DEXTRIN, ORAL Take 1 tablet by mouth daily.       HYDROcodone-acetaminophen 5-325 mg per tablet Take 1-2 tablets by mouth every 4 (four) hours as needed for pain. 15 tablet 0     hydrOXYzine pamoate (VISTARIL) 25 MG capsule TAKE 1-2 CAPSULES BY MOUTH EVERY 4-6 HOURS AS NEEDED FOR PAIN 40 capsule 1     ibuprofen (ADVIL,MOTRIN) 200 MG tablet Take 600 mg by mouth every 6 (six) hours as needed for pain.        magnesium 200 mg Tab Take 1 tablet by mouth daily.       montelukast (SINGULAIR) 10 mg tablet Take 1 tablet (10 mg total) by mouth at bedtime. 30 tablet 12     multivitamin (ONE A DAY) per tablet Take 1 tablet by mouth daily.       omega-3 fatty acids-vitamin E (FISH OIL) 1,000 mg cap Take 1 capsule by mouth daily.       omeprazole (PRILOSEC) 20 MG capsule Take 1 capsule (20 mg total) by mouth 2 (two) times a day before meals. 180 capsule 1     phentermine (ADIPEX-P) 37.5 mg tablet Take 1 tablet (37.5 mg total) by mouth daily. 30 tablet 0     pravastatin (PRAVACHOL) 10 MG tablet TAKE ONE TABLET BY  MOUTH ONE TIME DAILY 90 tablet 3     VENTOLIN HFA 90 mcg/actuation inhaler INHALE ONE OR TWO PUFFS BY MOUTH EVERY FOUR HOURS AS NEEDED FOR WHEEZING 18 Inhaler 2     ALPRAZolam (XANAX) 0.5 MG tablet TAKE 1 TABLET BY MOUTH 3 TIMES A DAY AS NEEDED FOR ANXIETY 30 tablet 0     busPIRone (BUSPAR) 5 MG tablet Start 1/2 tab by mouth twice daily.  If no improvement increase to 1 tab twice daily 60 tablet 3     estradiol (ESTRACE) 0.01 % (0.1 mg/gram) vaginal cream Insert 1 g into the vagina daily. 42.5 g 1     Current Facility-Administered Medications   Medication Dose Route Frequency Provider Last Rate Last Dose     triamcinolone acetonide 40 mg/mL injection 40 mg (KENALOG-40)  40 mg Intra-articular Once Lisette Anand MD           Total Data Points: 2

## 2021-06-16 NOTE — PROGRESS NOTES
ASSESSMENT:  1. Vaginal dryness  estradiol (ESTRACE) 0.01 % (0.1 mg/gram) vaginal cream   2. Hot flashes due to menopause         PLAN:  Patient here today to discuss hot flashes and vaginal dryness, menopausal symptoms.  These have been worse for approximately 1-2 weeks and she had not thought to mention them at her previous visit as she was hoping they are going to subside.  Vaginal dryness is getting worse and she would like to try creams if those are available.  Prescribed today for her to give a try, discussed use.  Given that she has been using some medications to control her anxiety recently that were newly started, we did discuss potential for change to something that would be more suitable for control of menopausal symptoms such as venlafaxine, however will have her trial over-the-counter Estroven or similar product as a first step.  She can follow-up with myself or Dr. Soler regarding this after few weeks depending on symptom management.  We did go through adverse effects and potential interactions of the herbal medication and discussed that it should be okay to try with her other medications, if she notices any side effects please let us know.    No problem-specific Assessment & Plan notes found for this encounter.      There are no Patient Instructions on file for this visit.    No orders of the defined types were placed in this encounter.    There are no discontinued medications.    No Follow-up on file.    CHIEF COMPLAINT:  Chief Complaint   Patient presents with     Hot Flashes     pt having hot flashes, cold hands/feet, excessive sweating at night, increased anxiety, vaginal dryness x 1.5 months       HISTORY OF PRESENT ILLNESS:  Sonya is a 47 y.o. female here today discuss menopausal symptoms.  Over the past week or 2 she has had increasing hot flashes at night.  She will have cold hands and feet, very excessive sweating at night and increased anxiety.  She was recently started on BuSpar by   [place] and she is unsure if this is working optimally yet as it has not been a long enough period of time.  She also takes Cymbalta for chronic pain which was recently increased.  Overall she feels anxiety is slightly improved but is now wondering if it may be related in part to menopausal symptoms.  Patient has a hysterectomy history and so did not have any menstrual periods subsequently, they had left one ovary so that she can go through natural menopause.  Patient had fairly abrupt onset of hot flashes which have now become severe in the night and are affecting her sleep.  She also notes that in the past 1-1/2 months she has had increased vaginal dryness, causing her problems not only with intercourse but also being painful and irritating during the day and walking around.  Wondering what next steps are as far as treatment of hot flashes and vaginal symptoms.    REVIEW OF SYSTEMS:      Pertinent items are noted in HPI.  All other systems are negative  PFSH:  Reviewed, no changes      TOBACCO USE:  History   Smoking Status     Former Smoker     Packs/day: 1.00     Years: 20.00   Smokeless Tobacco     Never Used     Comment: quit 2006       VITALS:  Vitals:    03/07/18 1628   BP: 118/82   Patient Site: Right Arm   Patient Position: Sitting   Cuff Size: Adult Large   Pulse: 72   Resp: 16   Temp: 97.9  F (36.6  C)   TempSrc: Oral   Weight: 183 lb 5 oz (83.2 kg)     Wt Readings from Last 3 Encounters:   03/07/18 183 lb 5 oz (83.2 kg)   02/28/18 185 lb (83.9 kg)   01/24/18 183 lb 1 oz (83 kg)       PHYSICAL EXAM:   /82 (Patient Site: Right Arm, Patient Position: Sitting, Cuff Size: Adult Large)  Pulse 72  Temp 97.9  F (36.6  C) (Oral)   Resp 16  Wt 183 lb 5 oz (83.2 kg)  BMI 29.14 kg/m2  General appearance: alert, appears stated age and cooperative  Psychologic: Mood and affect normal.      DATA REVIEWED:  Additional History from Old Records Summarized (2): 2  Decision to Obtain Records (1): 0  Radiology  Tests Summarized or Ordered (1): 0  Labs Reviewed or Ordered (1): 0  Medicine Test Summarized or Ordered (1): 0  Independent Review of EKG or X-RAY(2 each): 0    The visit lasted a total of 20 minutes face to face with the patient. Over 50% of the time was spent counseling and educating the patient about plan of care.    MEDICATIONS:  Current Outpatient Prescriptions   Medication Sig Dispense Refill     ALPRAZolam (XANAX) 0.5 MG tablet TAKE 1 TABLET BY MOUTH 3 TIMES A DAY AS NEEDED FOR ANXIETY 30 tablet 0     azelastine (ASTELIN) 137 mcg (0.1 %) nasal spray USE 1 SPRAY INTO EACH NOSTRIL 2 TIMES A DAY  6     beclomethasone (QVAR) 40 mcg/actuation inhaler Inhale 1 puff 2 (two) times a day. 1 Inhaler 12     BIOTIN ORAL Take by mouth. 1 tablet daily       busPIRone (BUSPAR) 5 MG tablet Start 1/2 tab by mouth twice daily.  If no improvement increase to 1 tab twice daily 60 tablet 3     CHOLECALCIFEROL, VITAMIN D3, (VITAMIN D3 ORAL) Take 1 capsule by mouth daily.       diclofenac sodium (VOLTAREN) 1 % Gel APPLY 2-3 GRAMS 3-4 TIMES PER DAY AS NEEDED  0     DULoxetine (CYMBALTA) 20 MG capsule TAKE 3 CAPSULE once daily(total dose 60 mg daily). 270 capsule 1     FIBER, DEXTRIN, ORAL Take 1 tablet by mouth daily.       HYDROcodone-acetaminophen 5-325 mg per tablet Take 1-2 tablets by mouth every 4 (four) hours as needed for pain. 15 tablet 0     hydrOXYzine pamoate (VISTARIL) 25 MG capsule TAKE 1-2 CAPSULES BY MOUTH EVERY 4-6 HOURS AS NEEDED FOR PAIN 40 capsule 1     ibuprofen (ADVIL,MOTRIN) 200 MG tablet Take 600 mg by mouth every 6 (six) hours as needed for pain.        magnesium 200 mg Tab Take 1 tablet by mouth daily.       montelukast (SINGULAIR) 10 mg tablet Take 1 tablet (10 mg total) by mouth at bedtime. 30 tablet 12     multivitamin (ONE A DAY) per tablet Take 1 tablet by mouth daily.       omega-3 fatty acids-vitamin E (FISH OIL) 1,000 mg cap Take 1 capsule by mouth daily.       omeprazole (PRILOSEC) 20 MG capsule  Take 1 capsule (20 mg total) by mouth 2 (two) times a day before meals. 180 capsule 1     phentermine (ADIPEX-P) 37.5 mg tablet Take 1 tablet (37.5 mg total) by mouth daily. 30 tablet 0     pravastatin (PRAVACHOL) 10 MG tablet TAKE ONE TABLET BY MOUTH ONE TIME DAILY 90 tablet 3     VENTOLIN HFA 90 mcg/actuation inhaler INHALE ONE OR TWO PUFFS BY MOUTH EVERY FOUR HOURS AS NEEDED FOR WHEEZING 18 Inhaler 2     estradiol (ESTRACE) 0.01 % (0.1 mg/gram) vaginal cream Insert 1 g into the vagina daily. 42.5 g 1     Current Facility-Administered Medications   Medication Dose Route Frequency Provider Last Rate Last Dose     triamcinolone acetonide 40 mg/mL injection 40 mg (KENALOG-40)  40 mg Intra-articular Once Lisette Anand MD           This note has been dictated using voice recognition software. Any grammatical or context distortions are unintentional and inherent to the software

## 2021-06-18 NOTE — PROGRESS NOTES
Assessment and Plan:     Obesity  47 y.o. year old female in clinic today to discuss treatment of the following conditions through diet and lifestyle modification and weight loss:  1. Obesity    2. Snoring    3. Pure hypercholesterolemia    4. Vitamin D deficiency      This patient returns after a 4 month absence to resume her efforts at weight loss.  She get a new job and lost focus.  She abruptly stopped phentermine.  Consequently, she gained 20+ pounds.  She is still down 40 pounds since starting the program.  She is motivated by desire to remain healthy.  We discussed dietary goals.  We also discussed the risks and benefits of resuming phentermine.  There is no contra indication.  We will resume phentermine and just plan is appropriate.  I suggested that she gain additional support through a ways to wellness package focusing on .  Follow-up in 1 month.    Follow up in 1 month.  Continue supplements.    Recommend increasing movement throughout the day--sitting less, moving more.  Will increase activity over time to reach a goal of at least 150 minutes of moderate exercise each week.  Behavior modification:  Cognitive restructuring exercises, journaling stressors, triggers for food cravings.  Dietary Intervention:  Reduced calorie, reduced carbohydrates, whole food diet.  Greater than 50% of this 15 minute visit was spent in counseling regarding patient's obesity, medications, dietary, exercise, and behavior modification recommendations.    Subjective  Patient presents for treatment of chronic, comorbid conditions using intensive therapeutic lifestyle interventions including nutrition, physical activity, and behavior management.   - successes: new job that she likes.   - struggles: new job.  Lots of new responsibilities.  She flare or shingles in her face.  This resulted in flare of arthritis.  Prednisone.  - exercise plan: working with .  Planning to restart.  She has started walking a bit.   -  tracking/journaling: yes   - following nutritional plan:    - hunger: poorly controlled.   - medication side effects: NA   - Barriers to losing weight:  Behavior:  social calories    Patient Active Problem List   Diagnosis     Asthma     Thoracic Outlet Syndrome     Nicotine Dependence - In Remission     Generalized Anxiety Disorder     Osteoarthritis Of The Knee     Vitamin D Deficiency     Pure hypercholesterolemia     Major Depression, Recurrent     Fibromyalgia     Obesity     Bulimia nervosa     Acid reflux     Venous insufficiency of both lower extremities     Symptomatic varicose veins of both lower extremities     Psoriatic arthritis     Mediastinal adenopathy     Snoring       Current Outpatient Prescriptions on File Prior to Visit   Medication Sig Dispense Refill     ALPRAZolam (XANAX) 0.5 MG tablet TAKE 1 TABLET BY MOUTH 3 TIMES A DAY AS NEEDED FOR ANXIETY 30 tablet 0     azelastine (ASTELIN) 137 mcg (0.1 %) nasal spray USE 1 SPRAY INTO EACH NOSTRIL 2 TIMES A DAY  6     beclomethasone (QVAR) 40 mcg/actuation inhaler Inhale 1 puff 2 (two) times a day. 1 Inhaler 12     BIOTIN ORAL Take by mouth. 1 tablet daily       CHOLECALCIFEROL, VITAMIN D3, (VITAMIN D3 ORAL) Take 1 capsule by mouth daily.       diclofenac sodium (VOLTAREN) 1 % Gel APPLY 2-3 GRAMS 3-4 TIMES PER DAY AS NEEDED  0     FIBER, DEXTRIN, ORAL Take 1 tablet by mouth daily.       HYDROcodone-acetaminophen 5-325 mg per tablet Take 1-2 tablets by mouth every 4 (four) hours as needed for pain. 15 tablet 0     hydrOXYzine pamoate (VISTARIL) 25 MG capsule Take 1-2 capsules (25-50 mg total) by mouth every 4 (four) hours as needed for other (every 4 to 6 hours as needed for pain). 40 capsule 0     ibuprofen (ADVIL,MOTRIN) 200 MG tablet Take 600 mg by mouth every 6 (six) hours as needed for pain.        magnesium 200 mg Tab Take 1 tablet by mouth daily.       montelukast (SINGULAIR) 10 mg tablet Take 1 tablet (10 mg total) by mouth at bedtime. 30 tablet  12     multivitamin (ONE A DAY) per tablet Take 1 tablet by mouth daily.       omega-3 fatty acids-vitamin E (FISH OIL) 1,000 mg cap Take 1 capsule by mouth daily.       omeprazole (PRILOSEC) 20 MG capsule Take 1 capsule (20 mg total) by mouth 2 (two) times a day before meals. 180 capsule 1     VENTOLIN HFA 90 mcg/actuation inhaler INHALE ONE OR TWO PUFFS BY MOUTH EVERY FOUR HOURS AS NEEDED FOR WHEEZING 18 Inhaler 2     [DISCONTINUED] DULoxetine (CYMBALTA) 20 MG capsule TAKE 3 CAPSULE once daily(total dose 60 mg daily). (Patient taking differently: Take 60 mg by mouth daily. TAKE 3 CAPSULE once daily(total dose 60 mg daily).) 270 capsule 1     [DISCONTINUED] busPIRone (BUSPAR) 5 MG tablet Start 1/2 tab by mouth twice daily.  If no improvement increase to 1 tab twice daily 60 tablet 3     [DISCONTINUED] estradiol (ESTRACE) 0.01 % (0.1 mg/gram) vaginal cream Insert 1 g into the vagina daily. 42.5 g 1     [DISCONTINUED] phentermine (ADIPEX-P) 37.5 mg tablet Take 1 tablet (37.5 mg total) by mouth daily. 30 tablet 0     [DISCONTINUED] pravastatin (PRAVACHOL) 10 MG tablet TAKE ONE TABLET BY MOUTH ONE TIME DAILY 90 tablet 3     Current Facility-Administered Medications on File Prior to Visit   Medication Dose Route Frequency Provider Last Rate Last Dose     triamcinolone acetonide 40 mg/mL injection 40 mg (KENALOG-40)  40 mg Intra-articular Once Lisette Anand MD           Objective:  Vitals:    05/25/18 1545   BP: 128/80   Pulse: 76     Initial Weight: 239 lbs  Weight: 199 lb (90.3 kg)  Weight loss from initial: 40  % Weight loss: 16.74 %  Body mass index is 31.64 kg/(m^2).  No LMP recorded. Patient has had a hysterectomy.  General:  Patient is alert, pleasant, no distress.  Patient is obese.    This note has been dictated using voice recognition software. Any grammatical or context distortions are unintentional and inherent to the software

## 2021-06-19 NOTE — PROGRESS NOTES
Assessment and Plan:     Obesity  47 y.o. year old female in clinic today to discuss treatment of the following conditions through diet and lifestyle modification and weight loss:  1. Obesity    2. Snoring    3. Pure hypercholesterolemia    4. Vitamin D deficiency      The patient's weight loss result since the last visit was successful as evidenced by weight loss and establishment of new routine.      -continue phentermine.  We discussed dietary goals.  The patient is consuming adequate nutrition. or  -Reviewed nutritional in physical activity support.  Encourage the patient to make his/her appointments with [facility] to wellness.        Follow up in 1 month.  Continue supplements.    Recommend increasing movement throughout the day--sitting less, moving more.  Will increase activity over time to reach a goal of at least 150 minutes of moderate exercise each week.  Behavior modification:  Cognitive restructuring exercises, journaling stressors, triggers for food cravings.  Dietary Intervention:  Reduced calorie, reduced carbohydrates, whole food diet.  Greater than 50% of this 15 minute visit was spent in counseling regarding patient's obesity, medications, dietary, exercise, and behavior modification recommendations.    Subjective  Patient presents for treatment of chronic, comorbid conditions using intensive therapeutic lifestyle interventions including nutrition, physical activity, and behavior management.   - successes: weight change has been slower.  She is tracking to 6815-2020 calories.  Hunger is well controlled.  Energy has been fine.    - struggles: work has been stressful.     - exercise plan: less than before.  She is not walking.  She identifies that she needs to do more exercise.  She knows that it is a stress reduction.    - tracking/journaling: yes   - following nutritional plan: yes.  Deviations from plan: infrequent   - hunger: controlled.   - medication side effects: none   - Barriers to losing  weight:  Behavior:  inadequate exercise    Patient Active Problem List   Diagnosis     Asthma     Thoracic Outlet Syndrome     Nicotine Dependence - In Remission     Generalized Anxiety Disorder     Osteoarthritis Of The Knee     Vitamin D Deficiency     Pure hypercholesterolemia     Major Depression, Recurrent     Fibromyalgia     Obesity     Bulimia nervosa     Acid reflux     Venous insufficiency of both lower extremities     Symptomatic varicose veins of both lower extremities     Psoriatic arthritis (H)     Mediastinal adenopathy     Snoring       Current Outpatient Prescriptions on File Prior to Visit   Medication Sig Dispense Refill     ALPRAZolam (XANAX) 0.5 MG tablet TAKE 1 TABLET BY MOUTH 3 TIMES A DAY AS NEEDED FOR ANXIETY 30 tablet 0     azelastine (ASTELIN) 137 mcg (0.1 %) nasal spray USE 1 SPRAY INTO EACH NOSTRIL 2 TIMES A DAY  6     beclomethasone (QVAR) 40 mcg/actuation inhaler Inhale 1 puff 2 (two) times a day. 1 Inhaler 12     BIOTIN ORAL Take by mouth. 1 tablet daily       CHOLECALCIFEROL, VITAMIN D3, (VITAMIN D3 ORAL) Take 1 capsule by mouth daily.       diclofenac sodium (VOLTAREN) 1 % Gel APPLY 2-3 GRAMS 3-4 TIMES PER DAY AS NEEDED  0     DULoxetine (CYMBALTA) 20 MG capsule Take 1 capsule (20 mg total) by mouth 2 (two) times a day. 180 capsule 1     FIBER, DEXTRIN, ORAL Take 1 tablet by mouth daily.       HYDROcodone-acetaminophen 5-325 mg per tablet Take 1-2 tablets by mouth every 4 (four) hours as needed for pain. 15 tablet 0     hydrOXYzine pamoate (VISTARIL) 25 MG capsule TAKE 1-2 CAPS BY MOUTH EVERY 4-6 HOURS AS NEEDED FOR PAIN 40 capsule 6     ibuprofen (ADVIL,MOTRIN) 200 MG tablet Take 600 mg by mouth every 6 (six) hours as needed for pain.        magnesium 200 mg Tab Take 1 tablet by mouth daily.       montelukast (SINGULAIR) 10 mg tablet Take 1 tablet (10 mg total) by mouth at bedtime. 30 tablet 12     multivitamin (ONE A DAY) per tablet Take 1 tablet by mouth daily.       omega-3  fatty acids-vitamin E (FISH OIL) 1,000 mg cap Take 1 capsule by mouth daily.       omeprazole (PRILOSEC) 20 MG capsule Take 1 capsule (20 mg total) by mouth daily before breakfast. 180 capsule 1     pravastatin (PRAVACHOL) 10 MG tablet Take 1 tablet (10 mg total) by mouth daily. 90 tablet 3     VENTOLIN HFA 90 mcg/actuation inhaler INHALE ONE OR TWO PUFFS BY MOUTH EVERY FOUR HOURS AS NEEDED FOR WHEEZING 18 Inhaler 2     [DISCONTINUED] phentermine (ADIPEX-P) 37.5 mg tablet Take 1 tablet (37.5 mg total) by mouth daily. 30 tablet 0     Current Facility-Administered Medications on File Prior to Visit   Medication Dose Route Frequency Provider Last Rate Last Dose     triamcinolone acetonide 40 mg/mL injection 40 mg (KENALOG-40)  40 mg Intra-articular Once Lisette Anand MD           Objective:  Vitals:    06/29/18 1452   BP: 126/76   Pulse: 92     Initial Weight: 239 lbs  Weight: 190 lb (86.2 kg)  Weight loss from initial: 49  % Weight loss: 20.5 %  Body mass index is 30.21 kg/(m^2).  No LMP recorded. Patient has had a hysterectomy.  General:  Patient is alert, pleasant, no distress.  Patient is obese.    This note has been dictated using voice recognition software. Any grammatical or context distortions are unintentional and inherent to the software

## 2021-06-20 NOTE — PROGRESS NOTES
"Assessment:     1. Obesity     2. Concern about disease without diagnosis     3. Weight gain          Hypertension, normal blood pressure   . Evidence of target organ damage: none.    Noted that she has gained weight over the last 2 months.  Before that she was on phentermine and was being followed at weight loss clinic.     Plan:     Patient Instructions   Call the weight loss clinic and ask about the rapid weight gain.    Check BP outside of clinic and keep a log.    Krystian Coreas MD  8/24/2018             Medication: NONE.  Dietary sodium restriction.  Regular aerobic exercise.  Check blood pressures 1-2 times weekly and record.  Follow up: 4 weeks and as needed.     Subjective:      Patient here for follow-up of elevated blood pressure.  This has been noted at another clinic.  Her systolic blood pressure will be around 130-140    She is not exercising and is not adherent to a low-salt diet.  Blood pressure is not well controlled at home. Cardiac symptoms: none. Patient denies: chest pain, chest pressure/discomfort, irregular heart beat, palpitations and syncope. Cardiovascular risk factors: dyslipidemia, family history of premature cardiovascular disease, obesity (BMI >= 30 kg/m2), sedentary lifestyle and smoking/ tobacco exposure. Use of agents associated with hypertension: none. History of target organ damage: none.    The following portions of the patient's history were reviewed and updated as appropriate: allergies, current medications, past family history, past medical history, past social history, past surgical history and problem list.  Allergies   Allergen Reactions     Bupropion Hcl Other (See Comments)     seizure     Smallpox Vaccine,Live Other (See Comments)     Per pt.\"My Mother was told never to allow it to be given again.\" Reaction when pt was a child     Cephalosporins Rash     Penicillins Rash     Small one on side       Current Outpatient Prescriptions on File Prior to Visit   Medication " Sig Dispense Refill     ALPRAZolam (XANAX) 0.5 MG tablet TAKE 1 TABLET BY MOUTH 3 TIMES A DAY AS NEEDED FOR ANXIETY 30 tablet 0     azelastine (ASTELIN) 137 mcg (0.1 %) nasal spray USE 1 SPRAY INTO EACH NOSTRIL 2 TIMES A DAY  6     beclomethasone (QVAR) 40 mcg/actuation inhaler Inhale 1 puff 2 (two) times a day. 1 Inhaler 12     BIOTIN ORAL Take by mouth. 1 tablet daily       CHOLECALCIFEROL, VITAMIN D3, (VITAMIN D3 ORAL) Take 1 capsule by mouth daily.       diclofenac sodium (VOLTAREN) 1 % Gel APPLY 2-3 GRAMS 3-4 TIMES PER DAY AS NEEDED  0     DULoxetine (CYMBALTA) 20 MG capsule Take 1 capsule (20 mg total) by mouth 2 (two) times a day. 180 capsule 1     FIBER, DEXTRIN, ORAL Take 1 tablet by mouth daily.       HYDROcodone-acetaminophen 5-325 mg per tablet Take 1-2 tablets by mouth every 4 (four) hours as needed for pain. 15 tablet 0     hydrOXYzine pamoate (VISTARIL) 25 MG capsule TAKE 1-2 CAPS BY MOUTH EVERY 4-6 HOURS AS NEEDED FOR PAIN 40 capsule 6     ibuprofen (ADVIL,MOTRIN) 200 MG tablet Take 600 mg by mouth every 6 (six) hours as needed for pain.        magnesium 200 mg Tab Take 1 tablet by mouth daily.       montelukast (SINGULAIR) 10 mg tablet Take 1 tablet (10 mg total) by mouth at bedtime. 30 tablet 12     multivitamin (ONE A DAY) per tablet Take 1 tablet by mouth daily.       omega-3 fatty acids-vitamin E (FISH OIL) 1,000 mg cap Take 1 capsule by mouth daily.       omeprazole (PRILOSEC) 20 MG capsule Take 1 capsule (20 mg total) by mouth daily before breakfast. 180 capsule 1     pravastatin (PRAVACHOL) 10 MG tablet Take 1 tablet (10 mg total) by mouth daily. 90 tablet 3     VENTOLIN HFA 90 mcg/actuation inhaler INHALE ONE OR TWO PUFFS BY MOUTH EVERY FOUR HOURS AS NEEDED FOR WHEEZING 18 Inhaler 2     phentermine (ADIPEX-P) 37.5 mg tablet Take 1 tablet (37.5 mg total) by mouth daily. 30 tablet 0     Current Facility-Administered Medications on File Prior to Visit   Medication Dose Route Frequency Provider  Last Rate Last Dose     triamcinolone acetonide 40 mg/mL injection 40 mg (KENALOG-40)  40 mg Intra-articular Once Lisette Anand MD           Patient Active Problem List   Diagnosis     Asthma     Thoracic Outlet Syndrome     Nicotine Dependence - In Remission     Generalized Anxiety Disorder     Osteoarthritis Of The Knee     Vitamin D Deficiency     Pure hypercholesterolemia     Major Depression, Recurrent     Fibromyalgia     Obesity     Bulimia nervosa     Acid reflux     Venous insufficiency of both lower extremities     Symptomatic varicose veins of both lower extremities     Psoriatic arthritis (H)     Mediastinal adenopathy     Snoring       Past Medical History:   Diagnosis Date     Anxiety disorder      Endometriosis     Created by Conversion  Replacement Utility updated for latest IMO load     Fibromyalgia      GERD (gastroesophageal reflux disease)      HLD (hyperlipidemia)      Nephrolithiasis     Created by Conversion      Spontaneous      Created by Conversion  Replacement Utility updated for latest IMO load       Past Surgical History:   Procedure Laterality Date     HYSTERECTOMY  2013     OOPHORECTOMY Right 2013     PA CRYOCAUTERY OF CERVIX      Description: Cervix Cryosurgery;  Recorded: 2007;     PA DILATION/CURETTAGE,DIAGNOSTIC      Description: Dilation And Curettage;  Recorded: 2007;     PA REVISE MEDIAN N/CARPAL TUNNEL SURG      Description: Neuroplasty Decompression Median Nerve At Carpal Tunnel;  Recorded: 2007;     PA TOTAL ABDOM HYSTERECTOMY      Description: Hysterectomy;  Proc Date: 2013;  Comments: both ovaries gone       Family History   Problem Relation Age of Onset     Breast cancer Paternal Grandmother 60     Stroke Mother      Heart attack Mother      Coronary artery disease Mother      Hypertension Mother      Atrial fibrillation Father        Social History     Social History     Marital status:      Spouse name: N/A     Number of  children: N/A     Years of education: N/A     Social History Main Topics     Smoking status: Former Smoker     Packs/day: 1.00     Years: 20.00     Smokeless tobacco: Never Used      Comment: quit 2006     Alcohol use No     Drug use: None     Sexual activity: Yes     Partners: Male     Birth control/ protection: Surgical     Other Topics Concern     None     Social History Narrative            The 10-year ASCVD risk score ([place] LANDRY Jr, et al., 2013) is: 0.8%      Values used to calculate the score:        Age: 47 years        Sex: Female        Is Non- : No        Diabetic: No        Tobacco smoker: No        Systolic Blood Pressure: 126 mmHg        Is BP treated: No        HDL Cholesterol: 69 mg/dL        Total Cholesterol: 231 mg/dL           Review of Systems  Constitutional: negative  Respiratory: negative  Cardiovascular: negative        Objective:   /84 (Patient Site: Left Arm, Patient Position: Sitting, Cuff Size: Adult Large)  Pulse 86  Temp 98.2  F (36.8  C) (Oral)   Resp 16  Wt 212 lb 9.6 oz (96.4 kg)  SpO2 97%  BMI 33.8 kg/m2    GENERAL APPEARANCE:  Appearing stated age, smiling, alert, cooperative, and in no acute distress.   HEENT: Pupils equal, regular, react to light and accommodation. Extraocular muscles intact, fundi benign. Ear canals and tympanic membranes are normal. Lips, mouth, and throat are unremarkable.   NECK: Neck supple without adenopathy, thyromegaly or masses.   LYMPH: No anterior cervical or supraclavicular LN enlargement   PULMONARY: Normal respiratory effort. Chest is clear.   CARDIOVASCULAR: Heart auscultation: rhythm regular, heart sounds normal S1 and S2, bruit carotid artery absent.   SKIN: Warm and well perfused..   ABDOMEN: Abdomen soft, non-tender. BS normal. No masses or organomegaly.   EXTREMITIES: Peripheral pulses are full. Extremities with no edema.   MENTAL STATUS: Alert, oriented and thought content appropriate   NEUROLOGIC: Station  and gait normal, strength and movement normal, reflexes are normal and symmetric

## 2021-06-20 NOTE — LETTER
Letter by Lamar Mejía MD at      Author: Lamar Mejía MD Service: -- Author Type: --    Filed:  Encounter Date: 3/2/2020 Status: (Other)         Sonya Mosqueda  [address] [place] [address]      2020      Dear Sonya Mosqueda,   : 1970      This letter is in regards to the appointment that you had scheduled on 3/2/2020 at the [facility] with Dr. Lamar Mejía.     The [facility] strives to see all patients in a timely manner and we need your help to achieve this.  The above-mentioned appointment was missed and we do not have record of a cancellation by you.  Whenever possible, we request appointment cancellations at least 72 hours in advance.  This time allows us to offer the appointment to another patient in need.      If you feel you have received this letter in error, or if you need to reschedule this appointment, please call our office so that we may update our records.      Sincerely,    [facility]

## 2021-06-22 NOTE — PROGRESS NOTES
Assessment and Plan:     Obesity  48 y.o. year old female in clinic today to discuss treatment of the following conditions through diet and lifestyle modification and weight loss:  1. Class 2 severe obesity due to excess calories with serious comorbidity and body mass index (BMI) of 36.0 to 36.9 in adult (H)    2. Vitamin D deficiency    3. Osteoarthritis Of The Knee    4. Psoriatic arthritis (H)    5. Pure hypercholesterolemia    6. Snoring      This patient presents to restart her weight loss program.  She quickly tapered her phentermine over the spring as result of other health concerns.  She struggling with appetite and cravings.  Currently she is eating very small amount of food in the morning, a reasonable lunch and then she wheezes and both healthy and sugar rich foods throughout the evening.  Her appetite is excessive.  She has the ability to cook as she cooks most of her 's keto-vegan diet.  Both the patient's  and her children are helpful.  Her description of symptoms could be consistent with binge eating disorder or night eating disorder.  Previously, she did well on phentermine.  She drinks 3-4 diet Cokes per day.  -Discussed pharmacotherapy.  Specifically, we focused on intervening her current meal plan with the pharmaceutical.  We discussed liraglutide versus phentermine/topiramate.  Ultimately, I prescribed phentermine/topiramate as it this may help her with her consumption of artificial sweeteners.  We discussed potential side effects.  Target dose for topiramate will be 100 mg daily.  No contraindication to resuming phentermine.  -Return to clinic in 4 weeks.    Continue supplements.    Recommend increasing movement throughout the day--sitting less, moving more.  Will increase activity over time to reach a goal of at least 150 minutes of moderate exercise each week.  Behavior modification:  Cognitive restructuring exercises, journaling stressors, triggers for food cravings.  Dietary  Intervention:  Reduced calorie, reduced carbohydrates, whole food diet.  Greater than 50% of this 15 minute visit was spent in counseling regarding patient's obesity, medications, dietary, exercise, and behavior modification recommendations.    Subjective  Patient presents for treatment of chronic, comorbid conditions using intensive therapeutic lifestyle interventions including nutrition, physical activity, and behavior management.    Weight has been increasing.  She eats well but will then have ice cream.  Small meal in the morning.  Lunch is okay.  She then grazes in the evening.     Cravings: Heavy throughout the night.  The patient does not believe that her cravings are the result of actual hunger nevertheless she finds them overwhelming.    Mood has been variable but the patient states she is doing okay overall.    Patient Active Problem List   Diagnosis     Asthma     Thoracic Outlet Syndrome     Nicotine Dependence - In Remission     Generalized Anxiety Disorder     Osteoarthritis Of The Knee     Vitamin D Deficiency     Pure hypercholesterolemia     Major Depression, Recurrent     Fibromyalgia     Obesity     Bulimia nervosa     Acid reflux     Venous insufficiency of both lower extremities     Symptomatic varicose veins of both lower extremities     Psoriatic arthritis (H)     Mediastinal adenopathy     Snoring       Current Outpatient Medications on File Prior to Visit   Medication Sig Dispense Refill     ALPRAZolam (XANAX) 0.5 MG tablet Take 1 tablet (0.5 mg total) by mouth 3 (three) times a day as needed for anxiety. 30 tablet 0     azelastine (ASTELIN) 137 mcg (0.1 %) nasal spray USE 1 SPRAY INTO EACH NOSTRIL 2 TIMES A DAY  6     beclomethasone (QVAR) 40 mcg/actuation inhaler Inhale 1 puff 2 (two) times a day. 1 Inhaler 12     BIOTIN ORAL Take by mouth. 1 tablet daily       CHOLECALCIFEROL, VITAMIN D3, (VITAMIN D3 ORAL) Take 1 capsule by mouth daily.       diclofenac sodium (VOLTAREN) 1 % Gel APPLY 2-3  GRAMS 3-4 TIMES PER DAY AS NEEDED  0     DULoxetine (CYMBALTA) 20 MG capsule Take 1 capsule (20 mg total) by mouth 2 (two) times a day. 180 capsule 1     FIBER, DEXTRIN, ORAL Take 1 tablet by mouth daily.       HYDROcodone-acetaminophen 5-325 mg per tablet Take 1-2 tablets by mouth every 4 (four) hours as needed for pain. 15 tablet 0     hydrOXYzine pamoate (VISTARIL) 25 MG capsule TAKE 1-2 CAPS BY MOUTH EVERY 4-6 HOURS AS NEEDED FOR PAIN. 40 capsule 6     ibuprofen (ADVIL,MOTRIN) 200 MG tablet Take 600 mg by mouth every 6 (six) hours as needed for pain.        magnesium 200 mg Tab Take 1 tablet by mouth daily.       montelukast (SINGULAIR) 10 mg tablet Take 1 tablet (10 mg total) by mouth at bedtime. 30 tablet 12     multivitamin (ONE A DAY) per tablet Take 1 tablet by mouth daily.       omega-3 fatty acids-vitamin E (FISH OIL) 1,000 mg cap Take 1 capsule by mouth daily.       omeprazole (PRILOSEC) 20 MG capsule TAKE 1 CAPSULE (20 MG TOTAL) BY MOUTH 2 (TWO) TIMES A DAY BEFORE MEALS. 180 capsule 2     pravastatin (PRAVACHOL) 10 MG tablet Take 1 tablet (10 mg total) by mouth daily. 90 tablet 3     VENTOLIN HFA 90 mcg/actuation inhaler INHALE ONE OR TWO PUFFS BY MOUTH EVERY FOUR HOURS AS NEEDED FOR WHEEZING 18 Inhaler 2     [DISCONTINUED] omeprazole (PRILOSEC) 20 MG capsule Take 1 capsule (20 mg total) by mouth daily before breakfast. 180 capsule 1     [DISCONTINUED] phentermine (ADIPEX-P) 37.5 mg tablet Take 1 tablet (37.5 mg total) by mouth daily. 30 tablet 0     Current Facility-Administered Medications on File Prior to Visit   Medication Dose Route Frequency Provider Last Rate Last Dose     triamcinolone acetonide 40 mg/mL injection 40 mg (KENALOG-40)  40 mg Intra-articular Once Lisette Anand MD           Objective:  Vitals:    12/14/18 0758   BP: 108/70   Pulse: 88   Resp: 16     Initial Weight: 239 lbs  Weight: (!) 227 lb (103 kg)  Weight loss from initial: 12  % Weight loss: 5.02 %    Body mass index is  36.09 kg/m .  No LMP recorded. Patient has had a hysterectomy.  General:  Patient is alert, pleasant, no distress.  Patient is obese.  Cardiac: Regular rate and rhythm, normal S1/S2, no murmurs rubs gallops  Respiratory: Clear to auscultation bilaterally.    This note has been dictated using voice recognition software. Any grammatical or context distortions are unintentional and inherent to the software

## 2021-06-23 NOTE — TELEPHONE ENCOUNTER
Who is calling:  Deborah from [facility] in [facility]  Reason for Call:  Pharmacy has sent 3 request through [employer]. Refill request from [employer] is also still waiting provider approval. Patient is almost out of medication, please send a refill as soon as possible.  Date of last appointment with primary care: 1/11/19  Has the patient been recently seen:  No  Okay to leave a detailed message: Yes

## 2021-06-23 NOTE — TELEPHONE ENCOUNTER
Controlled Substance Refill Request  Medication:   Requested Prescriptions     Pending Prescriptions Disp Refills     ALPRAZolam (XANAX) 0.5 MG tablet [Pharmacy Med Name: ALPRAZOLAM 0.5 MG TABLET] 30 tablet 0     Sig: TAKE 1 TABLET (0.5 MG TOTAL) BY MOUTH 3 (THREE) TIMES A DAY AS NEEDED FOR ANXIETY.     Date Last Fill: 11/28/18   #30 R-0  Pharmacy: [facility] [id number]   Submit electronically to pharmacy  Controlled Substance Agreement on File:   Encounter-Level CSA Scan Date:    There are no encounter-level csa scan date.       Last office visit: 1/11/19   MD Therese Shelby RN Triage Care Connection

## 2021-06-23 NOTE — PROGRESS NOTES
Assessment and Plan:     Obesity  48 y.o. year old female in clinic today to discuss treatment of the following conditions through diet and lifestyle modification and weight loss:  1. Class 1 obesity due to excess calories with serious comorbidity and body mass index (BMI) of 34.0 to 34.9 in adult    2. Allergic rhinitis    3. Pure hypercholesterolemia    4. Bulimia nervosa    5. Gastroesophageal reflux disease, esophagitis presence not specified    6. Snoring      The patient's weight loss result since the last visit was successful as evidenced by weight loss.  It is unclear if the patient is having intolerable side effects from topiramate.  The medicine has resulted in behavioral changes, specifically she no longer enjoys drinking diet soda.  She has had no word finding or paresthesias.  However, she feels emotionally flat.  This is in the context of a brief GI illness over the past month as well as exacerbation of arthritic pain in her heel.  -Continue topiramate for now.  If the patient decides that her emotional flatness is not improving, she can decrease her topiramate dose to 25 mg nightly.  -Continue phentermine.  -This combination is bit essentially useful long-term as phentermine/topiramate is if he approved for long-term use.    Continue supplements.    Recommend increasing movement throughout the day--sitting less, moving more.  Will increase activity over time to reach a goal of at least 150 minutes of moderate exercise each week.  Behavior modification:  Cognitive restructuring exercises, journaling stressors, triggers for food cravings.  Dietary Intervention:  Reduced calorie, reduced carbohydrates, whole food diet.  Greater than 50% of this 15 minute visit was spent in counseling regarding patient's obesity, medications, dietary, exercise, and behavior modification recommendations.    Subjective  Patient presents for treatment of chronic, comorbid conditions using intensive therapeutic lifestyle  interventions including nutrition, physical activity, and behavior management.   - successes: happy that she lost weight.  No diet soda because of taste changes   - struggles: flare or arthritis.  She was injected with cortisone.  Emotionally flat.     - exercise plan: less exercise but some pilates.      - hunger: controlled.   - medication side effects: none   - Barriers to losing weight:  Behavior:  inadequate exercise    Patient Active Problem List   Diagnosis     Asthma     Thoracic Outlet Syndrome     Nicotine Dependence - In Remission     Generalized Anxiety Disorder     Osteoarthritis Of The Knee     Vitamin D Deficiency     Pure hypercholesterolemia     Major Depression, Recurrent     Fibromyalgia     Obesity     Bulimia nervosa     Acid reflux     Venous insufficiency of both lower extremities     Symptomatic varicose veins of both lower extremities     Psoriatic arthritis (H)     Mediastinal adenopathy     Snoring       Current Outpatient Medications on File Prior to Visit   Medication Sig Dispense Refill     ALPRAZolam (XANAX) 0.5 MG tablet Take 1 tablet (0.5 mg total) by mouth 3 (three) times a day as needed for anxiety. 30 tablet 0     azelastine (ASTELIN) 137 mcg (0.1 %) nasal spray USE 1 SPRAY INTO EACH NOSTRIL 2 TIMES A DAY  6     beclomethasone (QVAR) 40 mcg/actuation inhaler Inhale 1 puff 2 (two) times a day. 1 Inhaler 12     BIOTIN ORAL Take by mouth. 1 tablet daily       CHOLECALCIFEROL, VITAMIN D3, (VITAMIN D3 ORAL) Take 1 capsule by mouth daily.       diclofenac sodium (VOLTAREN) 1 % Gel APPLY 2-3 GRAMS 3-4 TIMES PER DAY AS NEEDED  0     DULoxetine (CYMBALTA) 20 MG capsule Take 1 capsule (20 mg total) by mouth 2 (two) times a day. 180 capsule 1     FIBER, DEXTRIN, ORAL Take 1 tablet by mouth daily.       HYDROcodone-acetaminophen 5-325 mg per tablet Take 1-2 tablets by mouth every 4 (four) hours as needed for pain. 15 tablet 0     hydrOXYzine pamoate (VISTARIL) 25 MG capsule TAKE 1-2 CAPS BY  MOUTH EVERY 4-6 HOURS AS NEEDED FOR PAIN. 40 capsule 6     ibuprofen (ADVIL,MOTRIN) 200 MG tablet Take 600 mg by mouth every 6 (six) hours as needed for pain.        magnesium 200 mg Tab Take 1 tablet by mouth daily.       multivitamin (ONE A DAY) per tablet Take 1 tablet by mouth daily.       omega-3 fatty acids-vitamin E (FISH OIL) 1,000 mg cap Take 1 capsule by mouth daily.       omeprazole (PRILOSEC) 20 MG capsule TAKE 1 CAPSULE (20 MG TOTAL) BY MOUTH 2 (TWO) TIMES A DAY BEFORE MEALS. 180 capsule 2     pravastatin (PRAVACHOL) 10 MG tablet Take 1 tablet (10 mg total) by mouth daily. 90 tablet 3     topiramate (TOPAMAX) 25 MG tablet Take 1 tablet (25 mg total) by mouth 2 (two) times a day (start with one tab for the first 10-14 days). 30 tablet 2     VENTOLIN HFA 90 mcg/actuation inhaler INHALE ONE OR TWO PUFFS BY MOUTH EVERY FOUR HOURS AS NEEDED FOR WHEEZING 18 Inhaler 2     [DISCONTINUED] montelukast (SINGULAIR) 10 mg tablet Take 1 tablet (10 mg total) by mouth at bedtime. 30 tablet 12     [DISCONTINUED] phentermine 15 MG capsule Take 1 capsule (15 mg total) by mouth every morning. 30 capsule 0     Current Facility-Administered Medications on File Prior to Visit   Medication Dose Route Frequency Provider Last Rate Last Dose     [DISCONTINUED] triamcinolone acetonide 40 mg/mL injection 40 mg (KENALOG-40)  40 mg Intra-articular Once Lisette Anand MD           Objective:  Vitals:    01/11/19 1539   BP: 128/74   Pulse: 84     Initial Weight: 239 lbs  Weight: 219 lb (99.3 kg)  Weight loss from initial: 20  % Weight loss: 8.37 %    Body mass index is 34.82 kg/m .  No LMP recorded. Patient has had a hysterectomy.  General:  Patient is alert, pleasant, no distress.  Patient is obese.    This note has been dictated using voice recognition software. Any grammatical or context distortions are unintentional and inherent to the software

## 2021-06-23 NOTE — TELEPHONE ENCOUNTER
Controlled Substance Refill Request  Medication Name:   Requested Prescriptions     Pending Prescriptions Disp Refills     ALPRAZolam (XANAX) 0.5 MG tablet 30 tablet 0     Sig: Take 1 tablet (0.5 mg total) by mouth 3 (three) times a day as needed for anxiety.     Date Last Fill:   11/28/2019  Pharmacy:   [facility] [id number] [place] [address]    Submit electronically to pharmacy  Controlled Substance Agreement Date Scanned:   Encounter-Level CSA Scan Date:    There are no encounter-level csa scan date.       Last office visit with prescriber/PCP: 2/28/2018 Reina Soler MD OR same dept: 8/24/2018 Krystian Coreas MD OR same specialty: 1/11/2019 Audie Salinas MD  Last physical: Visit date not found Last MTM visit: Visit date not found

## 2021-06-23 NOTE — TELEPHONE ENCOUNTER
appt made for Friday 02/15/2019-pt would like to try the phentermine 37.5mg and will update you at her appt, she also will read her [employer] message    Lorene Noguera LPN

## 2021-06-23 NOTE — TELEPHONE ENCOUNTER
Called pt-she would like to stop the topama 25mg and phentermine 15mg and go back to the phentermine 37.5mg tablets. She will call back to reschedule her appointment. Set up to authorize if you agree.    Lorene Noguera

## 2021-06-24 NOTE — PROGRESS NOTES
"Assessment/Plan:    Obesity  48 y.o. year old female in clinic today to discuss treatment of the following conditions through diet and lifestyle modification and weight loss:  1. Visit for screening mammogram    2. Class 1 obesity due to excess calories with serious comorbidity and body mass index (BMI) of 34.0 to 34.9 in adult    3. Pure hypercholesterolemia    4. Snoring    5. Vitamin D deficiency      The patient's weight loss result since the last visit was mixed based on weight stability.  Her diet plan is scattered currently.  Her  is keot right now    - trial of keto?  Discussed need to add healthy fats.   - decrease focus on calories.    - plan and prepare    Follow-up: 1 months      Return in about 4 weeks (around 3/15/2019) for Weight loss follow-up.    Audie Salinas MD  _______________________________    Chief Complaint   Patient presents with     Weight Loss     last weight 219lbs     Hip Pain     left x 3 days      Subjective: Sonya Mosqueda is a 48 y.o. year old female who returns to clinic for the following chronic complaints/concerns:     Weight:   - she continues to be hungry.  Meal plans have have been inconsistent.   - left hip pain.  Duration: 3 days.  Symptoms okay during the night.  Relaxes with sitting.  She has been walking funny as a result of her arthritis.  Left knee pain.     - meal plans: \"mindless stress eating.\"     - Breakfast: meal replacement. Lunch: salad and protein.  Dinner: whatever calories are left.  She avoids sugar.  She avoids dairy and white flower.  Dinner: working to cook.  [employer].     - she does not want to be on topiramate.  She felt like she \"did not care about anything.\"      Review of systems is negative except for as shown in the HPI.    The following portions of the patient's history were reviewed and updated as appropriate: allergies, current medications, past medical history and problem list.    Objective:    weight is 219 lb (99.3 kg). Her oral " temperature is 98.1  F (36.7  C). Her blood pressure is 118/74 and her pulse is 80. Her respiration is 18 and oxygen saturation is 98%.   Gen: nad  Psych: normal affect.    No results found for this or any previous visit (from the past 24 hour(s)).    Additional History from Old Records Summarized (2): no  Decision to Obtain Records (1): no  Radiology Tests Summarized or Ordered (1): no  Labs Reviewed or Ordered (1): no  Medicine Test Summarized or Ordered (1): no  Independent Review of EKG or X-RAY(2 each): no    This note has been dictated using voice recognition software. Any grammatical or context distortions are unintentional and inherent to the software

## 2021-06-24 NOTE — TELEPHONE ENCOUNTER
Refill Approved    Rx renewed per Medication Renewal Policy. Medication was last renewed on 6/9/2018 with 1 refill.   Last office visit: 2/15/2019 with Dr DAVID Salinas @ [facility].      Gabriella Amezcua, [facility] Triage/Med Refill 2/17/2019     Requested Prescriptions   Pending Prescriptions Disp Refills     DULoxetine (CYMBALTA) 20 MG capsule [Pharmacy Med Name: DULOXETINE  20MG  CAP] 180 capsule 1     Sig: TAKE 1 CAPSULE BY MOUTH TWO TIMES DAILY    Tricyclics/Misc Antidepressant/Antianxiety Meds Refill Protocol Passed - 2/14/2019  5:14 AM       Passed - PCP or prescribing provider visit in last year    Last office visit with prescriber/PCP: 2/28/2018 Reina Soler MD OR same dept: 8/24/2018 Krystian Coreas MD OR same specialty: 1/11/2019 Audie Salinas MD  Last physical: Visit date not found Last MTM visit: Visit date not found   Next visit within 3 mo: Visit date not found  Next physical within 3 mo: Visit date not found  Prescriber OR PCP: Reina Soler MD  Last diagnosis associated with med order: There are no diagnoses linked to this encounter.  If protocol passes may refill for 12 months if within 3 months of last provider visit (or a total of 15 months).

## 2021-06-24 NOTE — TELEPHONE ENCOUNTER
Controlled Substance Refill Request  Medication:   Requested Prescriptions     Pending Prescriptions Disp Refills     phentermine (ADIPEX-P) 37.5 mg tablet [Pharmacy Med Name: PHENTERMINE 37.5 MG TABLET] 30 tablet 0     Sig: TAKE 0.5 TABLETS (18.75 MG TOTAL) BY MOUTH 2 (TWO) TIMES A DAY.     Date Last Fill: 2/7/19  #30 R-0  Pharmacy:  [place] [id number] Submit electronically to pharmacy  Controlled Substance Agreement on File:   Encounter-Level CSA Scan Date:    There are no encounter-level csa scan date.       Last office visit: 2/15/2019 Audie Salinas MD Katie Beck RN Triage Nurse Advisor [facility]

## 2021-06-25 NOTE — TELEPHONE ENCOUNTER
Refill Approved    Rx renewed per Medication Renewal Policy. Medication was last renewed on 11/28/19.  OV 2/15/19  [facility] Triage/Med Refill 3/17/2019     Requested Prescriptions   Pending Prescriptions Disp Refills     hydrOXYzine pamoate (VISTARIL) 25 MG capsule 40 capsule 6     Sig: TAKE 1-2 CAPS BY MOUTH EVERY 4-6 HOURS AS NEEDED FOR PAIN    Antihistamine Refill Protocol Passed - 3/17/2019  2:00 PM       Passed - Patient has had office visit/physical in last year    Last office visit with prescriber/PCP: Visit date not found OR same dept: Visit date not found OR same specialty: Visit date not found  Last physical: Visit date not found Last MTM visit: Visit date not found   Next visit within 3 mo: Visit date not found  Next physical within 3 mo: Visit date not found  Prescriber OR PCP: Lorene Bowman RN  Last diagnosis associated with med order: 1. Myalgia  - hydrOXYzine pamoate (VISTARIL) 25 MG capsule; TAKE 1-2 CAPS BY MOUTH EVERY 4-6 HOURS AS NEEDED FOR PAIN  Dispense: 40 capsule; Refill: 6    2. Myositis  - hydrOXYzine pamoate (VISTARIL) 25 MG capsule; TAKE 1-2 CAPS BY MOUTH EVERY 4-6 HOURS AS NEEDED FOR PAIN  Dispense: 40 capsule; Refill: 6    If protocol passes may refill for 12 months if within 3 months of last provider visit (or a total of 15 months).

## 2021-06-25 NOTE — TELEPHONE ENCOUNTER
Medication Question or Clarification  Who is calling: Patient  What medication are you calling about? (include dose and sig) hydroxyzine 25 mg capsule. Take 1-2 caps by mouth every 4-6 hours as needed for pain.  Who prescribed the medication?: Reina Soler MD   What is your question/concern?: The pharmacy has informed patient that this medication is not currently available in capsule form. Sonya is requesting a new prescription for tablets instead.   Pharmacy: [facility] #[id number]  Okay to leave a detailed message?: Yes  Site CMT - Please call the pharmacy to obtain any additional needed information.

## 2021-06-25 NOTE — TELEPHONE ENCOUNTER
Patient was seen for regulatory long-term-care visit.  Course was reviewed with nurse practitioner.    Overall status has been stable.  Hemoglobin was recently noted to be 10.5 which represents a drop from 11.9 in October of last year.  There is been no evidence of occult bleeding.    Patient denies any acute concerns.    Blood glucoses have been in the 100s for the most part.  Most recent hemoglobin A1c in October of last year was 6.6    Vital signs stable  Patient is alert, sitting in his wheelchair.  Affect blunted.  Lungs clear  CV irregular heart rate 60s  Abdomen soft  Left facial droop and left hemiparesis unchanged.    Assessment    Status post CVA with cognitive deficits, left-sided weakness     Diabetes mellitus type 2, controlled on insulin    Chronic atrial fibrillation, heart rate controlled, on Xarelto    Anemia with recent drop in hemoglobin as noted above.  No evidence of occult blood loss.  Patient is at risk for occult GI bleeding, on Xarelto.    Plan  Monitor hemoglobin, if continued drop, screen for occult GI bleeding.  Continue Xarelto for now.  Continue same insulin regimen.  Routine lab monitoring.         RN Assessment/Reason for Call:   Okay to leave Detailed Message  Patient calling in, hydroxyzine needs to change no longer have capsules.  Pharmacy called in 2 weeks ago  She called a week ago.  Needs tablets  RN Action/Disposition:  Send to PCP and refill team  Agrees to plan.     Lorene Bowman RN    [facility] Triage/med refill  3/17/2019  1:59 PM

## 2021-06-25 NOTE — TELEPHONE ENCOUNTER
Central PA team  [phone]  Pool: [facility] PA MED ([id number])          PA has been initiated.       PA form completed and faxed insurance via Cover My Meds     Key:  semaglutide 0.25 mg or 0.5 mg(2 mg/1.5 mL) PnIj     Medication:  AMAURI GONZALEZ (Key: [id number])    Insurance:  [employer]        Response will be received via fax and may take up to 5-10 business days depending on plan

## 2021-06-25 NOTE — PROGRESS NOTES
"Assessment and Plan:     Insulin resistance  This patient continues to struggle with weight.  She has a history of an elevated hemoglobin A1c and physical exam characteristics suggesting insulin resistance.  Appetite remains high.  We had a lengthy conversation regarding potential treatment options for obesity and insulin resistance.  She has been on phentermine in the past.  On concerned that this has not been particularly effective in the past, it may increase her anxiety and may result in weight loss with subsequent weight regain.  She was interested in Saxenda but we learned that she has a coverage exclusion to her insurance.  Unclear if she will get coverage for semaglutide (subcutaneous).  We discussed that this would be a treatment for insulin resistance/prediabetes but is not FDA approved for medical weight loss.  This was sent to the pharmacy.  If not covered, we will consider resuming phentermine at 15 mg.  We discussed need for dietary changes.  We reviewed her 's plan which actually would work quite well for her if she is able to execute.  Follow-up in 1 month for med check recommended.  Lastly, we discussed the role of cortisol, stress, lengthy work schedule in weight loss.  This is probably one of the main barriers to her finding success.  She says that she is aware that this has been detrimental to her health and has had thoughts of changing career path.    Continue supplements.    50 minutes spent on the date of the encounter doing chart review, history and exam, documentation and further activities per the note    Subjective  Patient presents for treatment of chronic, comorbid conditions using intensive therapeutic lifestyle interventions including nutrition, physical activity, and behavior management.    RESTART: she has experimenting with fasting and protein shake.  Job remains sedentary.  \"I don't feel good and have a hard time keeping up.\"  Interested in kayaking.  Psoriatic arthritis is " ongoing.     - started HRT.  Perimenopausal.  Less hot flashes.   - she snores but does not have witnessed apneic episodes.     - she is working 60+ hours.  Sleeps about 8 hrs per night.    - saxenda: not covered by insurance   - anxiety: continues    No flowsheet data found.    Patient Active Problem List   Diagnosis     Asthma     Thoracic Outlet Syndrome     Nicotine Dependence - In Remission     Generalized Anxiety Disorder     Osteoarthritis Of The Knee     Vitamin D Deficiency     Pure hypercholesterolemia     Major Depression, Recurrent     Fibromyalgia     Severe obesity (BMI 35.0-39.9) with comorbidity (H)     Bulimia nervosa     Acid reflux     Venous insufficiency of both lower extremities     Symptomatic varicose veins of both lower extremities     Psoriatic arthritis (H)     Mediastinal adenopathy     Snoring     Insulin resistance       Current Outpatient Medications on File Prior to Visit   Medication Sig Dispense Refill     albuterol (VENTOLIN HFA) 90 mcg/actuation inhaler INHALE ONE OR TWO PUFFS BY MOUTH EVERY FOUR HOURS AS NEEDED FOR WHEEZING 18 g 2     ALPRAZolam (XANAX) 0.5 MG tablet Take 1 tablet (0.5 mg total) by mouth 3 (three) times a day as needed for anxiety. 30 tablet 0     biotin 5,000 mcg TbDL Take 1 tablet by mouth daily. 1 tablet daily       cholecalciferol, vitamin D3, (VITAMIN D3) 50 mcg (2,000 unit) Tab Take 1 capsule by mouth daily.        dextrin (FIBER, DEXTRIN,) 3 gram/3.5 gram Powd Take 1 tablet by mouth daily.        diclofenac sodium (VOLTAREN) 1 % Gel APPLY 2-3 GRAMS 3-4 TIMES PER DAY AS NEEDED  0     DULoxetine (CYMBALTA) 20 MG capsule TAKE 1 CAPSULE BY MOUTH  TWICE DAILY 180 capsule 2     estradioL (ESTRACE) 0.5 MG tablet Take 1 tablet (0.5 mg total) by mouth daily. 90 tablet 1     HYDROcodone-acetaminophen 5-325 mg per tablet Take 1-2 tablets by mouth every 4 (four) hours as needed for pain. 15 tablet 0     hydrOXYzine HCL (ATARAX) 25 MG tablet Take 1-2 tablets (25-50 mg  total) by mouth every 6 (six) hours as needed (pain). 360 tablet 3     ibuprofen (ADVIL,MOTRIN) 200 MG tablet Take 600 mg by mouth every 6 (six) hours as needed for pain.        ketoconazole (NIZORAL) 2 % shampoo LATHER ON SCALP, LEAVE ON FOR 5 MINUTES AND RINSE 120 mL 3     magnesium 200 mg Tab Take 1 tablet by mouth daily.       montelukast (SINGULAIR) 10 mg tablet TAKE 1 TABLET BY MOUTH  DAILY 90 tablet 3     multivitamin (ONE A DAY) per tablet Take 1 tablet by mouth daily.       omega-3 fatty acids-vitamin E (FISH OIL) 1,000 mg cap Take 1 capsule by mouth daily.       omeprazole (PRILOSEC) 20 MG capsule Take 1 capsule (20 mg total) by mouth 2 (two) times a day before meals. (Patient taking differently: Take 20 mg by mouth once. ) 180 capsule 2     pravastatin (PRAVACHOL) 10 MG tablet TAKE 1 TABLET BY MOUTH  DAILY 90 tablet 3     QVAR REDIHALER 40 mcg/actuation HFAB inhaler USE 1 INHALATION BY MOUTH  TWICE DAILY 21.2 g 3     No current facility-administered medications on file prior to visit.        Objective:  Vitals:    06/03/21 0713   BP: 124/80   Pulse: 84   Resp: 18   Temp: 98.7  F (37.1  C)   SpO2: 98%     Weight: (!) 228 lb (103.4 kg)    Body mass index is 36.8 kg/m .  No LMP recorded. Patient has had a hysterectomy.  GENERAL: Healthy, alert and no distress  EYES: Eyes grossly normal to inspection. No discharge or erythema, or obvious scleral/conjunctival abnormalities.  RESP: No audible wheeze, cough, or visible cyanosis.  No visible retractions or increased work of breathing.    SKIN: Visible skin clear. No significant rash, abnormal pigmentation or lesions.  No obvious skin tags.  No acanthosis.    NEURO: Cranial nerves grossly intact. Mentation and speech appropriate for age.  PSYCH: Mentation appears normal, affect normal/bright, judgement and insight intact, normal speech and appearance well-groomed      This note has been dictated using voice recognition software. Any grammatical or context  distortions are unintentional and inherent to the software.

## 2021-06-25 NOTE — TELEPHONE ENCOUNTER
Prior Authorization Request  Who s requesting:  Pharmacy  [facility]   Medication Name: semaglutide   Insurance Plan: [employer]  Insurance Member ID Number:  [id number]  CoverMyMeds Key: N/A  Informed patient that prior authorizations can take up to 10 business days for response:   Yes  Okay to leave a detailed message: Yes

## 2021-06-29 ENCOUNTER — VIRTUAL VISIT (OUTPATIENT)
Dept: FAMILY MEDICINE | Facility: CLINIC | Age: 51
End: 2021-06-29
Payer: COMMERCIAL

## 2021-06-29 DIAGNOSIS — F41.9 ANXIETY: ICD-10-CM

## 2021-06-29 PROCEDURE — 99214 OFFICE O/P EST MOD 30 MIN: CPT | Mod: GT | Performed by: FAMILY MEDICINE

## 2021-06-29 RX ORDER — VENLAFAXINE HYDROCHLORIDE 37.5 MG/1
37.5 CAPSULE, EXTENDED RELEASE ORAL DAILY
Qty: 30 CAPSULE | Refills: 0 | Status: SHIPPED | OUTPATIENT
Start: 2021-06-29 | End: 2021-06-29

## 2021-06-29 RX ORDER — VENLAFAXINE HYDROCHLORIDE 37.5 MG/1
37.5 CAPSULE, EXTENDED RELEASE ORAL DAILY
Qty: 90 CAPSULE | Refills: 0 | Status: SHIPPED | OUTPATIENT
Start: 2021-06-29 | End: 2021-07-23

## 2021-06-29 RX ORDER — ALPRAZOLAM 0.5 MG
0.5 TABLET ORAL
Qty: 20 TABLET | Refills: 0 | Status: SHIPPED | OUTPATIENT
Start: 2021-06-29 | End: 2021-08-18

## 2021-06-29 NOTE — PROGRESS NOTES
Sonya is a 50 year old who is being evaluated via a billable video visit.      How would you like to obtain your AVS? MyChart  If the video visit is dropped, the invitation should be resent by: Text to cell phone: [phone]  Will anyone else be joining your video visit? No      Video Start Time: 105pm    -------------------------    Assessment/Plan:    Sonya Mosqueda is a 50 year old female presenting for:    Anxiety  Refill of Xanax to the pharmacy.  We will start low-dose Effexor and we discussed risk benefits of medication.  - ALPRAZolam (XANAX) 0.5 MG tablet  Dispense: 20 tablet; Refill: 0  - venlafaxine (EFFEXOR-XR) 37.5 MG 24 hr capsule  Dispense: 90 capsule; Refill: 0    Elevated blood pressures: Patient will continue to monitor.  Recommended that she come to clinic for a complete physical examination at some point in the next 1 to 2 months at which point we can do laboratory testing to check blood pressure here in clinic.  Discussed that this is difficult to evaluate on a video visit which she understands.      Medications Discontinued During This Encounter   Medication Reason     ALPRAZolam (XANAX) 0.5 MG tablet Reorder     DULoxetine (CYMBALTA) 20 MG EC capsule      venlafaxine (EFFEXOR-XR) 37.5 MG 24 hr capsule Reorder           Chief Complaint:  Blood Pressure Check and Anxiety          Subjective:   Sonya Mosqueda is a pleasant 50 year old female being evaluated via video visit today for the following concern/s:     Anxiety: Patient has a past medical history significant for anxiety.  She has Ativan which she uses only very occasionally.  She notes that recently she has been very anxious.  She was started on phentermine at one point by her weight loss specialist and became much more anxious.  Since that time she has been feeling increased anxiety.  Mild panic attacks.  Ativan does help but she states she has been using it more recently.  She estimates she is using this a few times weekly whereas  generally she will use it once or twice a month.    She is hopeful to get something that will help with anxiety but also not cause weight gain.    She is noted elevated blood pressures in the past.  She knows that these are in the 130s over 90s occasionally.  She checks these at home.  She is unsure how accurate her cuff is.  This is an arm cuff.  She denies chest pain or shortness of breath      12 point review of systems completed and negative except for what has been described above    History   Smoking Status     Current Every Day Smoker     Packs/day: 0.50     Years: 20.00     Types: Cigarettes   Smokeless Tobacco     Never Used         Current Outpatient Medications:      albuterol (VENTOLIN HFA) 108 (90 Base) MCG/ACT inhaler, INHALE ONE OR TWO PUFFS BY MOUTH EVERY FOUR HOURS AS NEEDED FOR WHEEZING, Disp: 1 Inhaler, Rfl: 1     ALPRAZolam (XANAX) 0.5 MG tablet, Take 1 tablet (0.5 mg) by mouth nightly as needed for anxiety, Disp: 20 tablet, Rfl: 0     beclomethasone HFA (QVAR REDIHALER) 40 MCG/ACT inhaler, USE 1 INHALATION BY MOUTH  TWICE DAILY, Disp: , Rfl:      Biotin 5 MG TBDP, Take 1 tablet by mouth, Disp: , Rfl:      cholecalciferol 50 MCG (2000 UT) tablet, Take 1 capsule by mouth, Disp: , Rfl:      estradiol (ESTRACE) 0.5 MG tablet, Take 1 tablet (0.5 mg) by mouth daily, Disp: 90 tablet, Rfl: 1     Fiber, Corn Dextrin, POWD, Take 1 tablet by mouth, Disp: , Rfl:      HYDROcodone-acetaminophen (NORCO) 5-325 MG per tablet, TAKE 1-2 TABS BY MOUTH ONCE DAILY AS NEEDED, Disp: , Rfl: 0     hydrOXYzine (ATARAX) 25 MG tablet, TAKE 1 2 TABLETS BY MOUTH EVERY 6 HOURS AS NEEDED, Disp: , Rfl:      IBUPROFEN 200 200 MG OR TABS, 1 TABLET EVERY 4 TO 6 HOURS AS NEEDED, Disp: , Rfl:      ketoconazole (NIZORAL) 2 % external shampoo, LATHER ON SCALP, LEAVE ON FOR 5 MINUTES AND RINSE, Disp: 120 mL, Rfl: 1     magnesium 200 MG TABS, Take 1 tablet by mouth, Disp: , Rfl:      montelukast (SINGULAIR) 10 MG tablet, Take 1 tablet  "(10 mg) by mouth At Bedtime, Disp: 30 tablet, Rfl: 0     multivitamin w/minerals (MULTI-VITAMIN) tablet, Take 1 tablet by mouth, Disp: , Rfl:      OMEGA-3 FATTY ACIDS-VITAMIN E PO, Take 1 capsule by mouth, Disp: , Rfl:      omeprazole (PRILOSEC) 20 MG DR capsule, Take 20 mg by mouth, Disp: , Rfl:      pravastatin (PRAVACHOL) 10 MG tablet, Take 1 tablet (10 mg) by mouth daily, Disp: 90 tablet, Rfl: 1     venlafaxine (EFFEXOR-XR) 37.5 MG 24 hr capsule, Take 1 capsule (37.5 mg) by mouth daily, Disp: 90 capsule, Rfl: 0     ZYRTEC 10 MG OR TABS, 1 TABLET DAILY, Disp: , Rfl:         Objective:  No vitals were done due to the nature of this visit  Vitals - Patient Reported 7/3/2020   Height (Patient Reported) 5' 6.5\"   Weight (Patient Reported) 212 lb 8 oz   BMI (Based on Pt Reported Ht/Wt) 33.78 kg/m2               General: No acute distress  Psych: Appropriate affect  HEENT: moist mucous membranes  Pulmonary: Breathing comfortably, speaking in complete sentences  Extremities: warm and well perfused with no edema  Skin: warm and dry with no rash       This note has been dictated and transcribed using voice recognition software.   Any errors in transcription are unintentional and inherent to the software.      Video-Visit Details    Type of service:  Video Visit    Video End Time:1:32 PM    Originating Location (pt. Location): Home    Distant Location (provider location):  [facility]     Platform used for Video Visit: Allan"

## 2021-07-03 NOTE — ADDENDUM NOTE
Addendum Note by Ana Maria Sierra LPN at 2/15/2018  4:35 PM     Author: Ana Maria Sierra LPN Service: -- Author Type: Licensed Nurse    Filed: 2/15/2018  4:35 PM Encounter Date: 2/13/2018 Status: Signed    : Ana Maria Sierra LPN (Licensed Nurse)    Addended by: ANA MARIA SIERRA on: 2/15/2018 04:35 PM        Modules accepted: Orders

## 2021-07-03 NOTE — ADDENDUM NOTE
Addendum Note by Lamar Mejía MD at 7/15/2019 12:35 PM     Author: Lamar Mejía MD Service: -- Author Type: Physician    Filed: 7/15/2019 12:35 PM Encounter Date: 7/11/2019 Status: Signed    : Lamar Mejía MD (Physician)    Addended by: LAMAR MEJÍA on: 7/15/2019 12:35 PM        Modules accepted: Orders

## 2021-07-03 NOTE — ADDENDUM NOTE
Addendum Note by Azra Cunningham MD at 6/22/2018  7:54 AM     Author: Azra Cunningham MD Service: -- Author Type: Physician    Filed: 6/22/2018  7:54 AM Encounter Date: 6/22/2018 Status: Signed    : Azra Cunningham MD (Physician)    Addended by: AZRA CUNNINGHAM on: 6/22/2018 07:54 AM        Modules accepted: Orders

## 2021-07-04 NOTE — TELEPHONE ENCOUNTER
Telephone Encounter by Keo Lopez at 6/7/2021  3:18 PM     Author: Keo Lopez Service: -- Author Type: Patient Access    Filed: 6/7/2021  3:23 PM Encounter Date: 6/3/2021 Status: Signed    : Keo Lopez (Patient Access)       PRIOR AUTHORIZATION DENIED    Denial Rational: The request for coverage for Ozempic 2mg/1.5ml, use as directed (1.5ml per 28 days), is denied. This decision is based on health plan criteria for Ozempic. This medicine is covered only if:  You have a history of suboptimal response, contraindication or intolerance to metformin (generic Glucophage, Glucophage XR). The information provided does not show that you meet the criteria listed above.        Appeal Information: If the provider would like to appeal this denial, please provide a letter of medical necessity and once it has been completed and placed in the patient's chart, notify the [facility] ([facility] [id number]) and the appeal can be initiated on behalf of the patient and provider.  Please also include any therapies that the patient has tried and their outcomes.

## 2021-07-06 VITALS
DIASTOLIC BLOOD PRESSURE: 80 MMHG | RESPIRATION RATE: 18 BRPM | HEIGHT: 66 IN | BODY MASS INDEX: 36.64 KG/M2 | SYSTOLIC BLOOD PRESSURE: 124 MMHG | OXYGEN SATURATION: 98 % | WEIGHT: 228 LBS | HEART RATE: 84 BPM | TEMPERATURE: 98.7 F

## 2021-07-08 ASSESSMENT — ASTHMA QUESTIONNAIRES: ACT_TOTALSCORE: 24

## 2021-07-21 PROBLEM — R59.0 MEDIASTINAL ADENOPATHY: Status: ACTIVE | Noted: 2021-07-21

## 2021-07-21 PROBLEM — E66.01 SEVERE OBESITY (BMI 35.0-39.9) WITH COMORBIDITY (H): Status: ACTIVE | Noted: 2021-07-21

## 2021-07-21 PROBLEM — E55.9 VITAMIN D DEFICIENCY: Status: ACTIVE | Noted: 2021-07-21

## 2021-07-21 PROBLEM — F33.9 MAJOR DEPRESSION, RECURRENT (H): Status: ACTIVE | Noted: 2021-07-21

## 2021-07-21 PROBLEM — F41.1 GENERALIZED ANXIETY DISORDER: Status: ACTIVE | Noted: 2021-07-21

## 2021-07-21 PROBLEM — E78.00 PURE HYPERCHOLESTEROLEMIA: Status: ACTIVE | Noted: 2021-07-21

## 2021-07-21 PROBLEM — E88.819 INSULIN RESISTANCE: Status: ACTIVE | Noted: 2021-06-03

## 2021-07-21 PROBLEM — N80.9 ENDOMETRIOSIS: Status: ACTIVE | Noted: 2021-07-21

## 2021-07-21 PROBLEM — R06.83 SNORING: Status: ACTIVE | Noted: 2017-08-08

## 2021-07-21 PROBLEM — G54.0 THORACIC OUTLET SYNDROME: Status: ACTIVE | Noted: 2021-07-21

## 2021-07-21 PROBLEM — Z87.891 PERSONAL HISTORY OF TOBACCO USE, PRESENTING HAZARDS TO HEALTH: Status: ACTIVE | Noted: 2021-07-21

## 2021-07-22 ENCOUNTER — RECORDS - HEALTHEAST (OUTPATIENT)
Dept: FAMILY MEDICINE | Facility: CLINIC | Age: 51
End: 2021-07-22

## 2021-07-22 DIAGNOSIS — Z13.9 SCREENING FOR CONDITION: ICD-10-CM

## 2021-07-23 ENCOUNTER — OFFICE VISIT (OUTPATIENT)
Dept: FAMILY MEDICINE | Facility: CLINIC | Age: 51
End: 2021-07-23
Payer: COMMERCIAL

## 2021-07-23 DIAGNOSIS — F41.9 ANXIETY: ICD-10-CM

## 2021-07-23 DIAGNOSIS — L21.9 SEBORRHEIC DERMATITIS: ICD-10-CM

## 2021-07-23 DIAGNOSIS — Z12.31 VISIT FOR SCREENING MAMMOGRAM: ICD-10-CM

## 2021-07-23 DIAGNOSIS — L40.9 PSORIASIS: ICD-10-CM

## 2021-07-23 DIAGNOSIS — I10 BENIGN ESSENTIAL HYPERTENSION: ICD-10-CM

## 2021-07-23 DIAGNOSIS — Z00.00 HEALTHCARE MAINTENANCE: Primary | ICD-10-CM

## 2021-07-23 LAB
ALBUMIN SERPL-MCNC: 3.5 G/DL (ref 3.4–5)
ALP SERPL-CCNC: 77 U/L (ref 40–150)
ALT SERPL W P-5'-P-CCNC: 31 U/L (ref 0–50)
ANION GAP SERPL CALCULATED.3IONS-SCNC: 6 MMOL/L (ref 3–14)
AST SERPL W P-5'-P-CCNC: 14 U/L (ref 0–45)
BILIRUB SERPL-MCNC: 0.5 MG/DL (ref 0.2–1.3)
BUN SERPL-MCNC: 12 MG/DL (ref 7–30)
CALCIUM SERPL-MCNC: 9.3 MG/DL (ref 8.5–10.1)
CHLORIDE BLD-SCNC: 106 MMOL/L (ref 94–109)
CHOLEST SERPL-MCNC: 295 MG/DL
CO2 SERPL-SCNC: 28 MMOL/L (ref 20–32)
CREAT SERPL-MCNC: 0.6 MG/DL (ref 0.52–1.04)
ERYTHROCYTE [DISTWIDTH] IN BLOOD BY AUTOMATED COUNT: 14 % (ref 10–15)
FASTING STATUS PATIENT QL REPORTED: YES
GFR SERPL CREATININE-BSD FRML MDRD: >90 ML/MIN/1.73M2
GLUCOSE BLD-MCNC: 88 MG/DL (ref 70–99)
HCT VFR BLD AUTO: 40.3 % (ref 35–47)
HDLC SERPL-MCNC: 107 MG/DL
HGB BLD-MCNC: 13.5 G/DL (ref 11.7–15.7)
LDLC SERPL CALC-MCNC: 160 MG/DL
MCH RBC QN AUTO: 27.4 PG (ref 26.5–33)
MCHC RBC AUTO-ENTMCNC: 33.5 G/DL (ref 31.5–36.5)
MCV RBC AUTO: 82 FL (ref 78–100)
NONHDLC SERPL-MCNC: 188 MG/DL
PLATELET # BLD AUTO: 300 10E3/UL (ref 150–450)
POTASSIUM BLD-SCNC: 3.3 MMOL/L (ref 3.4–5.3)
PROT SERPL-MCNC: 7.5 G/DL (ref 6.8–8.8)
RBC # BLD AUTO: 4.92 10E6/UL (ref 3.8–5.2)
SODIUM SERPL-SCNC: 140 MMOL/L (ref 133–144)
TRIGL SERPL-MCNC: 141 MG/DL
WBC # BLD AUTO: 11.4 10E3/UL (ref 4–11)

## 2021-07-23 PROCEDURE — 99214 OFFICE O/P EST MOD 30 MIN: CPT | Mod: 25 | Performed by: FAMILY MEDICINE

## 2021-07-23 PROCEDURE — 85027 COMPLETE CBC AUTOMATED: CPT | Performed by: FAMILY MEDICINE

## 2021-07-23 PROCEDURE — 36415 COLL VENOUS BLD VENIPUNCTURE: CPT | Performed by: FAMILY MEDICINE

## 2021-07-23 PROCEDURE — 80053 COMPREHEN METABOLIC PANEL: CPT | Performed by: FAMILY MEDICINE

## 2021-07-23 PROCEDURE — 99396 PREV VISIT EST AGE 40-64: CPT | Performed by: FAMILY MEDICINE

## 2021-07-23 PROCEDURE — 80061 LIPID PANEL: CPT | Performed by: FAMILY MEDICINE

## 2021-07-23 RX ORDER — FLUOXETINE 10 MG/1
CAPSULE ORAL
Qty: 170 CAPSULE | Refills: 0 | Status: SHIPPED | OUTPATIENT
Start: 2021-07-23 | End: 2021-08-04

## 2021-07-23 RX ORDER — HYDROCODONE BITARTRATE AND ACETAMINOPHEN 10; 325 MG/1; MG/1
TABLET ORAL
COMMUNITY
Start: 2021-07-22 | End: 2024-07-10

## 2021-07-23 RX ORDER — METOPROLOL SUCCINATE 25 MG/1
25 TABLET, EXTENDED RELEASE ORAL DAILY
Qty: 90 TABLET | Refills: 0 | Status: SHIPPED | OUTPATIENT
Start: 2021-07-23 | End: 2021-08-13

## 2021-07-23 RX ORDER — ASPIRIN 81 MG/1
81 TABLET, CHEWABLE ORAL
COMMUNITY
End: 2022-01-20

## 2021-07-23 RX ORDER — KETOCONAZOLE 20 MG/ML
SHAMPOO TOPICAL
Qty: 120 ML | Refills: 1 | Status: SHIPPED | OUTPATIENT
Start: 2021-07-23

## 2021-07-23 RX ORDER — PREDNISONE 10 MG/1
TABLET ORAL
Qty: 15 TABLET | Refills: 0 | Status: SHIPPED | OUTPATIENT
Start: 2021-07-23 | End: 2021-08-02

## 2021-07-23 RX ORDER — PREDNISONE 20 MG/1
TABLET ORAL
COMMUNITY
Start: 2021-07-20 | End: 2022-01-20

## 2021-07-23 ASSESSMENT — ENCOUNTER SYMPTOMS
CONSTIPATION: 0
COUGH: 0
SHORTNESS OF BREATH: 0
BREAST MASS: 0
SORE THROAT: 0
NERVOUS/ANXIOUS: 1
JOINT SWELLING: 1
EYE PAIN: 1
DYSURIA: 0
MYALGIAS: 1
ABDOMINAL PAIN: 0
DIARRHEA: 1
PARESTHESIAS: 1
WEAKNESS: 1
HEMATOCHEZIA: 0
DIZZINESS: 1
HEMATURIA: 0
NAUSEA: 1
CHILLS: 0
ARTHRALGIAS: 1
FREQUENCY: 0
HEADACHES: 1
FEVER: 0
HEARTBURN: 1
PALPITATIONS: 0

## 2021-07-23 ASSESSMENT — PATIENT HEALTH QUESTIONNAIRE - PHQ9
SUM OF ALL RESPONSES TO PHQ QUESTIONS 1-9: 22
SUM OF ALL RESPONSES TO PHQ QUESTIONS 1-9: 22
10. IF YOU CHECKED OFF ANY PROBLEMS, HOW DIFFICULT HAVE THESE PROBLEMS MADE IT FOR YOU TO DO YOUR WORK, TAKE CARE OF THINGS AT HOME, OR GET ALONG WITH OTHER PEOPLE: EXTREMELY DIFFICULT

## 2021-07-23 ASSESSMENT — MIFFLIN-ST. JEOR: SCORE: 1668.68

## 2021-07-23 NOTE — PROGRESS NOTES
"Answers for HPI/ROS submitted by the patient on 7/23/2021  If you checked off any problems, how difficult have these problems made it for you to do your work, take care of things at home, or get along with other people?: Extremely difficult  PHQ9 TOTAL SCORE: 22  Frequency of exercise:: 2-3 days/week  Getting at least 3 servings of Calcium per day:: Yes  Diet:: Regular (no restrictions)  Taking medications regularly:: Yes  Medication side effects:: Not applicable  Bi-annual eye exam:: NO  Dental care twice a year:: Yes  Sleep apnea or symptoms of sleep apnea:: None, Excessive snoring  abdominal pain: No  Blood in stool: No  Blood in urine: No  chest pain: Yes  chills: No  congestion: No  constipation: No  cough: No  diarrhea: Yes  dizziness: Yes  eye pain: Yes  nervous/anxious: Yes  fever: No  frequency: No  genital sores: No  headaches: Yes  hearing loss: No  heartburn: Yes  arthralgias: Yes  joint swelling: Yes  peripheral edema: Yes  mood changes: Yes  myalgias: Yes  nausea: Yes  dysuria: No  palpitations: No  Skin sensation changes: Yes  sore throat: No  urgency: No  rash: Yes  shortness of breath: No  visual disturbance: No  weakness: Yes  pelvic pain: No  vaginal bleeding: No  vaginal discharge: No  tenderness: No  breast mass: No  breast discharge: No  Additional concerns today:: Yes  Duration of exercise:: 30-45 minutes        Assessment/Plan:     Health maintenance female exam.  All questions answered.  Breast self exam technique reviewed and patient encouraged to perform self-exam monthly.  Discussed healthy lifestyle modifications.  Mammogram ordered.  Await fasting lab results    BMI:   Estimated body mass index is 36.72 kg/m  as calculated from the following:    Height as of this encounter: 1.676 m (5' 6\").    Weight as of this encounter: 103.2 kg (227 lb 8 oz).   Weight management plan: Discussed healthy diet and exercise guidelines      Healthcare maintenance  - Lipid panel reflex to direct LDL " Fasting  - Comprehensive metabolic panel (BMP + Alb, Alk Phos, ALT, AST, Total. Bili, TP)  - CBC with platelets  - Lipid panel reflex to direct LDL Fasting  - Comprehensive metabolic panel (BMP + Alb, Alk Phos, ALT, AST, Total. Bili, TP)  - CBC with platelets    Visit for screening mammogram  - *MA Screening Digital Bilateral    Anxiety  We will initiate Prozac today.  Discussed risk and benefits of the medication.  She does not need a refill of hydroxyzine or Xanax currently.  - FLUoxetine (PROZAC) 10 MG capsule  Dispense: 170 capsule; Refill: 0    Psoriasis  Continue the prednisone but in taper.  When she is done with 20 mg twice daily she will do 20 mg once daily for 5 days and then 10 mg daily for 5 days to see if this will be beneficial.  If rash flares again I would recommend that she follow with dermatology.  I also recommended that she follow with her rheumatologist for her psoriatic arthritis.  - predniSONE (DELTASONE) 10 MG tablet  Dispense: 15 tablet; Refill: 0    Benign essential hypertension  Low-dose metoprolol sent to the pharmacy.  This may help with her anxiety as well.  - metoprolol succinate ER (TOPROL-XL) 25 MG 24 hr tablet  Dispense: 90 tablet; Refill: 0    Seborrheic dermatitis  Refill  - ketoconazole (NIZORAL) 2 % external shampoo  Dispense: 120 mL; Refill: 1        Patient has been advised of split billing requirements and indicates understanding: Yes      Subjective:     Sonya Mosqueda is a 50 year old female who presents for an annual exam.  She is unfortunately having some issues recently.    1.  Rash/psoriasis: She has a history of psoriasis and is currently not taking any medications to prevent flares.  Patient has a fairly significant rash throughout her body does not present several weeks.  She was seen at urgent care and started on a 5-day course of prednisone.  She states that it is a bit better but she only has few days of prednisone left and is worried that it might flare again.  She  states that it is improving daily.  It is very itchy.  She thinks that this was caused by stress.  No new contacts that she is aware of.    2.  Hypertension: Patient states that when she checks her blood pressures at home are generally in the 140s over 90s.  She states that this was even before getting on her prednisone.  She recently quit her job and thinks this might help her blood pressures are wonders about her medication in the meantime.    3.  Anxiety: Patient has a history of anxiety.  She is currently on Cymbalta but does not feel as though it helps her anxiety.  She is wondering about doing a trial of Prozac.  She is having some intermittent panic attacks.  She does have hydroxyzine and Xanax at home which she uses occasionally.  She believes that being done with her job will be helpful for this as well.            Healthy Habits:   Regular Exercise: Yes  Sunscreen Use: Yes  Healthy Diet: yes  Dental Visits Regularly: yes  Seat Belt: Yes  Self Breast Exam Monthly: yes  Colonoscopy: Has cologuard at home  Prevention of Osteoporosis: yes      Immunization History   Administered Date(s) Administered     COVID-19,PF,Chirag 2021     DT (PEDS <7y) 2000     HepA-Adult 2007, 2008     Influenza (IIV3) PF 2008, 2011, 2012     Influenza Vaccine IM > 6 months Valent IIV4 10/20/2017, 10/25/2018, 10/28/2019     Pneumococcal 23 valent 2011     TD (ADULT, 7+) 2004, 2019     Td (Adult), Adsorbed 2000     Tdap (Adacel,Boostrix) 2009         Gynecologic History  Patient's last menstrual period was 2009.  Contraception: status post hysterectomy  Last Pap: . Results were: normal  Last mammogram: . Results were: normal      OB History    Para Term  AB Living   2 2 2 0 0 0   SAB TAB Ectopic Multiple Live Births   0 0 0 0 0      # Outcome Date GA Lbr Alex/2nd Weight Sex Delivery Anes PTL Lv   2 Term            1 Term                 Current Outpatient Medications   Medication Sig Dispense Refill     FLUoxetine (PROZAC) 10 MG capsule Take 1 capsule (10 mg) by mouth daily for 10 days, THEN 2 capsules (20 mg) daily. 170 capsule 0     ketoconazole (NIZORAL) 2 % external shampoo LATHER ON SCALP, LEAVE ON FOR 5 MINUTES AND RINSE 120 mL 1     metoprolol succinate ER (TOPROL-XL) 25 MG 24 hr tablet Take 1 tablet (25 mg) by mouth daily 90 tablet 0     predniSONE (DELTASONE) 10 MG tablet Take 2 tablets (20 mg) by mouth daily for 5 days, THEN 1 tablet (10 mg) daily for 5 days. 15 tablet 0     albuterol (VENTOLIN HFA) 108 (90 Base) MCG/ACT inhaler INHALE ONE OR TWO PUFFS BY MOUTH EVERY FOUR HOURS AS NEEDED FOR WHEEZING 1 Inhaler 1     ALPRAZolam (XANAX) 0.5 MG tablet Take 1 tablet (0.5 mg) by mouth nightly as needed for anxiety 20 tablet 0     aspirin (ASA) 81 MG chewable tablet Take 81 mg by mouth       beclomethasone HFA (QVAR REDIHALER) 40 MCG/ACT inhaler USE 1 INHALATION BY MOUTH  TWICE DAILY       Biotin 5 MG TBDP Take 1 tablet by mouth       cholecalciferol 50 MCG (2000 UT) tablet Take 1 capsule by mouth       estradiol (ESTRACE) 0.5 MG tablet Take 1 tablet (0.5 mg) by mouth daily 90 tablet 1     Fiber, Corn Dextrin, POWD Take 1 tablet by mouth       HYDROcodone-acetaminophen (NORCO)  MG per tablet        hydrOXYzine (ATARAX) 25 MG tablet TAKE 1 2 TABLETS BY MOUTH EVERY 6 HOURS AS NEEDED       IBUPROFEN 200 200 MG OR TABS 1 TABLET EVERY 4 TO 6 HOURS AS NEEDED       magnesium 200 MG TABS Take 1 tablet by mouth       montelukast (SINGULAIR) 10 MG tablet Take 1 tablet (10 mg) by mouth At Bedtime 30 tablet 0     multivitamin w/minerals (MULTI-VITAMIN) tablet Take 1 tablet by mouth       OMEGA-3 FATTY ACIDS-VITAMIN E PO Take 1 capsule by mouth       omeprazole (PRILOSEC) 20 MG DR capsule Take 20 mg by mouth       pravastatin (PRAVACHOL) 10 MG tablet Take 1 tablet (10 mg) by mouth daily 90 tablet 1     predniSONE (DELTASONE) 20 MG tablet TAKE 2  TABLETS BY MOUTH EVERY DAY FOR 5 DAYS       ZYRTEC 10 MG OR TABS 1 TABLET DAILY       Past Medical History:   Diagnosis Date     Anxiety disorder      Calculus of kidney      Calculus of kidney     Created by Conversion      Dysthymic disorder      Endometriosis     Created by Conversion  Replacement Utility updated for latest IMO load     Endometriosis, site unspecified      Fibromyalgia      GERD (gastroesophageal reflux disease)      HLD (hyperlipidemia)      Myalgia and myositis, unspecified      Spontaneous      Created by Conversion  Replacement Utility updated for latest IMO load     Past Surgical History:   Procedure Laterality Date     C TOTAL ABDOM HYSTERECTOMY      Description: Hysterectomy;  Proc Date: 2013;  Comments: both ovaries gone     CRYOCAUTERY OF CERVIX      Description: Cervix Cryosurgery;  Recorded: 2007;     HC DILATION/CURETTAGE DIAG/THER NON OB      Description: Dilation And Curettage;  Recorded: 2007;     HC REVISE MEDIAN N/CARPAL TUNNEL SURG      Description: Neuroplasty Decompression Median Nerve At Carpal Tunnel;  Recorded: 2007;     HYSTERECTOMY       HYSTERECTOMY  2013     OOPHORECTOMY Right 2013     SURGICAL HISTORY OF -   1987    laporoscopy     SURGICAL HISTORY OF -       cryosurgery x 2     Small pox vaccine, Bupropion, Cephalosporins, and Pcn [penicillins]  Family History   Problem Relation Age of Onset     Breast Cancer Paternal Grandmother 60.00     Cerebrovascular Disease Mother      Coronary Artery Disease Mother      Hypertension Mother      Atrial fibrillation Father      Thyroid Cancer No family hx of      Social History     Socioeconomic History     Marital status:      Spouse name: Not on file     Number of children: Not on file     Years of education: Not on file     Highest education level: Not on file   Occupational History     Not on file   Tobacco Use     Smoking status: Former Smoker     Packs/day: 1.00     Years: 20.00      "Pack years: 20.00     Types: Cigarettes     Smokeless tobacco: Never Used     Tobacco comment: quit 2006   Substance and Sexual Activity     Alcohol use: No     Drug use: Not on file     Sexual activity: Yes     Partners: Male     Birth control/protection: Surgical   Other Topics Concern     Not on file   Social History Narrative        The 10-year ASCVD risk score (Franko LANDRY Jr et al., 2013) is: 0.8%    Values used to calculate the score:      Age: 47 years      Sex: Female      Is Non- : No      Diabetic: No      Tobacco smoker: No      Systolic Blood Pressu re: 126 mmHg      Is BP treated: No      HDL Cholesterol: 69 mg/dL      Total Cholesterol: 231 mg/dL       Social Determinants of Health     Financial Resource Strain:      Difficulty of Paying Living Expenses:    Food Insecurity:      Worried About Running Out of Food in the Last Year:      Ran Out of Food in the Last Year:    Transportation Needs:      Lack of Transportation (Medical):      Lack of Transportation (Non-Medical):    Physical Activity:      Days of Exercise per Week:      Minutes of Exercise per Session:    Stress:      Feeling of Stress :    Social Connections:      Frequency of Communication with Friends and Family:      Frequency of Social Gatherings with Friends and Family:      Attends Jew Services:      Active Member of Clubs or Organizations:      Attends Club or Organization Meetings:      Marital Status:    Intimate Partner Violence:      Fear of Current or Ex-Partner:      Emotionally Abused:      Physically Abused:      Sexually Abused:        Review of Systems  12 point review of systems was completed and found to be negative except for what is been stated above.      Objective:      Vitals:    07/23/21 0815 07/23/21 0840   BP: (!) 138/100 (!) 136/92   Pulse: 68    Resp: 12    Temp: 98.1  F (36.7  C)    TempSrc: Tympanic    Weight: 103.2 kg (227 lb 8 oz)    Height: 1.676 m (5' 6\")          Physical " Exam:  General Appearance: Alert, cooperative, no distress, appears stated age   Head: Normocephalic, without obvious abnormality, atraumatic  Eyes: PERRL, conjunctiva/corneas clear, EOM's intact   Ears: Normal TM's and external ear canals, both ears  Neck: Supple, symmetrical, trachea midline, no adenopathy;  thyroid: not enlarged, symmetric, no tenderness/mass/nodules  Back: Symmetric, no curvature, ROM normal,  Lungs: Clear to auscultation bilaterally, respirations unlabored  Breasts: No breast masses, tenderness, asymmetry, or nipple discharge.  Heart: Regular rate and rhythm, S1 and S2 normal, no murmur, rub, or gallop  Abdomen: Soft, non-tender, bowel sounds active all four quadrants,  no masses, no organomegaly  Extremities: Extremities normal, atraumatic, no cyanosis or edema  Skin: Skin color, texture, turgor normal, no rashes or lesions  Lymph nodes: Cervical, supraclavicular, and axillary nodes normal and   Neurologic: Normal

## 2021-07-24 VITALS
TEMPERATURE: 98.1 F | RESPIRATION RATE: 12 BRPM | BODY MASS INDEX: 36.56 KG/M2 | HEART RATE: 68 BPM | WEIGHT: 227.5 LBS | HEIGHT: 66 IN | DIASTOLIC BLOOD PRESSURE: 92 MMHG | SYSTOLIC BLOOD PRESSURE: 136 MMHG

## 2021-07-26 ASSESSMENT — ANXIETY QUESTIONNAIRES
1. FEELING NERVOUS, ANXIOUS, OR ON EDGE: NEARLY EVERY DAY
GAD7 TOTAL SCORE: 20
IF YOU CHECKED OFF ANY PROBLEMS ON THIS QUESTIONNAIRE, HOW DIFFICULT HAVE THESE PROBLEMS MADE IT FOR YOU TO DO YOUR WORK, TAKE CARE OF THINGS AT HOME, OR GET ALONG WITH OTHER PEOPLE: VERY DIFFICULT
3. WORRYING TOO MUCH ABOUT DIFFERENT THINGS: NEARLY EVERY DAY
6. BECOMING EASILY ANNOYED OR IRRITABLE: NEARLY EVERY DAY
5. BEING SO RESTLESS THAT IT IS HARD TO SIT STILL: NEARLY EVERY DAY
2. NOT BEING ABLE TO STOP OR CONTROL WORRYING: NEARLY EVERY DAY
7. FEELING AFRAID AS IF SOMETHING AWFUL MIGHT HAPPEN: MORE THAN HALF THE DAYS

## 2021-07-26 ASSESSMENT — PATIENT HEALTH QUESTIONNAIRE - PHQ9
SUM OF ALL RESPONSES TO PHQ QUESTIONS 1-9: 22
5. POOR APPETITE OR OVEREATING: NEARLY EVERY DAY

## 2021-07-27 ASSESSMENT — ANXIETY QUESTIONNAIRES: GAD7 TOTAL SCORE: 20

## 2021-08-04 ENCOUNTER — MYC MEDICAL ADVICE (OUTPATIENT)
Dept: FAMILY MEDICINE | Facility: CLINIC | Age: 51
End: 2021-08-04

## 2021-08-04 DIAGNOSIS — E78.5 HYPERLIPIDEMIA LDL GOAL <130: ICD-10-CM

## 2021-08-04 DIAGNOSIS — E78.5 HYPERLIPIDEMIA LDL GOAL <130: Primary | ICD-10-CM

## 2021-08-04 DIAGNOSIS — F41.9 ANXIETY: ICD-10-CM

## 2021-08-04 RX ORDER — PRAVASTATIN SODIUM 20 MG
20 TABLET ORAL DAILY
Qty: 90 TABLET | Refills: 2 | Status: SHIPPED | OUTPATIENT
Start: 2021-08-04 | End: 2021-08-13

## 2021-08-04 RX ORDER — PRAVASTATIN SODIUM 10 MG
TABLET ORAL
Qty: 90 TABLET | Refills: 3 | OUTPATIENT
Start: 2021-08-04

## 2021-08-04 NOTE — TELEPHONE ENCOUNTER
pravastatin (PRAVACHOL) 20 MG tablet 90 tablet 2 8/4/2021  --   Sig - Route: Take 1 tablet (20 mg) by mouth daily - Oral   Sent to pharmacy as: Pravastatin Sodium 20 MG Oral Tablet (PRAVACHOL)   Class: E-Prescribe   Order: [id number]   E-Prescribing Status: Receipt confirmed by pharmacy (8/4/2021 11:26 AM CDT)   Printout Tracking    External Result Report   Pharmacy    [facility] - [place], [place] - [address] [place] [address] [address]

## 2021-08-04 NOTE — PROGRESS NOTES
"RHEUMATOLOGY FOLLOW-UP NOTE    Chief Complaint:    Chief Complaint   Patient presents with     RECHECK     follow-up on flare-up/ last day on prednisone/ looking to take meds again stopped during covid       Interval History:    -she was seen in UC on 7/20 for diffuse rash felt to be 2/2 allergic reaction, she was started on prednisone . Her prednisone was continued by PCP for her skin concerns -she feels she is having a psoriasis flare. Prednisone did not help her joint pain. She does not have active joint pain at this time. She denies any joint swelling. She describes her pain as waxing and waning, not persistent. Denies any dactylitis or enthesitis  -she reports having some flares related to her skin -not sure  -she was TCO for L shoulder pain and restricted ROM around 2.5 months, s/p corticosteroid injection. She is not sure if the injection helped but her symptoms resolved after a few weeks  -she reports having some jaw pain  -she takes ibuprofen BID  -denies fevers, recent infections or hospitalizations    HPI (per initial consult note on 4/6/21) :   Sonya Mosqueda is a 50 year old female with pmhx psoriasis, fibromyalgia, PsA, pulmonary nodule w/ non-necrotizing granulomatous inflammation in 2017, CTS s/p surgery, is referred to rheumatology clinic for PsA.        Last rheumatology visit was 2018 with Dr Herminia Alcantara. She is not on any medications at this time. She reports pain over her b/l hands and wrists more consistently. She feels like she can't hold things. These symptoms have gotten worse over the last 3 months. She feels like she does not have enough strength in her hands. She gets intermittent pain over her shoulders, hips off an on but that is not consistent. She has been using diclofenac gel over the hands but it has not helped too much. She feels there is some swelling over the wrists and hand joints and feels like there are \"bone spurs\" coming out. She reports some AM stiffness which lasts " "minutes to sometimes an hour, but she goes for a walk right away with her dogs. Occasionally, her hand stiffness lasts all day long. She reports lower back pain, which bothers her the most in the morning. She reports associated stiffness in back which is present ~2 hours. It is difficult to bend down to wear shoes in AM. Back pain does not wake her up at night. Back pain is better with activity. Her back pain has been present for \"a while\" but she feels it has become more normal. She gets pain over the dorsum of the feet, denies heel pain. Gets occasional ankle pain. Denies enthesitis. She reports \"sausage digits\" over her fingers and toes. She takes PRN ibuprofen. She gets prescription pain meds from pain clinic. Ibuprofen dulls the pain. She finds tylenol helping with longer relief.       She was diagnosed with psoriasis around 2017. She feels like her skin symptoms are pretty well controlled. Currently, she has mild spots over her neck and L ear. She has some nail \"symptoms\" over her fingernails and toenails. She is only using ketoconazole shampoo and hydrocortisone cream over neck. She sees Dr Spencer for her psoriasis. She reports she had a biopsy of her scalp many years ago.        Denies fevers, chills, weight loss, +night sweats and hot flashes which she attributes to perimenopausal changes, denies vision changes, eye pain/redness, dry eyes, dry mouth, oral/nasal ulcers, hair loss/thinning, rash, raynaud's, cough, SOB, pleurisy, chest pain, edema, heartburn, difficulty swallowing, abdominal pain, diarrhea, hematochezia, melena, dysuria, recurrent genital ulcers. No hx of blood clots.       Pertinent labs and imaging: (per chart review in [facility] and care everywhere)       Labs:    2018: negative ESR, CRP   2013: negative ZAIDA, RF, CCP, SSA, SSB, Sm, Sm/RNP, Scl70, Casandra-1, cardiolipin abs       Rheumatological History:   Previous Rheumatologist(s): Dr Herminia Alcantara, Dr Nuñez, Dr Anand, Dr Tejada, Dr Azevedo, " Dr Ventura   Last rheum visit: 12/13/18   Current treatment: none   Past treatments: apremilast (severe HA) -pt states she did not give the medication enough chance as she was traveling around that time, etanercept -helped her skin but did not notice a big difference in her joint symptoms; she stopped it when COVID-19 started she took it for several months, SSZ (does not remember what happened), methotrexate (not as efficacious)      Medications:  Current Outpatient Medications   Medication Sig Dispense Refill     albuterol (VENTOLIN HFA) 108 (90 Base) MCG/ACT inhaler INHALE ONE OR TWO PUFFS BY MOUTH EVERY FOUR HOURS AS NEEDED FOR WHEEZING 1 Inhaler 1     ALPRAZolam (XANAX) 0.5 MG tablet Take 1 tablet (0.5 mg) by mouth nightly as needed for anxiety 20 tablet 0     aspirin (ASA) 81 MG chewable tablet Take 81 mg by mouth       beclomethasone HFA (QVAR REDIHALER) 40 MCG/ACT inhaler USE 1 INHALATION BY MOUTH  TWICE DAILY       Biotin 5 MG TBDP Take 1 tablet by mouth       cholecalciferol 50 MCG (2000 UT) tablet Take 1 capsule by mouth       estradiol (ESTRACE) 0.5 MG tablet Take 1 tablet (0.5 mg) by mouth daily 90 tablet 1     Fiber, Corn Dextrin, POWD Take 1 tablet by mouth       FLUoxetine (PROZAC) 20 MG capsule Take 1 capsule (20 mg) by mouth daily 90 capsule 2     HYDROcodone-acetaminophen (NORCO)  MG per tablet        hydrOXYzine (ATARAX) 25 MG tablet TAKE 1 2 TABLETS BY MOUTH EVERY 6 HOURS AS NEEDED       IBUPROFEN 200 200 MG OR TABS 1 TABLET EVERY 4 TO 6 HOURS AS NEEDED       ketoconazole (NIZORAL) 2 % external shampoo LATHER ON SCALP, LEAVE ON FOR 5 MINUTES AND RINSE 120 mL 1     magnesium 200 MG TABS Take 1 tablet by mouth       metoprolol succinate ER (TOPROL-XL) 25 MG 24 hr tablet Take 1 tablet (25 mg) by mouth daily 90 tablet 0     montelukast (SINGULAIR) 10 MG tablet Take 1 tablet (10 mg) by mouth At Bedtime 30 tablet 0     multivitamin w/minerals (MULTI-VITAMIN) tablet Take 1 tablet by mouth        "OMEGA-3 FATTY ACIDS-VITAMIN E PO Take 1 capsule by mouth       omeprazole (PRILOSEC) 20 MG DR capsule Take 20 mg by mouth       pravastatin (PRAVACHOL) 20 MG tablet Take 1 tablet (20 mg) by mouth daily 90 tablet 2     predniSONE (DELTASONE) 20 MG tablet TAKE 2 TABLETS BY MOUTH EVERY DAY FOR 5 DAYS       ZYRTEC 10 MG OR TABS 1 TABLET DAILY         PHYSICAL EXAMINATION:   Vital signs:  BP (!) 140/92 (BP Location: Left arm, Patient Position: Sitting, Cuff Size: Adult Regular)   Pulse 83   Ht 1.676 m (5' 6\")   Wt 103.6 kg (228 lb 6.4 oz)   LMP 06/20/2009   SpO2 95%   BMI 36.86 kg/m       MSK: no joint tenderness, no joint warmth/erythema, no joint effusions appreciated on exam, b/l pedal edema, no dactylitis or enthesitis, no nail pitting  Skin: +scattered psoriatic lesions over UEs and in between the finger webs, +nail ridges   HEENT: MMM, no oral ulcers/lesions, no alopecia  CV: RRR  Pulm: CTAB, non-labored respirations, no c/r/w  GI: +BS, soft, no TTP  Neuro: A&O x3, no focal deficits    Labs:      I have reviewed all pertinent investigations including labs, including outside records if relevant    RF/CCP  Recent Labs   Lab Test 04/16/21  1154   CCPIGG 1   RHF 8     CBC  Recent Labs   Lab Test 07/23/21  0853 04/16/21  1154 12/30/19  1618 09/24/19  1301   WBC 11.4* 6.1 7.9 6.8   RBC 4.92 4.75 4.52 5.00   HGB 13.5 13.0 12.5 13.7   HCT 40.3 38.7 36.8 40.5   MCV 82 82 81 81   RDW 14.0 13.4 12.0 13.5    265 262 317   MCH 27.4 27.4 27.6 27.4   MCHC 33.5 33.6 33.9 33.8   NEUTROPHIL  --  46.9  --  53   LYMPH  --  43.2  --  38   MONOCYTE  --  8.0  --  7   EOSINOPHIL  --  1.6  --  1   BASOPHIL  --  0.3  --  0   ANEU  --  2.9  --   --    ALYM  --  2.6  --   --    JONATHON  --  0.5  --   --    AEOS  --  0.1  --   --    ABAS  --  0.0  --   --      CMP  Recent Labs   Lab Test 07/23/21  0853 04/16/21  1154 12/30/19  1618 09/24/19  1301 11/20/13  1222    140 141 140  --    POTASSIUM 3.3* 3.8 3.6 3.4*  --    CHLORIDE " 106 106 104 104  --    CO2 28 27 31 27  --    ANIONGAP 6 7 6 9  --    GLC 88 91 88 86  --    BUN 12 14 15 9  --    CR 0.60 0.63 0.65 0.70  --    GFRESTIMATED >90 >90 >60 >60  --    GFRESTBLACK  --  >90 >60 >60  --    KENRICK 9.3 9.3 9.6 10.0  --    BILITOTAL 0.5 0.4 0.3  --   --    ALBUMIN 3.5 3.7 3.7  --   --    PROTTOTAL 7.5 7.6 6.8  --  7.3   ALKPHOS 77 64 60  --   --    AST 14 23 26  --   --    ALT 31 42 28  --   --      HgA1c  Recent Labs   Lab Test 06/03/21  0818 08/08/17  0753   A1C 5.0 5.5     Calcium/VitaminD  Recent Labs   Lab Test 07/23/21  0853 04/16/21  1154 12/30/19  1618   KENRICK 9.3 9.3 9.6     ESR/CRP  Recent Labs   Lab Test 04/16/21  1154   SED 9   CRP <2.9     TSH/T4  Recent Labs   Lab Test 12/30/19  1618   TSH 1.84     Lipid Panel  Recent Labs   Lab Test 07/23/21  0853 06/26/17  0828 09/02/15  1351   CHOL 295* 231* 191   TRIG 141 147 73    69 64   * 133* 112   NHDL 188*  --   --      Hepatitis B  Recent Labs   Lab Test 04/16/21  1154 12/22/16  0857   AUSAB >1,000.00* Positive*   HBCAB Nonreactive Negative   HEPBANG Nonreactive Negative     Hepatitis C  Recent Labs   Lab Test 04/16/21  1154 12/22/16  0857   HCVAB Nonreactive Negative       Imaging:    I have reviewed all pertinent investigations including imaging, including outside records if relevant    XR b/l hand (4/16/21)                                                                     IMPRESSION: Mild left and minimal right thumb CMC degenerative   arthrosis. Otherwise unremarkable. No erosions identified.       XR b/l foot (4/16/21)   IMPRESSION:   1. Right: Mild first MTP osteoarthritis. Slight hallux valgus. No   erosions identified.   2. Left: Slight hallux valgus. Plantar calcaneal spurring. No erosions   Identified.       XR SI joints (4/16/21)                                                                   IMPRESSION: Unremarkable exam       XR L spine (4/16/21)   IMPRESSION: Five lumbar-type vertebrae. Normal alignment.  Vertebral   body heights normal. No fractures. No evidence for fusion of the   articular pillars of the lumbar spine. Facet arthropathy at L5-S1.   Degenerative endplate spurring at L5-S1. No other significant   degenerative change. The sacroiliac joints bilaterally appear patent   and normally aligned.       12/22/216:   SI Joints XR    hx of Psoriasis, no with inflammatory joint pain, eval for bony changesCOMPARISON: None.FINDINGS: Minimal degenerative change of both SI joints. No signs of sacroiliitis. Degenerative   change lower lumbar spine.       XR b/l hands    Hand appear normal bilaterally, no erosive arthritic changes.There may be some degree of negative ulnar variance bilaterally which would be better evaluated on a wrist series. Question slight chronic cystic change involving the left capitate with the other visualized carpal bones appearing normal.       XR b/l feet   FINDINGS: Mild osteoarthritis involving each first MTP joint, right worse than left. Feet otherwise   appear normal. No erosive or inflammatory arthritic changes evident.       XR b/l knee   No significant degenerative changes.. No fracture or dislocation. No joint effusion.       Assessment / Plan:    Sonya Mosqueda is a very pleasant 50 year old female with pmhx psoriasis, fibromyalgia, PsA, pulmonary nodule w/ non-necrotizing granulomatous inflammation in 2017, CTS s/p surgery, is presenting for follow-up visit for PsA. Any prior notes, outside records, laboratory results, and imaging studies were reviewed if relevant. Pertinent work-up thus far includes negative ZAIDA, RF, CCP in 2013. Negative ESR, CRP in 2018. Prior plain films from 2016 without erosive disease. On my evaluation, she has negative RF, CCP, ESR, CRP. She has seen four other rheumatologists prior to me and has tried a few different medications such as methotrexate, SSZ, apremilast and etanercept in the past, stopped due to either lack of efficacy in combination with lack of  consistent treatment and follow-up. There has been a question of component of fibromyalgia and per prior rheumatology notes, her diagnosis of PsA has not been definitive. She has been off treatment for around 1.5 years. I do not appreciate any active signs of PsA -no synovitis, joint effusions, warmth, erythema, dactylitis or enthesitis. Updated plain films without findings of inflammatory arthritis, despite being off treatment. She was recently started on prednisone for a diffuse rash, which she did not find beneficial for her arthralgia. She is currently asymptomatic. Her symptoms seem to wax and wane, which is atypical for PsA. I discussed with her that without appropriate/consistent treatment, one would expect PsA to progress and be active. She has active psoriatic lesions on her UEs today. I have advised her to follow-up with dermatology. From a rheumatology standpoint, I do not see an indication to start IS based on her exam today and based on her recent imaging. She may need systemic therapy from psoriasis standpoint, I would recommend discussing with dermatology.       1) Polyarthralgia   -negative RF, CCP, Hep B and C, TB quant, ESR, CRP on 4/16/21   -negative CXR 4/16/21   -plain films of SI joints, b/l hand, b/l foot, L spine unremarkable for erosions or inflammatory changes   -no active signs/symptoms of PsA. Has been on prednisone recently for rash, did not find any benefit/difference in terms of arthralgia symptoms  -will hold off on starting IS therapy at this time  -she is advised to inform me if she has any persistent joint swelling, enthesitis or dactylitis  -discussed obtaining b/l knee plain films today as she has occasional knee pain. She prefers to hold off as her symptoms are not significant at this time    2) Psoriasis  -recommended follow-up with dermatology, she sees Dr Johanna Mosqueda verbalized agreement with and understanding of the rationale for the diagnosis and treatment  plan.  All questions were answered to best of my ability and the patient's satisfaction. Ms. Mosqueda was advised to contact the clinic with any questions that may arise after the clinic visit.        Chart documentation done in part with Dragon Voice recognition Software. Although reviewed after completion, some word and grammatical error may remain.      RTC PRDAVID Garduno MD

## 2021-08-06 ENCOUNTER — OFFICE VISIT (OUTPATIENT)
Dept: RHEUMATOLOGY | Facility: CLINIC | Age: 51
End: 2021-08-06
Payer: COMMERCIAL

## 2021-08-06 VITALS
BODY MASS INDEX: 36.71 KG/M2 | OXYGEN SATURATION: 95 % | DIASTOLIC BLOOD PRESSURE: 92 MMHG | HEART RATE: 83 BPM | WEIGHT: 228.4 LBS | HEIGHT: 66 IN | SYSTOLIC BLOOD PRESSURE: 140 MMHG

## 2021-08-06 DIAGNOSIS — M25.50 POLYARTHRALGIA: Primary | ICD-10-CM

## 2021-08-06 PROCEDURE — 99213 OFFICE O/P EST LOW 20 MIN: CPT | Performed by: STUDENT IN AN ORGANIZED HEALTH CARE EDUCATION/TRAINING PROGRAM

## 2021-08-06 ASSESSMENT — PAIN SCALES - GENERAL: PAINLEVEL: MILD PAIN (2)

## 2021-08-06 ASSESSMENT — MIFFLIN-ST. JEOR: SCORE: 1672.77

## 2021-08-06 NOTE — PROGRESS NOTES
Sonya Mosqueda's goals for this visit include:   Chief Complaint   Patient presents with     RECHECK     follow-up on flare-up/ last day on prednisone/ looking to take meds again stopped during covid       She requests these members of her care team be copied on today's visit information: yes    PCP: Lamar Mejía    Referring Provider:  No referring provider defined for this encounter.    LMP 06/20/2009     Do you need any medication refills at today's visit? No  Andreas Leung CMA

## 2021-08-17 ENCOUNTER — ALLIED HEALTH/NURSE VISIT (OUTPATIENT)
Dept: FAMILY MEDICINE | Facility: CLINIC | Age: 51
End: 2021-08-17
Payer: COMMERCIAL

## 2021-08-17 VITALS
TEMPERATURE: 98.4 F | DIASTOLIC BLOOD PRESSURE: 82 MMHG | HEART RATE: 81 BPM | OXYGEN SATURATION: 98 % | RESPIRATION RATE: 16 BRPM | SYSTOLIC BLOOD PRESSURE: 130 MMHG

## 2021-08-17 DIAGNOSIS — Z01.30 BP CHECK: Primary | ICD-10-CM

## 2021-08-17 DIAGNOSIS — F41.9 ANXIETY: ICD-10-CM

## 2021-08-17 PROCEDURE — 99207 PR NO CHARGE NURSE ONLY: CPT

## 2021-08-17 NOTE — TELEPHONE ENCOUNTER
Pt was in the clinic today to recheck BP and would like to have a refill : ALPRAZolam (XANAX) 0.5 MG tablet -Sent to [facility] in [place] please.  Route to the RN/PCP

## 2021-08-18 RX ORDER — ALPRAZOLAM 0.5 MG
0.5 TABLET ORAL
Qty: 20 TABLET | Refills: 0 | Status: SHIPPED | OUTPATIENT
Start: 2021-08-18 | End: 2021-09-30

## 2021-09-04 ENCOUNTER — HEALTH MAINTENANCE LETTER (OUTPATIENT)
Age: 51
End: 2021-09-04

## 2021-09-10 DIAGNOSIS — K21.9 ACID REFLUX: Primary | ICD-10-CM

## 2021-09-10 DIAGNOSIS — K21.00 GASTROESOPHAGEAL REFLUX DISEASE WITH ESOPHAGITIS WITHOUT HEMORRHAGE: ICD-10-CM

## 2021-09-10 NOTE — TELEPHONE ENCOUNTER
Routing refill request to provider for review/approval because:  Drug interaction warning  Sophia Fuentes RN

## 2021-09-29 DIAGNOSIS — F41.9 ANXIETY: ICD-10-CM

## 2021-09-29 NOTE — TELEPHONE ENCOUNTER
Routing refill request to provider for review/approval because:  Drug not on the [facility] refill protocol     Daniel Blount RN

## 2021-09-29 NOTE — TELEPHONE ENCOUNTER
Pending Prescriptions:                       Disp   Refills    ALPRAZolam (XANAX) 0.5 MG tablet          20 tab*0            Sig: Take 1 tablet (0.5 mg) by mouth nightly as needed           for anxiety      [id number] - [place] - [address] AT [facility] [address]  [address] [address]  Phone: [phone] Fax: [phone]

## 2021-09-30 RX ORDER — ALPRAZOLAM 0.5 MG
0.5 TABLET ORAL
Qty: 20 TABLET | Refills: 0 | Status: SHIPPED | OUTPATIENT
Start: 2021-09-30 | End: 2021-12-22

## 2021-10-21 DIAGNOSIS — I10 BENIGN ESSENTIAL HYPERTENSION: ICD-10-CM

## 2021-10-22 DIAGNOSIS — R52 PAIN, UNSPECIFIED: ICD-10-CM

## 2021-10-22 RX ORDER — HYDROXYZINE HYDROCHLORIDE 25 MG/1
TABLET, FILM COATED ORAL
Qty: 240 TABLET | Refills: 5 | Status: SHIPPED | OUTPATIENT
Start: 2021-10-22 | End: 2021-12-22

## 2021-10-22 RX ORDER — METOPROLOL SUCCINATE 25 MG/1
TABLET, EXTENDED RELEASE ORAL
Qty: 90 TABLET | Refills: 1 | Status: SHIPPED | OUTPATIENT
Start: 2021-10-22 | End: 2022-01-20

## 2021-10-22 NOTE — TELEPHONE ENCOUNTER
Routing refill request to provider for review/approval because:  Medication is reported/historical    Danile Blount RN

## 2021-12-02 ENCOUNTER — E-VISIT (OUTPATIENT)
Dept: FAMILY MEDICINE | Facility: CLINIC | Age: 51
End: 2021-12-02
Payer: COMMERCIAL

## 2021-12-02 ENCOUNTER — MYC MEDICAL ADVICE (OUTPATIENT)
Dept: FAMILY MEDICINE | Facility: CLINIC | Age: 51
End: 2021-12-02
Payer: COMMERCIAL

## 2021-12-02 DIAGNOSIS — R05.9 COUGH: Primary | ICD-10-CM

## 2021-12-02 PROCEDURE — 99207 PR NON-BILLABLE SERV PER CHARTING: CPT | Performed by: FAMILY MEDICINE

## 2021-12-02 NOTE — TELEPHONE ENCOUNTER
Call placed to patient regarding mychart message  No answer; generic voicemail left requesting call back to Clinic RN at [phone]    Daniel Blount RN

## 2021-12-22 DIAGNOSIS — R52 PAIN, UNSPECIFIED: ICD-10-CM

## 2021-12-22 DIAGNOSIS — F41.9 ANXIETY: ICD-10-CM

## 2021-12-22 RX ORDER — HYDROXYZINE HYDROCHLORIDE 25 MG/1
TABLET, FILM COATED ORAL
Qty: 240 TABLET | Refills: 5 | Status: SHIPPED | OUTPATIENT
Start: 2021-12-22 | End: 2024-07-10

## 2021-12-22 RX ORDER — ALPRAZOLAM 0.5 MG
0.5 TABLET ORAL
Qty: 20 TABLET | Refills: 0 | Status: SHIPPED | OUTPATIENT
Start: 2021-12-22 | End: 2022-01-31

## 2021-12-22 NOTE — TELEPHONE ENCOUNTER
Routing refill request to provider for review/approval because:  Drug not on the [facility] refill protocol   PHQ9: 22 on 7/2021    Daniel Blount RN

## 2022-01-12 DIAGNOSIS — R05.9 COUGH: ICD-10-CM

## 2022-01-14 RX ORDER — ALBUTEROL SULFATE 90 UG/1
AEROSOL, METERED RESPIRATORY (INHALATION)
Qty: 18 G | Refills: 4 | Status: SHIPPED | OUTPATIENT
Start: 2022-01-14

## 2022-01-14 RX ORDER — BECLOMETHASONE DIPROPIONATE HFA 40 UG/1
AEROSOL, METERED RESPIRATORY (INHALATION)
Qty: 11 G | Refills: 4 | Status: SHIPPED | OUTPATIENT
Start: 2022-01-14 | End: 2022-01-31

## 2022-01-14 NOTE — TELEPHONE ENCOUNTER
Routing refill request to provider for review/approval because:  ACT > 6 months    Daniel Blount, RN

## 2022-01-18 ENCOUNTER — TELEPHONE (OUTPATIENT)
Dept: FAMILY MEDICINE | Facility: CLINIC | Age: 52
End: 2022-01-18
Payer: COMMERCIAL

## 2022-01-18 DIAGNOSIS — J45.30 MILD PERSISTENT ASTHMA WITHOUT COMPLICATION: Primary | ICD-10-CM

## 2022-01-18 NOTE — TELEPHONE ENCOUNTER
Prior Authorization Retail Medication Request    Medication/Dose:   beclomethasone HFA (QVAR REDIHALER) 40 MCG/ACT inhaler    Patient ID [id number]  Plan # [phone]

## 2022-01-20 ENCOUNTER — VIRTUAL VISIT (OUTPATIENT)
Dept: FAMILY MEDICINE | Facility: CLINIC | Age: 52
End: 2022-01-20
Payer: COMMERCIAL

## 2022-01-20 DIAGNOSIS — L40.50 PSORIATIC ARTHRITIS (H): ICD-10-CM

## 2022-01-20 DIAGNOSIS — E66.01 SEVERE OBESITY (BMI 35.0-39.9) WITH COMORBIDITY (H): ICD-10-CM

## 2022-01-20 DIAGNOSIS — L40.9 PSORIASIS: Primary | ICD-10-CM

## 2022-01-20 DIAGNOSIS — L03.221 CELLULITIS OF NECK: ICD-10-CM

## 2022-01-20 DIAGNOSIS — F33.0 MILD EPISODE OF RECURRENT MAJOR DEPRESSIVE DISORDER (H): ICD-10-CM

## 2022-01-20 PROCEDURE — 99214 OFFICE O/P EST MOD 30 MIN: CPT | Mod: TEL | Performed by: PHYSICIAN ASSISTANT

## 2022-01-20 RX ORDER — CLINDAMYCIN PHOSPHATE 10 UG/ML
LOTION TOPICAL 2 TIMES DAILY
Qty: 60 ML | Refills: 1 | Status: SHIPPED | OUTPATIENT
Start: 2022-01-20 | End: 2022-04-22

## 2022-01-20 RX ORDER — SULFAMETHOXAZOLE/TRIMETHOPRIM 800-160 MG
1 TABLET ORAL 2 TIMES DAILY
Qty: 14 TABLET | Refills: 0 | Status: SHIPPED | OUTPATIENT
Start: 2022-01-20 | End: 2022-01-27

## 2022-01-20 RX ORDER — FLUOCINONIDE 0.5 MG/G
OINTMENT TOPICAL 2 TIMES DAILY
Qty: 30 G | Refills: 1 | Status: SHIPPED | OUTPATIENT
Start: 2022-01-20 | End: 2022-04-22

## 2022-01-20 NOTE — TELEPHONE ENCOUNTER
PRIOR AUTHORIZATION DENIED    Medication: beclomethasone HFA (QVAR REDIHALER) 40 MCG/ACT inhaler    Denial Date: 1/20/2022    Denial Rational:  Patient must have a history of trial & failure to the formulary alternative(s) or have a contraindication or intolerance to the formulary alternatives:                Appeal Information:    If you would like to appeal, please supply P/A team with a letter of medical necessity with clinical reason.

## 2022-01-20 NOTE — PROGRESS NOTES
"Sonya is a 51 year old who is being evaluated via a billable telephone visit.      What phone number would you like to be contacted at? [phone]   How would you like to obtain your AVS? MyChart    Assessment & Plan     (L40.9) Psoriasis  (primary encounter diagnosis)  Comment: script for topical steroid. Also can try topical antibiotic for possible secondarily imposed bacterial infection. Does not sound like it is a cellulitis right now so hold off on oral antibiotics (did send in script as per below so if it gets worse over the weekend she can have it to start)  Plan: fluocinonide (LIDEX) 0.05 % external ointment,         clindamycin (CLEOCIN T) 1 % external lotion            (L03.221) Cellulitis of neck  Comment: see comment above  Plan: sulfamethoxazole-trimethoprim (BACTRIM DS)         800-160 MG tablet            (L40.50) Psoriatic arthritis (H)  Comment:   Plan: followed by rheumataology    (F33.0) Mild episode of recurrent major depressive disorder (H)  Comment:   Plan: stable    (E66.01) Severe obesity (BMI 35.0-39.9) with comorbidity (H)  Comment:   Plan: patient following diet/exercise regimen               BMI:   Estimated body mass index is 36.86 kg/m  as calculated from the following:    Height as of 8/6/21: 1.676 m (5' 6\").    Weight as of 8/6/21: 103.6 kg (228 lb 6.4 oz).           Return in about 1 week (around 1/27/2022) for If not improving or worsening.    [facility]    Zina Hassan is a 51 year old who presents for the following health issues     HPI     Rash  Onset/Duration: 3 weeks   Description  Location: Base of her hairline on the back of her neck   Character: raised, burning, draining, red  Itching: moderate  Intensity:  severe  Progression of Symptoms:  worsening  Accompanying signs and symptoms:   Fever: no  Body aches or joint pain: no  Sore throat symptoms: no  Recent cold symptoms: no  History:           Previous episodes of similar " rash: Yes  New exposures:  None  Recent travel: no  Exposure to similar rash: no  Precipitating or alleviating factors: None   Therapies tried and outcome: Nizoral shampoo       Has psoriatic arthritis  She will get spots and they will go away  Has this spot on the nape of her neck that isn't going away  Tries not to itchy but now is tender to touch, red and now weepy  Bought gloves to wear at night in case she is itching it and not realizing it  Has tried the ketoconazole shampoo and some home remedies but it's not going away  Entire area the size of a leighann     No surrounding redness or warmth to touch  She denies fevers  Feeling well otherwise just frustrated that this won't go away           Review of Systems   Remainder of ROS obtained and found to be negative other than that which was documented above        Objective           Vitals:  No vitals were obtained today due to virtual visit.    Physical Exam   healthy, alert and no distress  PSYCH: Alert and oriented times 3; coherent speech, normal   rate and volume, able to articulate logical thoughts, able   to abstract reason, no tangential thoughts, no hallucinations   or delusions  Her affect is normal  RESP: No cough, no audible wheezing, able to talk in full sentences  Remainder of exam unable to be completed due to telephone visits            Phone call duration: 12 minutes

## 2022-01-20 NOTE — TELEPHONE ENCOUNTER
Central Prior Authorization Team   Phone: [phone]    PA Initiation    Medication: beclomethasone HFA (QVAR REDIHALER) 40 MCG/ACT inhaler  Insurance Company: [employer] ([facility]) - Phone [phone] Fax [phone]  Pharmacy Filling the Rx: [id number] - [place], [place] - [address] AT [facility] [address], located within [facility] [address]  Filling Pharmacy Phone: [phone]  Filling Pharmacy Fax:    Start Date: 1/20/2022

## 2022-01-21 ENCOUNTER — TELEPHONE (OUTPATIENT)
Dept: FAMILY MEDICINE | Facility: CLINIC | Age: 52
End: 2022-01-21
Payer: COMMERCIAL

## 2022-01-21 NOTE — TELEPHONE ENCOUNTER
beclomethasone HFA (QVAR REDIHALER) 40 MCG/ACT inhaler 11 g 4 1/14/2022     Plan does not cover medication. Please call plan at [phone] to initiate PA or call/fax pharmacy to change medication. Patient ID # is [id number].

## 2022-01-24 NOTE — TELEPHONE ENCOUNTER
Can you please let patient know that her Qvar inhaler was denied by insurance and see if she would be OK if I sent a different one (Flovent) to the pharmacy?    Dr Mejía

## 2022-01-25 NOTE — TELEPHONE ENCOUNTER
"Call placed to patient.  Relayed message per Dr Mejía.  Yes, patient would like flovent sent to her pharmacy \"as long as it is once in the morning dosing like Qvar\".  Will forward to Dr Mejía.  Sophia Fuentes RN     "

## 2022-01-26 NOTE — TELEPHONE ENCOUNTER
PA already denied- see encounter from 1/18/22. If provider would like to appeal this decision please use that encounter.

## 2022-01-31 ENCOUNTER — VIRTUAL VISIT (OUTPATIENT)
Dept: FAMILY MEDICINE | Facility: CLINIC | Age: 52
End: 2022-01-31
Payer: COMMERCIAL

## 2022-01-31 DIAGNOSIS — F41.9 ANXIETY: ICD-10-CM

## 2022-01-31 PROCEDURE — 99213 OFFICE O/P EST LOW 20 MIN: CPT | Mod: GT | Performed by: FAMILY MEDICINE

## 2022-01-31 RX ORDER — ALPRAZOLAM 0.5 MG
0.5 TABLET ORAL
Qty: 20 TABLET | Refills: 0 | Status: SHIPPED | OUTPATIENT
Start: 2022-01-31 | End: 2022-05-27

## 2022-01-31 NOTE — PROGRESS NOTES
Sonya is a 51 year old who is being evaluated via a billable video visit.      How would you like to obtain your AVS? MyChart  If the video visit is dropped, the invitation should be resent by: Text to cell phone: #  Will anyone else be joining your video visit? No      Video Start Time: 6:53 AM    -------------------------    Assessment/Plan:    Sonya Mosqueda is a 51 year old female presenting for:    Anxiety  Doing well with Prozac.  Would like a refill of Xanax.  This was refilled about a month ago however she is going to [place] and would like another refill.  She states she still has over half of her previous prescription left wants to ensure she has enough in case she needs to take it a bit more often given that she sometimes get anxious with travel.  Refill of Xanax sent.  - ALPRAZolam (XANAX) 0.5 MG tablet  Dispense: 20 tablet; Refill: 0        Medications Discontinued During This Encounter   Medication Reason     beclomethasone HFA (QVAR REDIHALER) 40 MCG/ACT inhaler      ALPRAZolam (XANAX) 0.5 MG tablet Reorder           Chief Complaint:  No chief complaint on file.          Subjective:   Sonya Mosqueda is a pleasant 51 year old female being evaluated via video visit today for the following concern/s:     Anxiety: Patient is overall doing well.  Recently she was transitioned to Prozac.  She is doing well with the medication.  She notes that it is helping significantly with her anxiety.  She has a prescription for Xanax as well.  She got 20 tablets about a month ago.  She has maybe used 3 or 4 of them but would like a refill because she is going to [place].  She will be driving to [place] and staying with her family there.  She plans on being very safe with the Covid protocol.    She is fully vaccinated.      12 point review of systems completed and negative except for what has been described above    History   Smoking Status     Former Smoker     Packs/day: 1.00     Years: 20.00     Types: Cigarettes    Smokeless Tobacco     Never Used     Comment: quit 2006         Current Outpatient Medications:      ALPRAZolam (XANAX) 0.5 MG tablet, Take 1 tablet (0.5 mg) by mouth nightly as needed for anxiety, Disp: 20 tablet, Rfl: 0     albuterol (VENTOLIN HFA) 108 (90 Base) MCG/ACT inhaler, INHALE ONE OR TWO PUFFS BY MOUTH EVERY FOUR HOURS AS NEEDED FOR WHEEZING, Disp: 18 g, Rfl: 4     Biotin 5 MG TBDP, Take 1 tablet by mouth, Disp: , Rfl:      clindamycin (CLEOCIN T) 1 % external lotion, Apply topically 2 times daily, Disp: 60 mL, Rfl: 1     estradiol (ESTRACE) 0.5 MG tablet, Take 1 tablet (0.5 mg) by mouth daily, Disp: 90 tablet, Rfl: 3     Fiber, Corn Dextrin, POWD, Take 1 tablet by mouth, Disp: , Rfl:      fluocinonide (LIDEX) 0.05 % external ointment, Apply topically 2 times daily, Disp: 30 g, Rfl: 1     FLUoxetine (PROZAC) 20 MG capsule, Take 1 capsule (20 mg) by mouth daily, Disp: 90 capsule, Rfl: 2     fluticasone (ARNUITY ELLIPTA) 100 MCG/ACT inhaler, Inhale 1 puff into the lungs daily, Disp: 30 each, Rfl: 3     HYDROcodone-acetaminophen (NORCO)  MG per tablet, , Disp: , Rfl:      hydrOXYzine (ATARAX) 25 MG tablet, TAKE 1-2 TABLETS BY MOUTH EVERY 6 HOURS AS NEEDED, Disp: 240 tablet, Rfl: 5     IBUPROFEN 200 200 MG OR TABS, 1 TABLET EVERY 4 TO 6 HOURS AS NEEDED, Disp: , Rfl:      ketoconazole (NIZORAL) 2 % external shampoo, LATHER ON SCALP, LEAVE ON FOR 5 MINUTES AND RINSE, Disp: 120 mL, Rfl: 1     magnesium 200 MG TABS, Take 1 tablet by mouth, Disp: , Rfl:      montelukast (SINGULAIR) 10 MG tablet, Take 1 tablet (10 mg) by mouth At Bedtime, Disp: 30 tablet, Rfl: 0     multivitamin w/minerals (MULTI-VITAMIN) tablet, Take 1 tablet by mouth, Disp: , Rfl:      OMEGA-3 FATTY ACIDS-VITAMIN E PO, Take 1 capsule by mouth, Disp: , Rfl:      omeprazole (PRILOSEC) 20 MG DR capsule, TAKE 1 CAPSULE BY MOUTH 2  TIMES A DAY BEFORE MEALS., Disp: 180 capsule, Rfl: 3     pravastatin (PRAVACHOL) 20 MG tablet, Take 1 tablet (20  "mg) by mouth daily, Disp: 90 tablet, Rfl: 3     ZYRTEC 10 MG OR TABS, 1 TABLET DAILY, Disp: , Rfl:         Objective:  No vitals were done due to the nature of this visit  Vitals - Patient Reported 7/3/2020   Height (Patient Reported) 5' 6.5\"   Weight (Patient Reported) 212 lb 8 oz   BMI (Based on Pt Reported Ht/Wt) 33.78 kg/m2               General: No acute distress  Psych: Appropriate affect  HEENT: moist mucous membranes  Pulmonary: Breathing comfortably, speaking in complete sentences  Extremities: warm and well perfused with no edema  Skin: warm and dry with no rash         This note has been dictated and transcribed using voice recognition software.   Any errors in transcription are unintentional and inherent to the software.       Video-Visit Details    Type of service:  Video Visit    Video End Time:7:05 AM    Originating Location (pt. Location): Home    Distant Location (provider location):  [facility]     Platform used for Video Visit: Allan"

## 2022-02-17 PROBLEM — M17.10 UNILATERAL PRIMARY OSTEOARTHRITIS, UNSPECIFIED KNEE: Status: ACTIVE | Noted: 2021-07-21

## 2022-03-18 DIAGNOSIS — J30.2 SEASONAL ALLERGIC RHINITIS, UNSPECIFIED TRIGGER: ICD-10-CM

## 2022-03-18 DIAGNOSIS — F41.9 ANXIETY: ICD-10-CM

## 2022-03-18 RX ORDER — MONTELUKAST SODIUM 10 MG/1
TABLET ORAL
Qty: 90 TABLET | Refills: 1 | Status: SHIPPED | OUTPATIENT
Start: 2022-03-18 | End: 2022-09-13

## 2022-03-18 NOTE — TELEPHONE ENCOUNTER
Routing refill request to provider for review/approval because:  PHQ9: 22 on 7/2021  ACT > 6 months    Daniel Blount RN

## 2022-04-20 PROBLEM — Z87.891 PERSONAL HISTORY OF TOBACCO USE, PRESENTING HAZARDS TO HEALTH: Status: RESOLVED | Noted: 2021-07-21 | Resolved: 2022-04-20

## 2022-04-22 ENCOUNTER — OFFICE VISIT (OUTPATIENT)
Dept: FAMILY MEDICINE | Facility: CLINIC | Age: 52
End: 2022-04-22
Payer: COMMERCIAL

## 2022-04-22 VITALS
WEIGHT: 226.31 LBS | HEART RATE: 77 BPM | DIASTOLIC BLOOD PRESSURE: 82 MMHG | HEIGHT: 65 IN | TEMPERATURE: 98.4 F | OXYGEN SATURATION: 99 % | BODY MASS INDEX: 37.7 KG/M2 | SYSTOLIC BLOOD PRESSURE: 142 MMHG | RESPIRATION RATE: 16 BRPM

## 2022-04-22 DIAGNOSIS — R19.7 DIARRHEA, UNSPECIFIED TYPE: ICD-10-CM

## 2022-04-22 DIAGNOSIS — Z12.11 COLON CANCER SCREENING: ICD-10-CM

## 2022-04-22 DIAGNOSIS — R10.84 ABDOMINAL PAIN, GENERALIZED: ICD-10-CM

## 2022-04-22 DIAGNOSIS — F41.9 ANXIETY: Primary | ICD-10-CM

## 2022-04-22 PROBLEM — G54.0 THORACIC OUTLET SYNDROME: Status: RESOLVED | Noted: 2021-07-21 | Resolved: 2022-04-22

## 2022-04-22 LAB
ALBUMIN SERPL-MCNC: 3.7 G/DL (ref 3.4–5)
ALP SERPL-CCNC: 60 U/L (ref 40–150)
ALT SERPL W P-5'-P-CCNC: 33 U/L (ref 0–50)
ANION GAP SERPL CALCULATED.3IONS-SCNC: 5 MMOL/L (ref 3–14)
AST SERPL W P-5'-P-CCNC: 26 U/L (ref 0–45)
BILIRUB SERPL-MCNC: 0.7 MG/DL (ref 0.2–1.3)
BUN SERPL-MCNC: 13 MG/DL (ref 7–30)
CALCIUM SERPL-MCNC: 9.1 MG/DL (ref 8.5–10.1)
CHLORIDE BLD-SCNC: 103 MMOL/L (ref 94–109)
CO2 SERPL-SCNC: 30 MMOL/L (ref 20–32)
CREAT SERPL-MCNC: 0.6 MG/DL (ref 0.52–1.04)
ERYTHROCYTE [DISTWIDTH] IN BLOOD BY AUTOMATED COUNT: 13.3 % (ref 10–15)
GFR SERPL CREATININE-BSD FRML MDRD: >90 ML/MIN/1.73M2
GLUCOSE BLD-MCNC: 95 MG/DL (ref 70–99)
HCT VFR BLD AUTO: 38.3 % (ref 35–47)
HGB BLD-MCNC: 12.6 G/DL (ref 11.7–15.7)
LIPASE SERPL-CCNC: 147 U/L (ref 73–393)
MCH RBC QN AUTO: 27 PG (ref 26.5–33)
MCHC RBC AUTO-ENTMCNC: 32.9 G/DL (ref 31.5–36.5)
MCV RBC AUTO: 82 FL (ref 78–100)
PLATELET # BLD AUTO: 265 10E3/UL (ref 150–450)
POTASSIUM BLD-SCNC: 3.3 MMOL/L (ref 3.4–5.3)
PROT SERPL-MCNC: 7.5 G/DL (ref 6.8–8.8)
RBC # BLD AUTO: 4.66 10E6/UL (ref 3.8–5.2)
SODIUM SERPL-SCNC: 138 MMOL/L (ref 133–144)
WBC # BLD AUTO: 6.3 10E3/UL (ref 4–11)

## 2022-04-22 PROCEDURE — 85027 COMPLETE CBC AUTOMATED: CPT | Performed by: FAMILY MEDICINE

## 2022-04-22 PROCEDURE — 80053 COMPREHEN METABOLIC PANEL: CPT | Performed by: FAMILY MEDICINE

## 2022-04-22 PROCEDURE — 99214 OFFICE O/P EST MOD 30 MIN: CPT | Performed by: FAMILY MEDICINE

## 2022-04-22 PROCEDURE — 83690 ASSAY OF LIPASE: CPT | Performed by: FAMILY MEDICINE

## 2022-04-22 PROCEDURE — 36415 COLL VENOUS BLD VENIPUNCTURE: CPT | Performed by: FAMILY MEDICINE

## 2022-04-22 RX ORDER — FLUOXETINE 40 MG/1
40 CAPSULE ORAL DAILY
Qty: 90 CAPSULE | Refills: 1 | Status: SHIPPED | OUTPATIENT
Start: 2022-04-22 | End: 2022-05-26

## 2022-04-22 RX ORDER — METOPROLOL SUCCINATE 50 MG/1
50 TABLET, EXTENDED RELEASE ORAL DAILY
Qty: 90 TABLET | Refills: 1 | Status: SHIPPED | OUTPATIENT
Start: 2022-04-22 | End: 2022-05-27

## 2022-04-22 RX ORDER — FLUTICASONE FUROATE 100 UG/1
POWDER RESPIRATORY (INHALATION)
COMMUNITY
End: 2024-07-01

## 2022-04-22 ASSESSMENT — ASTHMA QUESTIONNAIRES
QUESTION_1 LAST FOUR WEEKS HOW MUCH OF THE TIME DID YOUR ASTHMA KEEP YOU FROM GETTING AS MUCH DONE AT WORK, SCHOOL OR AT HOME: NONE OF THE TIME
QUESTION_5 LAST FOUR WEEKS HOW WOULD YOU RATE YOUR ASTHMA CONTROL: SOMEWHAT CONTROLLED
QUESTION_3 LAST FOUR WEEKS HOW OFTEN DID YOUR ASTHMA SYMPTOMS (WHEEZING, COUGHING, SHORTNESS OF BREATH, CHEST TIGHTNESS OR PAIN) WAKE YOU UP AT NIGHT OR EARLIER THAN USUAL IN THE MORNING: TWO OR THREE NIGHTS A WEEK
ACT_TOTALSCORE: 18
QUESTION_2 LAST FOUR WEEKS HOW OFTEN HAVE YOU HAD SHORTNESS OF BREATH: THREE TO SIX TIMES A WEEK
QUESTION_4 LAST FOUR WEEKS HOW OFTEN HAVE YOU USED YOUR RESCUE INHALER OR NEBULIZER MEDICATION (SUCH AS ALBUTEROL): NOT AT ALL
ACT_TOTALSCORE: 18

## 2022-04-22 ASSESSMENT — PATIENT HEALTH QUESTIONNAIRE - PHQ9: SUM OF ALL RESPONSES TO PHQ QUESTIONS 1-9: 8

## 2022-04-22 NOTE — PROGRESS NOTES
Answers for HPI/ROS submitted by the patient on 4/21/2022  Do you check your blood pressure regularly outside of the clinic?: Yes  Are your blood pressures ever more than 140 on the top number (systolic) OR more than 90 on the bottom number (diastolic)? (For example, greater than 140/90): Yes  Are you following a low salt diet?: No  How many servings of fruits and vegetables do you eat daily?: 0-1  On average, how many sweetened beverages do you drink each day (Examples: soda, juice, sweet tea, etc.  Do NOT count diet or artificially sweetened beverages)?: 0  How many minutes a day do you exercise enough to make your heart beat faster?: 20 to 29  How many days a week do you exercise enough to make your heart beat faster?: 3 or less  How many days per week do you miss taking your medication?: 0  What is the reason for your visit today?: Abdominal pain frequency of BM and elevated BP. 140/ 85 to  150/90  When did your symptoms begin?: 3-4 weeks ago  What are your symptoms?: Abdominal pain  How would you describe these symptoms?: Moderate  Are your symptoms:: Staying the same  Have you had these symptoms before?: No  Is there anything that makes you feel worse?: No  Is there anything that makes you feel better?: No    Assessment/Plan:    Sonya Mosqueda is a 51 year old female presenting for:    Anxiety  Her anxiety is certainly flared at this point.  She will contact her insurance to see where she can go for therapy.  She has a few places in mind.  She will let me know if she needs referral.  Otherwise, Prozac increased to 40 mg daily.  Increase metoprolol to 50 mg daily as I believe this will likely help her blood pressure as well.    She can try taking her Xanax a little bit more freely over the next few weeks to see if this is helpful as well.    Discussed signs and symptoms that would require further evaluation.  - FLUoxetine (PROZAC) 40 MG capsule  Dispense: 90 capsule; Refill: 1  - metoprolol succinate ER  (TOPROL-XL) 50 MG 24 hr tablet  Dispense: 90 tablet; Refill: 1    Abdominal pain, generalized  Laboratory testing done as below.  Patient has decreased alcohol consumption.  If liver enzymes are a bit elevated would recommend that she cut out alcohol entirely.  Certainly this could be the cause of her diarrhea as well.  - Comprehensive metabolic panel (BMP + Alb, Alk Phos, ALT, AST, Total. Bili, TP)  - CBC with platelets  - Lipase  - Lipase  - CBC with platelets  - Comprehensive metabolic panel (BMP + Alb, Alk Phos, ALT, AST, Total. Bili, TP)    Diarrhea, unspecified type  Discussed cutting back on alcohol.  Discussed getting anxiety under better control.  She is taking a probiotic as well as fiber.    Colon cancer screening  Referral for colonoscopy placed.  - Adult Gastro Ref - Procedure Only      Medications Discontinued During This Encounter   Medication Reason     Biotin 5 MG TBDP      clindamycin (CLEOCIN T) 1 % external lotion      fluocinonide (LIDEX) 0.05 % external ointment            Chief Complaint:  Abdominal Pain, Blood Pressure Check, Recheck Medication, and Tinnitus        Subjective:   Sonya Mosqueda is a pleasant 51-year-old female presenting to the clinic today for multitude of concerns.    Anxiety: Patient has a past medical history significant for anxiety.  She is currently taking Prozac.  This was very stable.  She states that her and her  have decided to move to [place].  She is very distraught about the decision and this has been causing her anxiety to flare.  Because of her anxiety flare she has been drinking a bit more.  She was drinking about 3 alcoholic beverages a night getting together with some friends.  She started to cut back on that a bit.  She notes that she is feeling anxious most of the time.  Intermittent panic attacks.  She has some lifestyle modifications she can make which do help but she is wondering if there is anything else that she can do.    She notes that when she  does get anxious she will get pains in her chest both on the left and right side which are migratory.  No shortness of breath.    Diarrhea: Patient notes that for the last few months she has been noticing loose stools particularly in the morning.  She notes that her stools have been a bit pale as well.  No blood.  She does state that she has been drinking a bit more and worries about her liver enzymes.  She is cutting back on drinking.  She was drinking 3 drinks a night but now is just drinking 1 and night and plans on stopping completely this weekend.    12 point review of systems completed and negative except for what has been described above    History   Smoking Status     Former Smoker     Packs/day: 1.00     Years: 20.00     Types: Cigarettes   Smokeless Tobacco     Never Used     Comment: quit 2006         Current Outpatient Medications:      albuterol (VENTOLIN HFA) 108 (90 Base) MCG/ACT inhaler, INHALE ONE OR TWO PUFFS BY MOUTH EVERY FOUR HOURS AS NEEDED FOR WHEEZING, Disp: 18 g, Rfl: 4     ALPRAZolam (XANAX) 0.5 MG tablet, Take 1 tablet (0.5 mg) by mouth nightly as needed for anxiety, Disp: 20 tablet, Rfl: 0     estradiol (ESTRACE) 0.5 MG tablet, Take 1 tablet (0.5 mg) by mouth daily, Disp: 90 tablet, Rfl: 3     Fiber, Corn Dextrin, POWD, Take 1 tablet by mouth, Disp: , Rfl:      FLUoxetine (PROZAC) 20 MG capsule, TAKE 1 CAPSULE BY MOUTH  DAILY, Disp: 90 capsule, Rfl: 1     FLUoxetine (PROZAC) 40 MG capsule, Take 1 capsule (40 mg) by mouth daily, Disp: 90 capsule, Rfl: 1     fluticasone (ARNUITY ELLIPTA) 100 MCG/ACT inhaler, Inhale 1 puff into the lungs daily, Disp: 30 each, Rfl: 3     HYDROcodone-acetaminophen (NORCO)  MG per tablet, , Disp: , Rfl:      hydrOXYzine (ATARAX) 25 MG tablet, TAKE 1-2 TABLETS BY MOUTH EVERY 6 HOURS AS NEEDED, Disp: 240 tablet, Rfl: 5     IBUPROFEN 200 200 MG OR TABS, 1 TABLET EVERY 4 TO 6 HOURS AS NEEDED, Disp: , Rfl:      ketoconazole (NIZORAL) 2 % external shampoo,  "LATHER ON SCALP, LEAVE ON FOR 5 MINUTES AND RINSE, Disp: 120 mL, Rfl: 1     magnesium 200 MG TABS, Take 1 tablet by mouth, Disp: , Rfl:      metoprolol succinate ER (TOPROL-XL) 50 MG 24 hr tablet, Take 1 tablet (50 mg) by mouth daily, Disp: 90 tablet, Rfl: 1     montelukast (SINGULAIR) 10 MG tablet, TAKE 1 TABLET BY MOUTH  DAILY, Disp: 90 tablet, Rfl: 1     multivitamin w/minerals (THERA-VIT-M) tablet, Take 1 tablet by mouth, Disp: , Rfl:      OMEGA-3 FATTY ACIDS-VITAMIN E PO, Take 1 capsule by mouth, Disp: , Rfl:      omeprazole (PRILOSEC) 20 MG DR capsule, TAKE 1 CAPSULE BY MOUTH 2  TIMES A DAY BEFORE MEALS., Disp: 180 capsule, Rfl: 3     pravastatin (PRAVACHOL) 20 MG tablet, Take 1 tablet (20 mg) by mouth daily, Disp: 90 tablet, Rfl: 3     ZYRTEC 10 MG OR TABS, 1 TABLET DAILY, Disp: , Rfl:      fluticasone (ARNUITY ELLIPTA) 100 MCG/ACT inhaler, Arnuity Ellipta 100 mcg/actuation powder for inhalation  INHALE 1 PUFF INTO THE LUNGS DAILY, Disp: , Rfl:       Objective:  Vitals:    04/22/22 0915 04/22/22 1157   BP: (!) 140/72 (!) 142/82   Pulse: 77    Resp: 16    Temp: 98.4  F (36.9  C)    TempSrc: Tympanic    SpO2: 99%    Weight: 102.7 kg (226 lb 5 oz)    Height: 1.651 m (5' 5\")        Body mass index is 37.66 kg/m .    Vital signs reviewed and stable  General: No acute distress  Psych: Appropriate affect  HEENT: moist mucous membranes, pupils equal, round, reactive to light and accomodation, tympanic membranes are pearly grey bilaterally  Lymph: no cervical or supraclavicular lymphadenopathy  Cardiovascular: regular rate and rhythm with no murmur  Pulmonary: clear to auscultation bilaterally with no wheeze  Abdomen: soft, non tender, non distended with normo-active bowel sounds  Extremities: warm and well perfused with no edema  Skin: warm and dry with no rash         This note has been dictated and transcribed using voice recognition software.   Any errors in transcription are unintentional and inherent to the " software.

## 2022-05-25 DIAGNOSIS — F41.9 ANXIETY: ICD-10-CM

## 2022-05-25 DIAGNOSIS — J45.30 MILD PERSISTENT ASTHMA WITHOUT COMPLICATION: ICD-10-CM

## 2022-05-26 RX ORDER — FLUOXETINE 40 MG/1
40 CAPSULE ORAL DAILY
Qty: 90 CAPSULE | Refills: 1 | Status: SHIPPED | OUTPATIENT
Start: 2022-05-26 | End: 2022-11-01

## 2022-05-27 ENCOUNTER — VIRTUAL VISIT (OUTPATIENT)
Dept: FAMILY MEDICINE | Facility: CLINIC | Age: 52
End: 2022-05-27
Payer: COMMERCIAL

## 2022-05-27 DIAGNOSIS — Z00.00 HEALTHCARE MAINTENANCE: Primary | ICD-10-CM

## 2022-05-27 DIAGNOSIS — F41.9 ANXIETY: ICD-10-CM

## 2022-05-27 PROCEDURE — 99213 OFFICE O/P EST LOW 20 MIN: CPT | Mod: GT | Performed by: FAMILY MEDICINE

## 2022-05-27 RX ORDER — METOPROLOL SUCCINATE 50 MG/1
50 TABLET, EXTENDED RELEASE ORAL DAILY
Qty: 90 TABLET | Refills: 1 | Status: SHIPPED | OUTPATIENT
Start: 2022-05-27 | End: 2022-11-01

## 2022-05-27 RX ORDER — ALPRAZOLAM 0.5 MG
0.5 TABLET ORAL
Qty: 20 TABLET | Refills: 0 | Status: SHIPPED | OUTPATIENT
Start: 2022-05-27 | End: 2022-12-30

## 2022-05-27 NOTE — PROGRESS NOTES
Sonya is a 51 year old who is being evaluated via a billable video visit.      How would you like to obtain your AVS? MyChart  If the video visit is dropped, the invitation should be resent by: Text to cell phone: #  Will anyone else be joining your video visit? No      Video Start Time: 4:28 PM    -------------------------    Assessment/Plan:    Sonya Mosqueda is a 51 year old female presenting for:    Anxiety  Doing very well.  Refill Xanax sent to the pharmacy so she will have enough on hand after her move.  Metoprolol sent to the pharmacy as she is taking this for her blood pressure but also is helpful for her anxiety.  She does not need a refill of Prozac as I believe I sent that yesterday.  - ALPRAZolam (XANAX) 0.5 MG tablet  Dispense: 20 tablet; Refill: 0  - metoprolol succinate ER (TOPROL XL) 50 MG 24 hr tablet  Dispense: 90 tablet; Refill: 1    Healthcare maintenance  - Lipid panel reflex to direct LDL Fasting  - HEMOGLOBIN A1C  - COMPREHENSIVE METABOLIC PANEL  - CBC with Platelets        Medications Discontinued During This Encounter   Medication Reason     ALPRAZolam (XANAX) 0.5 MG tablet Reorder     metoprolol succinate ER (TOPROL-XL) 50 MG 24 hr tablet Reorder           Chief Complaint:  No chief complaint on file.          Subjective:   Sonya Mosqueda is a pleasant 51 year old female being evaluated via video visit today for the following concern/s:     Follow-up: I saw this patient about a month ago for increased anxiety.  This is largely related to a move to [place] which is upcoming.  She states that things have been improved.  I increased her Prozac to 60 mg daily at that time and she thinks that that is helped.  We also started some as needed Xanax which she has only used a few times but thinks just having it is beneficial.  She states that she is having less panic episodes.  She is talked with her  about the move and feels as though they are on the same page at this point.    She otherwise  feels well.  She has no specific questions or concerns otherwise.  She was planning on following up with me for a complete physical examination sometime in the next few months.  She is wondering about getting some laboratory testing prior to her visit and is hopeful to get that ordered today.      12 point review of systems completed and negative except for what has been described above    History   Smoking Status     Former Smoker     Packs/day: 1.00     Years: 20.00     Types: Cigarettes   Smokeless Tobacco     Never Used     Comment: quit 2006         Current Outpatient Medications:      ALPRAZolam (XANAX) 0.5 MG tablet, Take 1 tablet (0.5 mg) by mouth nightly as needed for anxiety, Disp: 20 tablet, Rfl: 0     metoprolol succinate ER (TOPROL XL) 50 MG 24 hr tablet, Take 1 tablet (50 mg) by mouth daily, Disp: 90 tablet, Rfl: 1     albuterol (VENTOLIN HFA) 108 (90 Base) MCG/ACT inhaler, INHALE ONE OR TWO PUFFS BY MOUTH EVERY FOUR HOURS AS NEEDED FOR WHEEZING, Disp: 18 g, Rfl: 4     estradiol (ESTRACE) 0.5 MG tablet, Take 1 tablet (0.5 mg) by mouth daily, Disp: 90 tablet, Rfl: 3     Fiber, Corn Dextrin, POWD, Take 1 tablet by mouth, Disp: , Rfl:      FLUoxetine (PROZAC) 20 MG capsule, Take 1 capsule (20 mg) by mouth daily, Disp: 90 capsule, Rfl: 0     FLUoxetine (PROZAC) 40 MG capsule, Take 1 capsule (40 mg) by mouth daily, Disp: 90 capsule, Rfl: 1     fluticasone (ARNUITY ELLIPTA) 100 MCG/ACT inhaler, Inhale 1 puff into the lungs daily, Disp: 30 each, Rfl: 3     fluticasone (ARNUITY ELLIPTA) 100 MCG/ACT inhaler, Arnuity Ellipta 100 mcg/actuation powder for inhalation  INHALE 1 PUFF INTO THE LUNGS DAILY, Disp: , Rfl:      HYDROcodone-acetaminophen (NORCO)  MG per tablet, , Disp: , Rfl:      hydrOXYzine (ATARAX) 25 MG tablet, TAKE 1-2 TABLETS BY MOUTH EVERY 6 HOURS AS NEEDED, Disp: 240 tablet, Rfl: 5     IBUPROFEN 200 200 MG OR TABS, 1 TABLET EVERY 4 TO 6 HOURS AS NEEDED, Disp: , Rfl:      ketoconazole  "(NIZORAL) 2 % external shampoo, LATHER ON SCALP, LEAVE ON FOR 5 MINUTES AND RINSE, Disp: 120 mL, Rfl: 1     magnesium 200 MG TABS, Take 1 tablet by mouth, Disp: , Rfl:      montelukast (SINGULAIR) 10 MG tablet, TAKE 1 TABLET BY MOUTH  DAILY, Disp: 90 tablet, Rfl: 1     multivitamin w/minerals (THERA-VIT-M) tablet, Take 1 tablet by mouth, Disp: , Rfl:      OMEGA-3 FATTY ACIDS-VITAMIN E PO, Take 1 capsule by mouth, Disp: , Rfl:      omeprazole (PRILOSEC) 20 MG DR capsule, TAKE 1 CAPSULE BY MOUTH 2  TIMES A DAY BEFORE MEALS., Disp: 180 capsule, Rfl: 3     pravastatin (PRAVACHOL) 20 MG tablet, Take 1 tablet (20 mg) by mouth daily, Disp: 90 tablet, Rfl: 3     ZYRTEC 10 MG OR TABS, 1 TABLET DAILY, Disp: , Rfl:         Objective:  No vitals were done due to the nature of this visit  Vitals - Patient Reported 7/3/2020 5/27/2022   Height (Patient Reported) 5' 6.5\" -   Weight (Patient Reported) 212 lb 8 oz -   BMI (Based on Pt Reported Ht/Wt) 33.78 kg/m2 -   Systolic (Patient Reported) - 132   Diastolic (Patient Reported) - 84               General: No acute distress  Psych: Appropriate affect  HEENT: moist mucous membranes  Pulmonary: Breathing comfortably, speaking in complete sentences  Extremities: warm and well perfused with no edema  Skin: warm and dry with no rash       This note has been dictated and transcribed using voice recognition software.   Any errors in transcription are unintentional and inherent to the software.      Video-Visit Details    Type of service:  Video Visit    Video End Time:4:44 PM    Originating Location (pt. Location): Home    Distant Location (provider location):  [facility]     Platform used for Video Visit: Allan"

## 2022-06-01 ENCOUNTER — HOSPITAL ENCOUNTER (OUTPATIENT)
Facility: AMBULATORY SURGERY CENTER | Age: 52
End: 2022-06-01
Attending: SURGERY
Payer: COMMERCIAL

## 2022-06-02 ENCOUNTER — ANCILLARY PROCEDURE (OUTPATIENT)
Dept: MAMMOGRAPHY | Facility: HOSPITAL | Age: 52
End: 2022-06-02
Attending: FAMILY MEDICINE
Payer: COMMERCIAL

## 2022-06-02 DIAGNOSIS — Z12.31 VISIT FOR SCREENING MAMMOGRAM: ICD-10-CM

## 2022-06-02 PROCEDURE — 77067 SCR MAMMO BI INCL CAD: CPT

## 2022-06-21 DIAGNOSIS — E78.5 HYPERLIPIDEMIA LDL GOAL <130: ICD-10-CM

## 2022-06-21 RX ORDER — PRAVASTATIN SODIUM 20 MG
TABLET ORAL
Qty: 90 TABLET | Refills: 0 | Status: SHIPPED | OUTPATIENT
Start: 2022-06-21 | End: 2022-06-23

## 2022-06-23 DIAGNOSIS — E78.5 HYPERLIPIDEMIA LDL GOAL <130: ICD-10-CM

## 2022-06-23 RX ORDER — PRAVASTATIN SODIUM 20 MG
TABLET ORAL
Qty: 90 TABLET | Refills: 0 | Status: SHIPPED | OUTPATIENT
Start: 2022-06-23 | End: 2022-09-13

## 2022-06-23 NOTE — TELEPHONE ENCOUNTER
Prescription approved per [facility] Refill Protocol.  Patient has appointment scheduled 7/5/22  Sophia Fuentes RN

## 2022-06-30 VITALS — WEIGHT: 225 LBS | BODY MASS INDEX: 37.49 KG/M2 | HEIGHT: 65 IN

## 2022-07-05 RX ORDER — SODIUM CHLORIDE, SODIUM LACTATE, POTASSIUM CHLORIDE, CALCIUM CHLORIDE 600; 310; 30; 20 MG/100ML; MG/100ML; MG/100ML; MG/100ML
INJECTION, SOLUTION INTRAVENOUS CONTINUOUS
Status: CANCELLED | OUTPATIENT
Start: 2022-07-05

## 2022-07-05 RX ORDER — LIDOCAINE 40 MG/G
CREAM TOPICAL
Status: CANCELLED | OUTPATIENT
Start: 2022-07-05

## 2022-08-18 ENCOUNTER — TELEPHONE (OUTPATIENT)
Dept: FAMILY MEDICINE | Facility: CLINIC | Age: 52
End: 2022-08-18

## 2022-08-18 NOTE — TELEPHONE ENCOUNTER
Those numbers look good overall - please let her know that no change is needed at this point as long as she is feeling well!    thanks!    EB

## 2022-08-18 NOTE — TELEPHONE ENCOUNTER
Pt calling with home BP readings in F/U to 04-22-22 metoprolol increase to 50 mg daily:   8/16- 135/89  8/17- 138/72 AM           140/90 PM  8/18- 128/87    Forwarded to PCP for review, further F/U recommendations.  LEXIE Vicente RN

## 2022-08-18 NOTE — TELEPHONE ENCOUNTER
Call placed to patient   Relayed Dr. Mejía message    Patient verbalized understanding  No further questions/concerns    Daniel Blount RN

## 2022-08-22 DIAGNOSIS — J45.30 MILD PERSISTENT ASTHMA WITHOUT COMPLICATION: ICD-10-CM

## 2022-08-22 RX ORDER — FLUTICASONE FUROATE 100 UG/1
1 POWDER RESPIRATORY (INHALATION) DAILY
Qty: 90 EACH | Refills: 0 | Status: SHIPPED | OUTPATIENT
Start: 2022-08-22 | End: 2022-11-01

## 2022-08-22 NOTE — TELEPHONE ENCOUNTER
Routing refill request to provider for review/approval because:  ACT: 18 on 4/2022    Daniel Blount RN

## 2022-09-13 DIAGNOSIS — E78.5 HYPERLIPIDEMIA LDL GOAL <130: ICD-10-CM

## 2022-09-13 DIAGNOSIS — J30.2 SEASONAL ALLERGIC RHINITIS, UNSPECIFIED TRIGGER: ICD-10-CM

## 2022-09-13 RX ORDER — MONTELUKAST SODIUM 10 MG/1
TABLET ORAL
Qty: 90 TABLET | Refills: 2 | Status: SHIPPED | OUTPATIENT
Start: 2022-09-13

## 2022-09-13 RX ORDER — PRAVASTATIN SODIUM 20 MG
TABLET ORAL
Qty: 90 TABLET | Refills: 0 | Status: SHIPPED | OUTPATIENT
Start: 2022-09-13 | End: 2022-12-05

## 2022-09-13 NOTE — TELEPHONE ENCOUNTER
Medication is being filled for 1 time refill only due to:  Patient needs labs fasting.   Hermelindo Obregon RN

## 2022-09-13 NOTE — TELEPHONE ENCOUNTER
Prescription approved per [facility] Refill Protocol.  Hermelindo Obregon RN

## 2022-10-16 ENCOUNTER — HEALTH MAINTENANCE LETTER (OUTPATIENT)
Age: 52
End: 2022-10-16

## 2022-10-31 DIAGNOSIS — F41.9 ANXIETY: ICD-10-CM

## 2022-10-31 DIAGNOSIS — J45.30 MILD PERSISTENT ASTHMA WITHOUT COMPLICATION: ICD-10-CM

## 2022-11-01 RX ORDER — METOPROLOL SUCCINATE 50 MG/1
TABLET, EXTENDED RELEASE ORAL
Qty: 90 TABLET | Refills: 1 | Status: SHIPPED | OUTPATIENT
Start: 2022-11-01 | End: 2024-07-10

## 2022-11-01 RX ORDER — FLUOXETINE 40 MG/1
CAPSULE ORAL
Qty: 90 CAPSULE | Refills: 1 | Status: SHIPPED | OUTPATIENT
Start: 2022-11-01 | End: 2024-07-10

## 2022-11-01 RX ORDER — FLUTICASONE FUROATE 100 UG/1
POWDER RESPIRATORY (INHALATION)
Qty: 30 EACH | Refills: 3 | Status: SHIPPED | OUTPATIENT
Start: 2022-11-01 | End: 2023-01-13

## 2022-11-01 NOTE — TELEPHONE ENCOUNTER
"Routing refill request to provider for review/approval because:  Labs out of range:  BP/Asthma assessment, needs office visit      Fluticasone  Last Written Prescription Date: 8/22/22  Last Fill Quantity: 90, # refills: 0  Last office visit provider: 4/22/22    Metoprolol ER  Last Written Prescription Date: 5/27/22  Last Fill Quantity: 90, # refills: 1  Last office visit provider: 4/22/22    Requested Prescriptions   Pending Prescriptions Disp Refills     FLUoxetine (PROZAC) 40 MG capsule [Pharmacy Med Name: FLUOXETINE  40MG  CAP] 90 capsule 3     Sig: TAKE 1 CAPSULE BY MOUTH  DAILY       SSRIs Protocol Passed - 10/31/2022  9:11 AM        Passed - Recent (12 mo) or future (30 days) visit within the authorizing provider's specialty     Patient has had an office visit with the authorizing provider or a provider within the authorizing providers department within the previous 12 mos or has a future within next 30 days. See \"Patient Info\" tab in inbasket, or \"Choose Columns\" in Meds & Orders section of the refill encounter.              Passed - Medication is active on med list        Passed - Patient is age 18 or older        Passed - No active pregnancy on record        Passed - No positive pregnancy test in last 12 months           ARNUITY ELLIPTA 100 MCG/ACT inhaler [Pharmacy Med Name: ARNUITY  100MCG  INH  ELLIPTA]  3     Sig: INHALE 1 INHALATION BY  MOUTH INTO THE LUNGS DAILY       Inhaled Steroids Protocol Failed - 10/31/2022  9:11 AM        Failed - Asthma control assessment score within normal limits in last 6 months     Please review ACT score.           Passed - Patient is age 12 or older        Passed - Medication is active on med list        Passed - Recent (6 mo) or future (30 days) visit within the authorizing provider's specialty     Patient had office visit in the last 6 months or has a visit in the next 30 days with authorizing provider or within the authorizing provider's specialty.  See \"Patient Info\" " "tab in inbasket, or \"Choose Columns\" in Meds & Orders section of the refill encounter.               metoprolol succinate ER (TOPROL XL) 50 MG 24 hr tablet [Pharmacy Med Name: Metoprolol Succinate ER 50 MG Oral Tablet Extended Release 24 Hour] 90 tablet 3     Sig: TAKE 1 TABLET BY MOUTH  DAILY       Beta-Blockers Protocol Failed - 10/31/2022  9:11 AM        Failed - Blood pressure under 140/90 in past 12 months     BP Readings from Last 3 Encounters:   04/22/22 (!) 142/82   08/17/21 130/82   08/06/21 (!) 140/92                 Passed - Patient is age 6 or older        Passed - Recent (12 mo) or future (30 days) visit within the authorizing provider's specialty     Patient has had an office visit with the authorizing provider or a provider within the authorizing providers department within the previous 12 mos or has a future within next 30 days. See \"Patient Info\" tab in inbasket, or \"Choose Columns\" in Meds & Orders section of the refill encounter.              Passed - Medication is active on med maurice Martinez RN 11/01/22 1:54 PM  "

## 2022-11-28 ENCOUNTER — TRANSFERRED RECORDS (OUTPATIENT)
Dept: HEALTH INFORMATION MANAGEMENT | Facility: CLINIC | Age: 52
End: 2022-11-28

## 2022-12-07 ENCOUNTER — TRANSFERRED RECORDS (OUTPATIENT)
Dept: HEALTH INFORMATION MANAGEMENT | Facility: CLINIC | Age: 52
End: 2022-12-07

## 2022-12-29 DIAGNOSIS — F41.9 ANXIETY: ICD-10-CM

## 2022-12-29 NOTE — TELEPHONE ENCOUNTER
Routing refill request to provider for review/approval because:  Drug not on the [facility] refill protocol   Sophia Fuenets RN

## 2022-12-30 RX ORDER — ALPRAZOLAM 0.5 MG
0.5 TABLET ORAL
Qty: 20 TABLET | Refills: 0 | Status: SHIPPED | OUTPATIENT
Start: 2022-12-30 | End: 2023-02-02

## 2023-02-01 NOTE — TELEPHONE ENCOUNTER
Routing refill request to provider for review/approval because:  Drug not on the [facility] refill protocol     Pt is asking earlier than usual because she is getting ready to go out of town on 5/19/21.    She is not out yet.  Does not want to be out while out of town.    Astrid Winkler RN           Topical Ketoconazole Counseling: Patient counseled that this medication may cause skin irritation or allergic reactions.  In the event of skin irritation, the patient was advised to reduce the amount of the drug applied or use it less frequently.   The patient verbalized understanding of the proper use and possible adverse effects of ketoconazole.  All of the patient's questions and concerns were addressed.

## 2023-08-26 ENCOUNTER — HEALTH MAINTENANCE LETTER (OUTPATIENT)
Age: 53
End: 2023-08-26

## 2023-10-11 ENCOUNTER — TELEPHONE (OUTPATIENT)
Dept: FAMILY MEDICINE | Facility: CLINIC | Age: 53
End: 2023-10-11
Payer: COMMERCIAL

## 2023-10-11 NOTE — TELEPHONE ENCOUNTER
Order/Referral Request    Who is requesting: patient     Orders being requested: tdap (adacel)  she receive tenivac on 4/26/2019; however daughter is having baby and she wants mom immunization with adacel.  Because TD is current, she needs order for adacel /tdap.     Patient would like MA appt for 10/23 at       Does patient have a preference on a Group/Provider/Facility? [facility]    Does patient have an appointment scheduled?: No    Where to send orders: Place orders within Epic    Could we send this information to you in [facility] or would you prefer to receive a phone call?:   Patient would prefer a phone call   Okay to leave a detailed message?: Yes at Cell number on file:    Telephone Information:   Mobile [phone]

## 2023-10-25 ENCOUNTER — NURSE TRIAGE (OUTPATIENT)
Dept: FAMILY MEDICINE | Facility: CLINIC | Age: 53
End: 2023-10-25
Payer: COMMERCIAL

## 2023-10-25 NOTE — TELEPHONE ENCOUNTER
"Reason for Disposition   MILD TO MODERATE constant pain lasting > 2 hours    Protocols used: Abdominal Pain - Female-A-OH    Nurse Triage SBAR    Is this a 2nd Level Triage? YES, LICENSED PRACTITIONER REVIEW IS REQUIRED    Situation: Reporting LLQ abdominal pain that started a week ago and has persisted - worsened slightly from onset     Background: Pain started a week ago - no abdominal history     Assessment: LLQ abdominal pain   Rates the pain a 3-4/10 on the pain scale  Describes the pain as \"annoying\"  Pain is constant - was intermittent in the beginning but has become constant on day 4 of symptoms   Denies pain radiating  Reports pain increases with bending   Has tried a heating pad and Tylenol with minimal relief     Intermittent nausea  Denies vomiting  Has been eating a bland diet  Decreased appetite   Good fluid intake    Patient's bowels have been alternating from constipated to diarrhea   Denies blood in stools     Denies fevers  Denies urinary symptoms  Denies abdominal bloating or distention   Denies increased pain to abdomen with palpation   Patient passing gas - states she has been passing more gas than normal     Protocol Recommended Disposition:   Go To ED/UCC Now (Or To Office With PCP Approval)    Recommendation: No appointments in Clinic - advised patient go to UC - Patient going to the Urgency Room for evaluation     Routed to provider    Does the patient meet one of the following criteria for ADS visit consideration? No    Daniel Blount RN    "

## 2023-11-04 ENCOUNTER — HEALTH MAINTENANCE LETTER (OUTPATIENT)
Age: 53
End: 2023-11-04

## 2023-11-13 ENCOUNTER — ANCILLARY PROCEDURE (OUTPATIENT)
Dept: MAMMOGRAPHY | Facility: CLINIC | Age: 53
End: 2023-11-13
Payer: COMMERCIAL

## 2023-11-13 DIAGNOSIS — Z12.39 ENCOUNTER FOR OTHER SCREENING FOR MALIGNANT NEOPLASM OF BREAST: ICD-10-CM

## 2023-11-13 PROCEDURE — 77067 SCR MAMMO BI INCL CAD: CPT

## 2023-12-23 ENCOUNTER — TRANSFERRED RECORDS (OUTPATIENT)
Dept: MULTI SPECIALTY CLINIC | Facility: CLINIC | Age: 53
End: 2023-12-23

## 2024-05-31 ENCOUNTER — TRANSFERRED RECORDS (OUTPATIENT)
Dept: HEALTH INFORMATION MANAGEMENT | Facility: CLINIC | Age: 54
End: 2024-05-31

## 2024-06-17 PROCEDURE — 90715 TDAP VACCINE 7 YRS/> IM: CPT | Performed by: FAMILY MEDICINE

## 2024-06-17 PROCEDURE — 90471 IMMUNIZATION ADMIN: CPT | Performed by: FAMILY MEDICINE

## 2024-07-01 RX ORDER — ARIPIPRAZOLE 2 MG/1
1 TABLET ORAL AT BEDTIME
COMMUNITY
End: 2024-07-10

## 2024-07-01 RX ORDER — PHENTERMINE HYDROCHLORIDE 30 MG/1
CAPSULE ORAL
COMMUNITY

## 2024-07-01 RX ORDER — FLUOCINONIDE 0.5 MG/G
OINTMENT TOPICAL 2 TIMES DAILY
COMMUNITY
End: 2024-07-10

## 2024-07-01 RX ORDER — TOPIRAMATE 25 MG/1
25 TABLET, FILM COATED ORAL DAILY
COMMUNITY
End: 2024-08-30

## 2024-07-01 RX ORDER — CHOLECALCIFEROL (VITAMIN D3) 50 MCG
50 TABLET ORAL
COMMUNITY

## 2024-07-01 RX ORDER — CLINDAMYCIN PHOSPHATE 10 UG/ML
LOTION TOPICAL 2 TIMES DAILY
COMMUNITY
End: 2024-07-10

## 2024-07-09 SDOH — HEALTH STABILITY: PHYSICAL HEALTH: ON AVERAGE, HOW MANY DAYS PER WEEK DO YOU ENGAGE IN MODERATE TO STRENUOUS EXERCISE (LIKE A BRISK WALK)?: 4 DAYS

## 2024-07-09 SDOH — HEALTH STABILITY: PHYSICAL HEALTH: ON AVERAGE, HOW MANY MINUTES DO YOU ENGAGE IN EXERCISE AT THIS LEVEL?: 30 MIN

## 2024-07-09 ASSESSMENT — SOCIAL DETERMINANTS OF HEALTH (SDOH): HOW OFTEN DO YOU GET TOGETHER WITH FRIENDS OR RELATIVES?: TWICE A WEEK

## 2024-07-09 ASSESSMENT — ASTHMA QUESTIONNAIRES
QUESTION_4 LAST FOUR WEEKS HOW OFTEN HAVE YOU USED YOUR RESCUE INHALER OR NEBULIZER MEDICATION (SUCH AS ALBUTEROL): NOT AT ALL
QUESTION_5 LAST FOUR WEEKS HOW WOULD YOU RATE YOUR ASTHMA CONTROL: COMPLETELY CONTROLLED
ACT_TOTALSCORE: 25
ACT_TOTALSCORE: 25
QUESTION_3 LAST FOUR WEEKS HOW OFTEN DID YOUR ASTHMA SYMPTOMS (WHEEZING, COUGHING, SHORTNESS OF BREATH, CHEST TIGHTNESS OR PAIN) WAKE YOU UP AT NIGHT OR EARLIER THAN USUAL IN THE MORNING: NOT AT ALL
QUESTION_1 LAST FOUR WEEKS HOW MUCH OF THE TIME DID YOUR ASTHMA KEEP YOU FROM GETTING AS MUCH DONE AT WORK, SCHOOL OR AT HOME: NONE OF THE TIME
QUESTION_2 LAST FOUR WEEKS HOW OFTEN HAVE YOU HAD SHORTNESS OF BREATH: NOT AT ALL

## 2024-07-09 ASSESSMENT — PATIENT HEALTH QUESTIONNAIRE - PHQ9
10. IF YOU CHECKED OFF ANY PROBLEMS, HOW DIFFICULT HAVE THESE PROBLEMS MADE IT FOR YOU TO DO YOUR WORK, TAKE CARE OF THINGS AT HOME, OR GET ALONG WITH OTHER PEOPLE: SOMEWHAT DIFFICULT
SUM OF ALL RESPONSES TO PHQ QUESTIONS 1-9: 7
SUM OF ALL RESPONSES TO PHQ QUESTIONS 1-9: 7

## 2024-07-10 ENCOUNTER — OFFICE VISIT (OUTPATIENT)
Dept: FAMILY MEDICINE | Facility: CLINIC | Age: 54
End: 2024-07-10
Payer: COMMERCIAL

## 2024-07-10 VITALS
HEIGHT: 66 IN | SYSTOLIC BLOOD PRESSURE: 112 MMHG | BODY MASS INDEX: 24.43 KG/M2 | OXYGEN SATURATION: 99 % | DIASTOLIC BLOOD PRESSURE: 80 MMHG | TEMPERATURE: 97.1 F | RESPIRATION RATE: 16 BRPM | WEIGHT: 152 LBS | HEART RATE: 74 BPM

## 2024-07-10 DIAGNOSIS — T50.905A DRUG-INDUCED WEIGHT LOSS: ICD-10-CM

## 2024-07-10 DIAGNOSIS — I10 BENIGN ESSENTIAL HYPERTENSION: ICD-10-CM

## 2024-07-10 DIAGNOSIS — Z00.00 ROUTINE GENERAL MEDICAL EXAMINATION AT A HEALTH CARE FACILITY: Primary | ICD-10-CM

## 2024-07-10 DIAGNOSIS — E78.5 HYPERLIPIDEMIA, UNSPECIFIED HYPERLIPIDEMIA TYPE: ICD-10-CM

## 2024-07-10 DIAGNOSIS — R76.8 ANA POSITIVE: ICD-10-CM

## 2024-07-10 DIAGNOSIS — Z12.11 SCREEN FOR COLON CANCER: ICD-10-CM

## 2024-07-10 DIAGNOSIS — R63.4 DRUG-INDUCED WEIGHT LOSS: ICD-10-CM

## 2024-07-10 LAB
ALBUMIN SERPL BCG-MCNC: 4.3 G/DL (ref 3.5–5.2)
ALP SERPL-CCNC: 56 U/L (ref 40–150)
ALT SERPL W P-5'-P-CCNC: 34 U/L (ref 0–50)
ANION GAP SERPL CALCULATED.3IONS-SCNC: 12 MMOL/L (ref 7–15)
AST SERPL W P-5'-P-CCNC: 28 U/L (ref 0–45)
BILIRUB SERPL-MCNC: 0.4 MG/DL
BUN SERPL-MCNC: 12.5 MG/DL (ref 6–20)
CALCIUM SERPL-MCNC: 9.6 MG/DL (ref 8.6–10)
CHLORIDE SERPL-SCNC: 106 MMOL/L (ref 98–107)
CHOLEST SERPL-MCNC: 201 MG/DL
CREAT SERPL-MCNC: 0.52 MG/DL (ref 0.51–0.95)
DEPRECATED HCO3 PLAS-SCNC: 24 MMOL/L (ref 22–29)
EGFRCR SERPLBLD CKD-EPI 2021: >90 ML/MIN/1.73M2
FASTING STATUS PATIENT QL REPORTED: YES
FASTING STATUS PATIENT QL REPORTED: YES
GLUCOSE SERPL-MCNC: 101 MG/DL (ref 70–99)
HBA1C MFR BLD: 5.5 % (ref 0–5.6)
HDLC SERPL-MCNC: 84 MG/DL
LDLC SERPL CALC-MCNC: 106 MG/DL
NONHDLC SERPL-MCNC: 117 MG/DL
POTASSIUM SERPL-SCNC: 3.8 MMOL/L (ref 3.4–5.3)
PROT SERPL-MCNC: 7.3 G/DL (ref 6.4–8.3)
SODIUM SERPL-SCNC: 142 MMOL/L (ref 135–145)
TRIGL SERPL-MCNC: 55 MG/DL
TSH SERPL DL<=0.005 MIU/L-ACNC: 2.33 UIU/ML (ref 0.3–4.2)
VIT D+METAB SERPL-MCNC: 44 NG/ML (ref 20–50)

## 2024-07-10 PROCEDURE — 80053 COMPREHEN METABOLIC PANEL: CPT | Performed by: FAMILY MEDICINE

## 2024-07-10 PROCEDURE — 84443 ASSAY THYROID STIM HORMONE: CPT | Performed by: FAMILY MEDICINE

## 2024-07-10 PROCEDURE — 36415 COLL VENOUS BLD VENIPUNCTURE: CPT | Performed by: FAMILY MEDICINE

## 2024-07-10 PROCEDURE — 99396 PREV VISIT EST AGE 40-64: CPT | Performed by: FAMILY MEDICINE

## 2024-07-10 PROCEDURE — 99214 OFFICE O/P EST MOD 30 MIN: CPT | Mod: 25 | Performed by: FAMILY MEDICINE

## 2024-07-10 PROCEDURE — 82306 VITAMIN D 25 HYDROXY: CPT | Performed by: FAMILY MEDICINE

## 2024-07-10 PROCEDURE — 83036 HEMOGLOBIN GLYCOSYLATED A1C: CPT | Performed by: FAMILY MEDICINE

## 2024-07-10 PROCEDURE — 80061 LIPID PANEL: CPT | Performed by: FAMILY MEDICINE

## 2024-07-10 RX ORDER — METOPROLOL SUCCINATE 25 MG/1
25 TABLET, EXTENDED RELEASE ORAL DAILY
Qty: 90 TABLET | Refills: 0 | Status: SHIPPED | OUTPATIENT
Start: 2024-07-10 | End: 2024-07-12

## 2024-07-10 NOTE — PROGRESS NOTES
Preventive Care Visit  [facility] WARREN Mejía MD, Family Medicine  Jul 10, 2024      Assessment & Plan     Routine general medical examination at a health care facility  - REVIEW OF HEALTH MAINTENANCE PROTOCOL ORDERS  - PRIMARY CARE FOLLOW-UP SCHEDULING; Future  - Lipid panel reflex to direct LDL Fasting; Future  - TSH with free T4 reflex; Future  - Vitamin D Deficiency; Future  - HEMOGLOBIN A1C; Future  - COMPREHENSIVE METABOLIC PANEL; Future  - Lipid panel reflex to direct LDL Fasting  - TSH with free T4 reflex  - Vitamin D Deficiency  - HEMOGLOBIN A1C  - COMPREHENSIVE METABOLIC PANEL    Screen for colon cancer  Patient is up-to-date with colonoscopy.  Will abstract this in her chart.    Hyperlipidemia, unspecified hyperlipidemia type  Recheck cholesterol today.  They are in a fairly good range but I think I would recommend continuing cholesterol medications for a while after she stops weight loss medications and then we can reassess    Benign essential hypertension  Refill metoprolol sent to the pharmacy  - metoprolol succinate ER (TOPROL XL) 25 MG 24 hr tablet; Take 1 tablet (25 mg) by mouth daily    ZAIDA positive  We spent significant amount of time discussing ZAIDA positivity.  She saw 1 rheumatologist but she does not feel as though she would like medication to treat.  I agree that no further diagnoses was found.  No treatment needed at this time if she is feeling well.  We can readdress if she begins to have joint pain or significant rashes    Drug-induced weight loss  Discussed how to wean the topiramate and phentermine.  She will let me know how things go.    Patient has been advised of split billing requirements and indicates understanding: Yes        Counseling  Appropriate preventive services were discussed with this patient, including applicable screening as appropriate for fall prevention, nutrition, physical activity, Tobacco-use cessation, weight loss and cognition.   Checklist reviewing preventive services available has been given to the patient.  Reviewed patient's diet, addressing concerns and/or questions.   The patient's PHQ-9 score is consistent with mild depression. She was provided with information regarding depression.           Zina Hassan is a 53 year old, presenting for the following:  Physical (Fasting for labs today- Catch up on medical concerns since last seen), Blood Pressure Check, and Anxiety        Via the Health Maintenance questionnaire, the patient has reported the following services have been completed -Colonscopy: [facility] 2023-12-01, this information has been sent to the abstraction team.  Health Care Directive  Patient does not have a Health Care Directive or Living Will: Discussed advance care planning with patient; however, patient declined at this time.    GERMAN Hassan is a pleasant 53-year-old female who presents to the clinic today for a physical.    Her  are living in [place] about 50% of the time and in [place] about 50% of the time.    She overall feels as though she is doing fairly well.  She has lost a significant amount of weight recently with topiramate and phentermine and feels as though she is made some good dietary modifications and is hopeful get off of these medications.    Otherwise, she has a history of hypertension/hyperlipidemia and would like to discuss getting off of medications for that as well.    She has been feeling somewhat anxious as she really was living in [place] much more in [place] but she feels as though she is able to advocate for herself bit more.              7/9/2024   General Health   How would you rate your overall physical health? (!) FAIR   Feel stress (tense, anxious, or unable to sleep) To some extent      (!) STRESS CONCERN      7/9/2024   Nutrition   Three or more servings of calcium each day? Yes   Diet: Carbohydrate counting   How many servings of fruit and vegetables per day? (!) 2-3    How many sweetened beverages each day? 0-1            7/9/2024   Exercise   Days per week of moderate/strenous exercise 4 days   Average minutes spent exercising at this level 30 min            7/9/2024   Social Factors   Frequency of gathering with friends or relatives Twice a week   Worry food won't last until get money to buy more No   Food not last or not have enough money for food? No   Do you have housing? (Housing is defined as stable permanent housing and does not include staying ouside in a car, in a tent, in an abandoned building, in an overnight shelter, or couch-surfing.) Yes   Are you worried about losing your housing? No   Lack of transportation? No   Unable to get utilities (heat,electricity)? No            7/9/2024   Fall Risk   Fallen 2 or more times in the past year? No   Trouble with walking or balance? Yes              7/9/2024   Dental   Dentist two times every year? Yes             Today's PHQ-9 Score:       7/9/2024    11:19 AM   PHQ-9 SCORE   PHQ-9 Total Score MyChart 7 (Mild depression)   PHQ-9 Total Score 7         7/9/2024   Substance Use   Alcohol more than 3/day or more than 7/wk No   Do you use any other substances recreationally? No        Social History     Tobacco Use    Smoking status: Former     Current packs/day: 1.00     Average packs/day: 1 pack/day for 20.0 years (20.0 ttl pk-yrs)     Types: Cigarettes    Smokeless tobacco: Never    Tobacco comments:     quit 2006   Substance Use Topics    Alcohol use: No           11/13/2023   LAST FHS-7 RESULTS   1st degree relative breast or ovarian cancer No   Any relative bilateral breast cancer No   Any male have breast cancer No   Any ONE woman have BOTH breast AND ovarian cancer No   Any woman with breast cancer before 50yrs No   2 or more relatives with breast AND/OR ovarian cancer No   2 or more relatives with breast AND/OR bowel cancer No                   7/9/2024   STI Screening   New sexual partner(s) since last STI/HIV test? No  "       History of abnormal Pap smear: Status post hysterectomy with removal of cervix and no history of CIN2 or greater or cervical cancer. Health Maintenance and Surgical History updated.       ASCVD Risk   The 10-year ASCVD risk score (Kenton OSBORN, et al., 2019) is: 1.1%    Values used to calculate the score:      Age: 53 years      Sex: Female      Is Non- : No      Diabetic: No      Tobacco smoker: No      Systolic Blood Pressure: 112 mmHg      Is BP treated: Yes      HDL Cholesterol: 84 mg/dL      Total Cholesterol: 201 mg/dL           Reviewed and updated as needed this visit by Provider                    Past Medical History:   Diagnosis Date    Anxiety disorder     Arthritis     Calculus of kidney     Calculus of kidney     Created by Conversion     Dysthymic disorder     Endometriosis     Created by Conversion  Replacement Utility updated for latest IMO load    Endometriosis, site unspecified     Fibromyalgia     GERD (gastroesophageal reflux disease)     HLD (hyperlipidemia)     Hypertension     Motion sickness     Myalgia and myositis, unspecified     Other chronic pain     Pneumonia     Spontaneous      Created by Conversion  Replacement Utility updated for latest IMO load    Uncomplicated asthma          Review of Systems  Constitutional, HEENT, cardiovascular, pulmonary, GI, , musculoskeletal, neuro, skin, endocrine and psych systems are negative, except as otherwise noted.     Objective    Exam  /80   Pulse 74   Temp 97.1  F (36.2  C) (Tympanic)   Resp 16   Ht 1.67 m (5' 5.75\")   Wt 68.9 kg (152 lb)   LMP 2009   SpO2 99%   BMI 24.72 kg/m     Estimated body mass index is 24.72 kg/m  as calculated from the following:    Height as of this encounter: 1.67 m (5' 5.75\").    Weight as of this encounter: 68.9 kg (152 lb).    Physical Exam    Physical Exam:  General Appearance: Alert, cooperative, no distress, appears stated age   Head: " Normocephalic, without obvious abnormality, atraumatic  Eyes: PERRL, conjunctiva/corneas clear, EOM's intact   Ears: Normal TM's and external ear canals, both ears  Nose:Nares normal, septum midline,mucosa normal, no drainage    Throat:Lips, mucosa, and tongue normal; teeth and gums normal  Neck: Supple, symmetrical, trachea midline, no adenopathy;  thyroid: not enlarged, symmetric, no tenderness/mass/nodules  Back: Symmetric, no curvature, ROM normal,  Lungs: Clear to auscultation bilaterally, respirations unlabored  Breasts: No breast masses, tenderness, asymmetry, or nipple discharge.  Heart: Regular rate and rhythm, S1 and S2 normal, no murmur, rub, or gallop  Abdomen: Soft, non-tender, bowel sounds active all four quadrants,  no masses, no organomegaly  Extremities: Extremities normal, atraumatic, no cyanosis or edema  Skin: Skin color, texture, turgor normal, no rashes or lesions  Lymph nodes: Cervical, supraclavicular, and axillary nodes normal and   Neurologic: Normal          Signed Electronically by: Lamar Mejía MD

## 2024-07-10 NOTE — PATIENT INSTRUCTIONS
Patient Education   Preventive Care Advice   This is general advice given by our system to help you stay healthy. However, your care team may have specific advice just for you. Please talk to your care team about your preventive care needs.  Nutrition  Eat 5 or more servings of fruits and vegetables each day.  Try wheat bread, brown rice and whole grain pasta (instead of white bread, rice, and pasta).  Get enough calcium and vitamin D. Check the label on foods and aim for 100% of the RDA (recommended daily allowance).  Lifestyle  Exercise at least 150 minutes each week  (30 minutes a day, 5 days a week).  Do muscle strengthening activities 2 days a week. These help control your weight and prevent disease.  No smoking.  Wear sunscreen to prevent skin cancer.  Have a dental exam and cleaning every 6 months.  Yearly exams  See your health care team every year to talk about:  Any changes in your health.  Any medicines your care team has prescribed.  Preventive care, family planning, and ways to prevent chronic diseases.  Shots (vaccines)   HPV shots (up to age 26), if you've never had them before.  Hepatitis B shots (up to age 59), if you've never had them before.  COVID-19 shot: Get this shot when it's due.  Flu shot: Get a flu shot every year.  Tetanus shot: Get a tetanus shot every 10 years.  Pneumococcal, hepatitis A, and RSV shots: Ask your care team if you need these based on your risk.  Shingles shot (for age 50 and up)  General health tests  Diabetes screening:  Starting at age 35, Get screened for diabetes at least every 3 years.  If you are younger than age 35, ask your care team if you should be screened for diabetes.  Cholesterol test: At age 39, start having a cholesterol test every 5 years, or more often if advised.  Bone density scan (DEXA): At age 50, ask your care team if you should have this scan for osteoporosis (brittle bones).  Hepatitis C: Get tested at least once in your life.  STIs (sexually  transmitted infections)  Before age 24: Ask your care team if you should be screened for STIs.  After age 24: Get screened for STIs if you're at risk. You are at risk for STIs (including HIV) if:  You are sexually active with more than one person.  You don't use condoms every time.  You or a partner was diagnosed with a sexually transmitted infection.  If you are at risk for HIV, ask about PrEP medicine to prevent HIV.  Get tested for HIV at least once in your life, whether you are at risk for HIV or not.  Cancer screening tests  Cervical cancer screening: If you have a cervix, begin getting regular cervical cancer screening tests starting at age 21.  Breast cancer scan (mammogram): If you've ever had breasts, begin having regular mammograms starting at age 40. This is a scan to check for breast cancer.  Colon cancer screening: It is important to start screening for colon cancer at age 45.  Have a colonoscopy test every 10 years (or more often if you're at risk) Or, ask your provider about stool tests like a FIT test every year or Cologuard test every 3 years.  To learn more about your testing options, visit:   .  For help making a decision, visit:   https://bit.ly/pb31452.  Prostate cancer screening test: If you have a prostate, ask your care team if a prostate cancer screening test (PSA) at age 55 is right for you.  Lung cancer screening: If you are a current or former smoker ages 50 to 80, ask your care team if ongoing lung cancer screenings are right for you.  For informational purposes only. Not to replace the advice of your health care provider. Copyright   2023 [facility]. All rights reserved. Clinically reviewed by the [facility] Transitions Program. [employer] 760876 - REV 01/24.  Preventing Falls: Care Instructions  Injuries and health problems such as trouble walking or poor eyesight can increase your risk of falling. So can some medicines. But there are things you can do to help  "prevent falls. You can exercise to get stronger. You can also arrange your home to make it safer.    Talk to your doctor about the medicines you take. Ask if any of them increase the risk of falls and whether they can be changed or stopped.   Try to exercise regularly. It can help improve your strength and balance. This can help lower your risk of falling.     Practice fall safety and prevention.    Wear low-heeled shoes that fit well and give your feet good support. Talk to your doctor if you have foot problems that make this hard.  Carry a cellphone or wear a medical alert device that you can use to call for help.  Use stepladders instead of chairs to reach high objects. Don't climb if you're at risk for falls. Ask for help, if needed.  Wear the correct eyeglasses, if you need them.    Make your home safer.    Remove rugs, cords, clutter, and furniture from walkways.  Keep your house well lit. Use night-lights in hallways and bathrooms.  Install and use sturdy handrails on stairways.  Wear nonskid footwear, even inside. Don't walk barefoot or in socks without shoes.    Be safe outside.    Use handrails, curb cuts, and ramps whenever possible.  Keep your hands free by using a shoulder bag or backpack.  Try to walk in well-lit areas. Watch out for uneven ground, changes in pavement, and debris.  Be careful in the winter. Walk on the grass or gravel when sidewalks are slippery. Use de-icer on steps and walkways. Add non-slip devices to shoes.    Put grab bars and nonskid mats in your shower or tub and near the toilet. Try to use a shower chair or bath bench when bathing.   Get into a tub or shower by putting in your weaker leg first. Get out with your strong side first. Have a phone or medical alert device in the bathroom with you.   Where can you learn more?  Go to https://www.[employer].net/patiented  Enter G117 in the search box to learn more about \"Preventing Falls: Care Instructions.\"  Current as of: July 17, " 2023               Content Version: 14.0    7560-6609 [employer].   Care instructions adapted under license by your healthcare professional. If you have questions about a medical condition or this instruction, always ask your healthcare professional. [employer] disclaims any warranty or liability for your use of this information.      Learning About Depression Screening  What is depression screening?  Depression screening is a way to see if you have depression symptoms. It may be done by a doctor or counselor. It's often part of a routine checkup. That's because your mental health is just as important as your physical health.  Depression is a mental health condition that affects how you feel, think, and act. You may:  Have less energy.  Lose interest in your daily activities.  Feel sad and grouchy for a long time.  Depression is very common. It affects people of all ages.  Many things can lead to depression. Some people become depressed after they have a stroke or find out they have a major illness like cancer or heart disease. The death of a loved one or a breakup may lead to depression. It can run in families. Most experts believe that a combination of inherited genes and stressful life events can cause it.  What happens during screening?  You may be asked to fill out a form about your depression symptoms. You and the doctor will discuss your answers. The doctor may ask you more questions to learn more about how you think, act, and feel.  What happens after screening?  If you have symptoms of depression, your doctor will talk to you about your options.  Doctors usually treat depression with medicines or counseling. Often, combining the two works best. Many people don't get help because they think that they'll get over the depression on their own. But people with depression may not get better unless they get treatment.  The cause of depression is not well understood. There may be many  "factors involved. But if you have depression, it's not your fault.  A serious symptom of depression is thinking about death or suicide. If you or someone you care about talks about this or about feeling hopeless, get help right away.  It's important to know that depression can be treated. Medicine, counseling, and self-care may help.  Where can you learn more?  Go to https://www.[employer].net/patiented  Enter T185 in the search box to learn more about \"Learning About Depression Screening.\"  Current as of: June 24, 2023               Content Version: 14.0    5138-6275 [employer].   Care instructions adapted under license by your healthcare professional. If you have questions about a medical condition or this instruction, always ask your healthcare professional. [employer] disclaims any warranty or liability for your use of this information.         "

## 2024-08-20 ASSESSMENT — ANXIETY QUESTIONNAIRES
7. FEELING AFRAID AS IF SOMETHING AWFUL MIGHT HAPPEN: MORE THAN HALF THE DAYS
GAD7 TOTAL SCORE: 18
5. BEING SO RESTLESS THAT IT IS HARD TO SIT STILL: MORE THAN HALF THE DAYS
3. WORRYING TOO MUCH ABOUT DIFFERENT THINGS: NEARLY EVERY DAY
4. TROUBLE RELAXING: NEARLY EVERY DAY
8. IF YOU CHECKED OFF ANY PROBLEMS, HOW DIFFICULT HAVE THESE MADE IT FOR YOU TO DO YOUR WORK, TAKE CARE OF THINGS AT HOME, OR GET ALONG WITH OTHER PEOPLE?: VERY DIFFICULT
6. BECOMING EASILY ANNOYED OR IRRITABLE: MORE THAN HALF THE DAYS
7. FEELING AFRAID AS IF SOMETHING AWFUL MIGHT HAPPEN: MORE THAN HALF THE DAYS
1. FEELING NERVOUS, ANXIOUS, OR ON EDGE: NEARLY EVERY DAY
GAD7 TOTAL SCORE: 18
IF YOU CHECKED OFF ANY PROBLEMS ON THIS QUESTIONNAIRE, HOW DIFFICULT HAVE THESE PROBLEMS MADE IT FOR YOU TO DO YOUR WORK, TAKE CARE OF THINGS AT HOME, OR GET ALONG WITH OTHER PEOPLE: VERY DIFFICULT
GAD7 TOTAL SCORE: 18
2. NOT BEING ABLE TO STOP OR CONTROL WORRYING: NEARLY EVERY DAY

## 2024-08-20 ASSESSMENT — PATIENT HEALTH QUESTIONNAIRE - PHQ9
SUM OF ALL RESPONSES TO PHQ QUESTIONS 1-9: 17
SUM OF ALL RESPONSES TO PHQ QUESTIONS 1-9: 17
10. IF YOU CHECKED OFF ANY PROBLEMS, HOW DIFFICULT HAVE THESE PROBLEMS MADE IT FOR YOU TO DO YOUR WORK, TAKE CARE OF THINGS AT HOME, OR GET ALONG WITH OTHER PEOPLE: SOMEWHAT DIFFICULT

## 2024-08-21 ENCOUNTER — VIRTUAL VISIT (OUTPATIENT)
Dept: FAMILY MEDICINE | Facility: CLINIC | Age: 54
End: 2024-08-21
Payer: COMMERCIAL

## 2024-08-21 DIAGNOSIS — F41.9 ANXIETY: Primary | ICD-10-CM

## 2024-08-21 PROCEDURE — 99214 OFFICE O/P EST MOD 30 MIN: CPT | Mod: 95 | Performed by: FAMILY MEDICINE

## 2024-08-21 PROCEDURE — G2211 COMPLEX E/M VISIT ADD ON: HCPCS | Mod: 95 | Performed by: FAMILY MEDICINE

## 2024-08-21 RX ORDER — ALPRAZOLAM 0.5 MG
0.5 TABLET ORAL
Qty: 10 TABLET | Refills: 0 | Status: SHIPPED | OUTPATIENT
Start: 2024-08-21 | End: 2024-09-23

## 2024-08-21 RX ORDER — FLUOXETINE 10 MG/1
CAPSULE ORAL
Qty: 50 CAPSULE | Refills: 0 | Status: SHIPPED | OUTPATIENT
Start: 2024-08-21 | End: 2024-09-20

## 2024-08-21 NOTE — PROGRESS NOTES
Sonya is a 53 year old who is being evaluated via a billable video visit.    How would you like to obtain your AVS? MyChart  If the video visit is dropped, the invitation should be resent by: Text to cell phone: [phone]  Will anyone else be joining your video visit? No      Assessment & Plan     Anxiety  Discussed risks and benefits of the medications.  We initially discussed bupropion however this is on her allergy list for possible seizure back in 2014.  I was able to review the emergency department note at that time and she states that she had an EEG which was normal.  It appears as though maybe she had more of a heatstroke type of situation but to be safe we will avoid bupropion for now.  This could be something we could consider in the future with close monitoring.    Prozac sent to the pharmacy to start with 10 mg daily for 10 days and then increase to 20 mg daily.  Xanax sent to the pharmacy to use as needed.  She uses this very sparingly.    She is meeting with a therapist today which I think will be helpful as well.  - FLUoxetine (PROZAC) 10 MG capsule; Take 1 capsule (10 mg) by mouth daily for 10 days, THEN 2 capsules (20 mg) daily for 20 days.  - ALPRAZolam (XANAX) 0.5 MG tablet; Take 1 tablet (0.5 mg) by mouth nightly as needed for anxiety.        The longitudinal plan of care for the diagnosis(es)/condition(s) as documented were addressed during this visit. Due to the added complexity in care, I will continue to support Sonya in the subsequent management and with ongoing continuity of care.        Zina Hassan is a 53 year old, presenting for the following health issues:  No chief complaint on file.    HPI     Depression and irritability: Patient has a past medical history significant for depression.  She was actually doing very well and weaned off of all of her medications about a year ago.  She notes that a few months ago she began to feel a bit depressed again and this has been worse in  the last 3 weeks.  She recently got another job and thought that this would help however unfortunately it has not helped and actually made her symptoms a bit worse.    She is meeting with a therapist today.  She has no suicidal or homicidal ideations.  She is able to discuss this with her  and friends.    The irritability is very bothersome for her.          Review of Systems  Constitutional, HEENT, cardiovascular, pulmonary, GI, , musculoskeletal, neuro, skin, endocrine and psych systems are negative, except as otherwise noted.      Objective           Vitals:  No vitals were obtained today due to virtual visit.    Physical Exam   GENERAL: alert and no distress  EYES: Eyes grossly normal to inspection.  No discharge or erythema, or obvious scleral/conjunctival abnormalities.  RESP: No audible wheeze, cough, or visible cyanosis.    SKIN: Visible skin clear. No significant rash, abnormal pigmentation or lesions.  NEURO: Cranial nerves grossly intact.  Mentation and speech appropriate for age.  PSYCH: Appropriate affect, tone, and pace of words          Video-Visit Details    Type of service:  Video Visit   Originating Location (pt. Location): Home    Distant Location (provider location):  On-site  Platform used for Video Visit: Benny  Signed Electronically by: Lamar Mejía MD

## 2024-08-30 RX ORDER — TOPIRAMATE 25 MG/1
25 TABLET, FILM COATED ORAL DAILY
OUTPATIENT
Start: 2024-08-30

## 2024-08-30 NOTE — TELEPHONE ENCOUNTER
Medication Question or Refill        What medication are you calling about (include dose and sig)?: Pending Prescriptions:                       Disp   Refills    topiramate (TOPAMAX) 25 MG tablet                             Sig: Take 1 tablet (25 mg) by mouth daily.        Preferred Pharmacy:       [id number] - [place], [place] - [address]  [address]  [place] [address]  Phone: [phone] Fax: [phone]      Controlled Substance Agreement on file:   CSA -- Patient Level:    CSA: None found at the patient level.       Who prescribed the medication?: Pt reports doctor in [place] prescribed but she is wanting Dr. Lamar Chen to refill     Do you need a refill? Yes    Patient offered an appointment? No    Do you have any questions or concerns?  Yes: Pt is concerned she is almost out of the medication and looking for refill asap      Could we send this information to you in [facility] or would you prefer to receive a phone call?:   Patient would prefer a phone call   Okay to leave a detailed message?: Yes at Home number on file [phone] (home)

## 2024-09-20 ENCOUNTER — MYC MEDICAL ADVICE (OUTPATIENT)
Dept: FAMILY MEDICINE | Facility: CLINIC | Age: 54
End: 2024-09-20
Payer: COMMERCIAL

## 2024-09-20 DIAGNOSIS — F41.9 ANXIETY: ICD-10-CM

## 2024-09-23 RX ORDER — ALPRAZOLAM 0.5 MG
0.5 TABLET ORAL
Qty: 30 TABLET | Refills: 0 | Status: SHIPPED | OUTPATIENT
Start: 2024-09-23

## 2024-09-23 RX ORDER — ALPRAZOLAM 0.5 MG
0.5 TABLET ORAL
Qty: 10 TABLET | Refills: 0 | Status: SHIPPED | OUTPATIENT
Start: 2024-09-23 | End: 2024-09-23

## 2024-10-14 DIAGNOSIS — K21.00 GASTROESOPHAGEAL REFLUX DISEASE WITH ESOPHAGITIS WITHOUT HEMORRHAGE: ICD-10-CM

## 2024-10-14 NOTE — TELEPHONE ENCOUNTER
Medication Question or Refill        What medication are you calling about (include dose and sig)?: omeprazole (PRILOSEC) 20 MG DR capsule     Preferred Pharmacy:       [id number] [place], [place] [address] [facility] AT [address]  [address] [address]  Phone: [phone] Fax: [phone]        Controlled Substance Agreement on file:   CSA -- Patient Level:    CSA: None found at the patient level.       Who prescribed the medication?: PCP    Do you need a refill? Yes    When did you use the medication last? ongoing    Patient offered an appointment? No    Do you have any questions or concerns?  Yes: Patient would like this sent to the [facility] in [place], not the mail order because she would like to be able to  right away.      Could we send this information to you in [facility] or would you prefer to receive a phone call?:   Patient would prefer a phone call   Okay to leave a detailed message?: Yes at Cell number on file:    Telephone Information:   Mobile [phone]

## 2025-01-29 ENCOUNTER — MYC REFILL (OUTPATIENT)
Dept: FAMILY MEDICINE | Facility: CLINIC | Age: 55
End: 2025-01-29
Payer: COMMERCIAL

## 2025-01-29 DIAGNOSIS — F41.9 ANXIETY: ICD-10-CM

## 2025-01-29 RX ORDER — ALPRAZOLAM 0.5 MG
0.5 TABLET ORAL
Qty: 30 TABLET | Refills: 0 | Status: SHIPPED | OUTPATIENT
Start: 2025-01-29

## 2025-03-05 ENCOUNTER — OFFICE VISIT (OUTPATIENT)
Dept: FAMILY MEDICINE | Facility: CLINIC | Age: 55
End: 2025-03-05
Payer: COMMERCIAL

## 2025-03-05 VITALS
HEART RATE: 78 BPM | OXYGEN SATURATION: 98 % | SYSTOLIC BLOOD PRESSURE: 120 MMHG | WEIGHT: 184 LBS | DIASTOLIC BLOOD PRESSURE: 78 MMHG | HEIGHT: 66 IN | TEMPERATURE: 99.2 F | RESPIRATION RATE: 16 BRPM | BODY MASS INDEX: 29.57 KG/M2

## 2025-03-05 DIAGNOSIS — E66.811 CLASS 1 OBESITY DUE TO EXCESS CALORIES WITHOUT SERIOUS COMORBIDITY WITH BODY MASS INDEX (BMI) OF 31.0 TO 31.9 IN ADULT: Primary | ICD-10-CM

## 2025-03-05 DIAGNOSIS — F41.9 ANXIETY: ICD-10-CM

## 2025-03-05 DIAGNOSIS — R41.840 INATTENTION: ICD-10-CM

## 2025-03-05 DIAGNOSIS — Z12.31 VISIT FOR SCREENING MAMMOGRAM: ICD-10-CM

## 2025-03-05 DIAGNOSIS — J30.2 SEASONAL ALLERGIC RHINITIS, UNSPECIFIED TRIGGER: ICD-10-CM

## 2025-03-05 DIAGNOSIS — E78.5 HYPERLIPIDEMIA LDL GOAL <130: ICD-10-CM

## 2025-03-05 DIAGNOSIS — J45.30 MILD PERSISTENT ASTHMA WITHOUT COMPLICATION: ICD-10-CM

## 2025-03-05 DIAGNOSIS — I10 BENIGN ESSENTIAL HYPERTENSION: ICD-10-CM

## 2025-03-05 DIAGNOSIS — E66.09 CLASS 1 OBESITY DUE TO EXCESS CALORIES WITHOUT SERIOUS COMORBIDITY WITH BODY MASS INDEX (BMI) OF 31.0 TO 31.9 IN ADULT: Primary | ICD-10-CM

## 2025-03-05 PROCEDURE — 3078F DIAST BP <80 MM HG: CPT

## 2025-03-05 PROCEDURE — 1126F AMNT PAIN NOTED NONE PRSNT: CPT

## 2025-03-05 PROCEDURE — 99214 OFFICE O/P EST MOD 30 MIN: CPT

## 2025-03-05 PROCEDURE — 3074F SYST BP LT 130 MM HG: CPT

## 2025-03-05 RX ORDER — METOPROLOL SUCCINATE 50 MG/1
50 TABLET, EXTENDED RELEASE ORAL DAILY
Qty: 90 TABLET | Refills: 3 | Status: SHIPPED | OUTPATIENT
Start: 2025-03-05

## 2025-03-05 RX ORDER — FLUTICASONE PROPIONATE 50 MCG
1 SPRAY, SUSPENSION (ML) NASAL DAILY
COMMUNITY

## 2025-03-05 RX ORDER — PHENTERMINE HYDROCHLORIDE 15 MG/1
CAPSULE ORAL
Qty: 90 CAPSULE | Refills: 0 | Status: SHIPPED | OUTPATIENT
Start: 2025-03-05 | End: 2025-03-06

## 2025-03-05 RX ORDER — FLUTICASONE FUROATE 100 UG/1
1 POWDER RESPIRATORY (INHALATION) DAILY
Qty: 30 EACH | Refills: 1 | Status: SHIPPED | OUTPATIENT
Start: 2025-03-05

## 2025-03-05 RX ORDER — PRAVASTATIN SODIUM 20 MG
20 TABLET ORAL DAILY
Qty: 90 TABLET | Refills: 1 | Status: SHIPPED | OUTPATIENT
Start: 2025-03-05

## 2025-03-05 RX ORDER — MONTELUKAST SODIUM 10 MG/1
1 TABLET ORAL DAILY
Qty: 90 TABLET | Refills: 2 | Status: SHIPPED | OUTPATIENT
Start: 2025-03-05

## 2025-03-05 RX ORDER — TOPIRAMATE 25 MG/1
25 TABLET, FILM COATED ORAL DAILY
Qty: 30 TABLET | Refills: 2 | Status: SHIPPED | OUTPATIENT
Start: 2025-03-05 | End: 2025-03-06

## 2025-03-05 ASSESSMENT — ANXIETY QUESTIONNAIRES
3. WORRYING TOO MUCH ABOUT DIFFERENT THINGS: NOT AT ALL
GAD7 TOTAL SCORE: 6
6. BECOMING EASILY ANNOYED OR IRRITABLE: SEVERAL DAYS
1. FEELING NERVOUS, ANXIOUS, OR ON EDGE: SEVERAL DAYS
5. BEING SO RESTLESS THAT IT IS HARD TO SIT STILL: SEVERAL DAYS
IF YOU CHECKED OFF ANY PROBLEMS ON THIS QUESTIONNAIRE, HOW DIFFICULT HAVE THESE PROBLEMS MADE IT FOR YOU TO DO YOUR WORK, TAKE CARE OF THINGS AT HOME, OR GET ALONG WITH OTHER PEOPLE: NOT DIFFICULT AT ALL
GAD7 TOTAL SCORE: 6
2. NOT BEING ABLE TO STOP OR CONTROL WORRYING: SEVERAL DAYS
7. FEELING AFRAID AS IF SOMETHING AWFUL MIGHT HAPPEN: SEVERAL DAYS

## 2025-03-05 ASSESSMENT — ASTHMA QUESTIONNAIRES
QUESTION_1 LAST FOUR WEEKS HOW MUCH OF THE TIME DID YOUR ASTHMA KEEP YOU FROM GETTING AS MUCH DONE AT WORK, SCHOOL OR AT HOME: NONE OF THE TIME
QUESTION_4 LAST FOUR WEEKS HOW OFTEN HAVE YOU USED YOUR RESCUE INHALER OR NEBULIZER MEDICATION (SUCH AS ALBUTEROL): NOT AT ALL
QUESTION_5 LAST FOUR WEEKS HOW WOULD YOU RATE YOUR ASTHMA CONTROL: COMPLETELY CONTROLLED
ACT_TOTALSCORE: 25
ACT_TOTALSCORE: 25
QUESTION_3 LAST FOUR WEEKS HOW OFTEN DID YOUR ASTHMA SYMPTOMS (WHEEZING, COUGHING, SHORTNESS OF BREATH, CHEST TIGHTNESS OR PAIN) WAKE YOU UP AT NIGHT OR EARLIER THAN USUAL IN THE MORNING: NOT AT ALL
QUESTION_2 LAST FOUR WEEKS HOW OFTEN HAVE YOU HAD SHORTNESS OF BREATH: NOT AT ALL

## 2025-03-05 ASSESSMENT — PATIENT HEALTH QUESTIONNAIRE - PHQ9
SUM OF ALL RESPONSES TO PHQ QUESTIONS 1-9: 5
10. IF YOU CHECKED OFF ANY PROBLEMS, HOW DIFFICULT HAVE THESE PROBLEMS MADE IT FOR YOU TO DO YOUR WORK, TAKE CARE OF THINGS AT HOME, OR GET ALONG WITH OTHER PEOPLE: NOT DIFFICULT AT ALL
5. POOR APPETITE OR OVEREATING: SEVERAL DAYS
SUM OF ALL RESPONSES TO PHQ QUESTIONS 1-9: 5

## 2025-03-05 ASSESSMENT — ENCOUNTER SYMPTOMS: NERVOUS/ANXIOUS: 1

## 2025-03-05 ASSESSMENT — PAIN SCALES - GENERAL: PAINLEVEL_OUTOF10: NO PAIN (0)

## 2025-03-05 NOTE — PROGRESS NOTES
Assessment & Plan     Class 1 obesity due to excess calories without serious comorbidity with body mass index (BMI) of 31.0 to 31.9 in adult  Discussed risks/benefits of medication. Pt had good success with topiramate and phentermine in the past, but has gained weight back. Plan to start phentermine at 15 mg for 1 month, then increase to 30 mg if no results. Pt to followup in 4-6 weeks either virually or in person. Pt was doing well with 25 mg topiramate, so this was prescribed. Has appointment scheduled for July with Dr. Overton.  - topiramate (TOPAMAX) 25 MG tablet  Dispense: 30 tablet; Refill: 2  - phentermine 15 MG capsule  Dispense: 90 capsule; Refill: 0    Inattention  Pt reports lifelong struggle with attention, impulsivity and performance in school. Thinks she may have ADHD. Phenteramine helped her with these symptoms in the past. We are restarting this for weight management, and referral placed for adhd evaluation as phentermine is not the recommended stimulant to treat adhd.   - Adult Mental Health  Referral    Hyperlipidemia LDL goal <130  Refills provided  - pravastatin (PRAVACHOL) 20 MG tablet  Dispense: 90 tablet; Refill: 1    Anxiety  Pt was started on prozac for anxiety last August. Had been on before and tolerated well. This time is made her very nauseous and she could not tolerate the side effects after a few weeks. Discussed alternative medications at length, pt declines SSRIs or buspar. Feels like anxiety has been worsening and topiramate was helpful. As we are prescribing topiramate for weight loss, we can see if this improves anxiety. Followup in 1 month    Seasonal allergic rhinitis, unspecified trigger  Refills provided  - montelukast (SINGULAIR) 10 MG tablet  Dispense: 90 tablet; Refill: 2    Mild persistent asthma without complication  Well controlled, refills provided.   - fluticasone (ARNUITY ELLIPTA) 100 MCG/ACT inhaler  Dispense: 30 each; Refill: 1    Visit for screening  "mammogram  - MA Screen Bilateral w/Lm    Benign essential hypertension  Refills provided  - metoprolol succinate ER (TOPROL XL) 50 MG 24 hr tablet  Dispense: 90 tablet; Refill: 3      BMI  Estimated body mass index is 29.92 kg/m  as calculated from the following:    Height as of this encounter: 1.67 m (5' 5.75\").    Weight as of this encounter: 83.5 kg (184 lb).   Weight management plan: Specific weight management program called   discussed          Subjective   Sonya is a 54 year old, presenting for the following health issues:  Recheck Medication, Anxiety, Depression, and Weight Problem    History of Present Illness       Reason for visit:  Weight and intermitent anxiety    She eats 2-3 servings of fruits and vegetables daily.She consumes 0 sweetened beverage(s) daily.She exercises with enough effort to increase her heart rate 10 to 19 minutes per day.  She exercises with enough effort to increase her heart rate 4 days per week.   She is taking medications regularly.            10/28/2019     5:00 PM 7/26/2021     4:51 PM 8/20/2024     9:01 AM   STEVE-7 SCORE   Total Score   18 (severe anxiety)   Total Score 0 20 18         7/9/2024    11:19 AM 8/20/2024     8:58 AM 3/5/2025    10:06 AM   PHQ   PHQ-9 Total Score 7 17 5    Q9: Thoughts of better off dead/self-harm past 2 weeks Not at all Not at all Not at all       Patient-reported     Anxiety has been increasing. Was prescribed fluoxetine, stopped taking it due to headaches/side effects.     Uses breathing techniques which help. Pt having panic attacks. Comes out of the blue, feels impending doom, feels like she is having a heart attack. Can calm herself down, but his is happening more frequently.     Mother in law had 2 massive strokes recently, has had a big work contract fall through. Xanax helps when it gets really bad but 0.5 mg knocks her out so she often takes about 0.25 mg. taking    Has stopped    Has had some weight gain, was taking topiramate and " "phentermine. Did not have anxiety while taking these.     Feels like she has always had adhd symptoms, school was very difficult. Has had issues with impulsivity her whole life.     Wt Readings from Last 4 Encounters:   03/05/25 83.5 kg (184 lb)   07/10/24 68.9 kg (152 lb)   06/30/22 102.1 kg (225 lb)   04/22/22 102.7 kg (226 lb 5 oz)         Takes 1 mg estradiol.               Review of Systems  Constitutional, HEENT, cardiovascular, pulmonary, gi and gu systems are negative, except as otherwise noted.      Objective    /78 (BP Location: Right arm, Patient Position: Sitting, Cuff Size: Adult Regular)   Pulse 78   Temp 99.2  F (37.3  C) (Tympanic)   Resp 16   Ht 1.67 m (5' 5.75\")   Wt 83.5 kg (184 lb)   LMP 06/20/2009   SpO2 98%   BMI 29.92 kg/m    Body mass index is 29.92 kg/m .  Physical Exam   GENERAL: alert and no distress  RESP: lungs clear to auscultation - no rales, rhonchi or wheezes  CV: regular rate and rhythm, normal S1 S2, no S3 or S4, no murmur, click or rub, no peripheral edema  MS: no gross musculoskeletal defects noted, no edema  PSYCH: mentation appears normal, concentration poor, affect normal/bright, and speech pressured            Signed Electronically by: UMBERTO Mojica CNP    "

## 2025-03-05 NOTE — PATIENT INSTRUCTIONS
Followup between 1-2 months to check on weight loss and anxiety. This can be virtual.     Followup with Dr. Overton when it is time to transition off of phenetermine in 3-6 months to see about ADHD medications.     Consider doing a neuropsych eval

## 2025-03-06 ENCOUNTER — PATIENT OUTREACH (OUTPATIENT)
Dept: CARE COORDINATION | Facility: CLINIC | Age: 55
End: 2025-03-06
Payer: COMMERCIAL

## 2025-03-06 DIAGNOSIS — E66.09 CLASS 1 OBESITY DUE TO EXCESS CALORIES WITHOUT SERIOUS COMORBIDITY WITH BODY MASS INDEX (BMI) OF 31.0 TO 31.9 IN ADULT: ICD-10-CM

## 2025-03-06 DIAGNOSIS — E66.811 CLASS 1 OBESITY DUE TO EXCESS CALORIES WITHOUT SERIOUS COMORBIDITY WITH BODY MASS INDEX (BMI) OF 31.0 TO 31.9 IN ADULT: ICD-10-CM

## 2025-03-06 RX ORDER — TOPIRAMATE 25 MG/1
25 TABLET, FILM COATED ORAL DAILY
Qty: 90 TABLET | Refills: 1 | Status: SHIPPED | OUTPATIENT
Start: 2025-03-06

## 2025-03-06 RX ORDER — PHENTERMINE HYDROCHLORIDE 15 MG/1
CAPSULE ORAL
Qty: 90 CAPSULE | Refills: 0 | Status: SHIPPED | OUTPATIENT
Start: 2025-03-06 | End: 2025-05-05

## 2025-03-17 ENCOUNTER — TELEPHONE (OUTPATIENT)
Dept: FAMILY MEDICINE | Facility: CLINIC | Age: 55
End: 2025-03-17
Payer: COMMERCIAL

## 2025-03-17 DIAGNOSIS — F41.9 ANXIETY: ICD-10-CM

## 2025-03-17 RX ORDER — ALPRAZOLAM 0.5 MG
0.5 TABLET ORAL
Qty: 30 TABLET | Refills: 0 | Status: SHIPPED | OUTPATIENT
Start: 2025-03-17

## 2025-03-17 NOTE — TELEPHONE ENCOUNTER
Medication Question or Refill    Contacts       Contact Date/Time Type Contact Phone/Fax    03/17/2025 09:20 AM CDT Phone (Incoming) Sonya Mosqueda (Self) [phone] (M)            What medication are you calling about (include dose and sig)?: alprazolam    Preferred Pharmacy:    [id number] - [place], [place] - [address]  [address] [address]  Phone: [phone] Fax: [phone]      Controlled Substance Agreement on file:   CSA -- Patient Level:    CSA: None found at the patient level.       Who prescribed the medication?: Dr. Mejía      Do you need a refill? Yes    When did you use the medication last?     Patient offered an appointment? No    Do you have any questions or concerns?  No      Could we send this information to you in [employer] or would you prefer to receive a phone call?:   Patient would like to be contacted via [employer]

## 2025-03-27 DIAGNOSIS — J45.30 MILD PERSISTENT ASTHMA WITHOUT COMPLICATION: ICD-10-CM

## 2025-04-14 DIAGNOSIS — J45.30 MILD PERSISTENT ASTHMA WITHOUT COMPLICATION: ICD-10-CM

## 2025-04-14 RX ORDER — FLUTICASONE FUROATE 100 UG/1
1 POWDER RESPIRATORY (INHALATION) DAILY
Qty: 30 EACH | Refills: 5 | Status: SHIPPED | OUTPATIENT
Start: 2025-04-14

## 2025-04-16 ENCOUNTER — VIRTUAL VISIT (OUTPATIENT)
Dept: FAMILY MEDICINE | Facility: CLINIC | Age: 55
End: 2025-04-16
Payer: COMMERCIAL

## 2025-04-16 DIAGNOSIS — Z53.9 NO SHOW: Primary | ICD-10-CM

## 2025-04-16 PROCEDURE — 99207 PR NO CHARGE LOS: CPT | Mod: 93

## 2025-06-13 ENCOUNTER — OFFICE VISIT (OUTPATIENT)
Dept: FAMILY MEDICINE | Facility: CLINIC | Age: 55
End: 2025-06-13
Payer: COMMERCIAL

## 2025-06-13 VITALS
RESPIRATION RATE: 16 BRPM | WEIGHT: 193.6 LBS | BODY MASS INDEX: 31.12 KG/M2 | HEART RATE: 73 BPM | DIASTOLIC BLOOD PRESSURE: 74 MMHG | HEIGHT: 66 IN | SYSTOLIC BLOOD PRESSURE: 114 MMHG | OXYGEN SATURATION: 98 % | TEMPERATURE: 98.6 F

## 2025-06-13 DIAGNOSIS — F41.9 ANXIETY: ICD-10-CM

## 2025-06-13 DIAGNOSIS — R41.840 INATTENTION: ICD-10-CM

## 2025-06-13 DIAGNOSIS — Z13.6 SCREENING FOR CARDIOVASCULAR CONDITION: ICD-10-CM

## 2025-06-13 DIAGNOSIS — B00.9 HSV (HERPES SIMPLEX VIRUS) INFECTION: ICD-10-CM

## 2025-06-13 DIAGNOSIS — N89.8 VAGINAL ODOR: ICD-10-CM

## 2025-06-13 DIAGNOSIS — C51.9 VULVAR CANCER (H): ICD-10-CM

## 2025-06-13 DIAGNOSIS — R10.2 VULVAR PAIN: ICD-10-CM

## 2025-06-13 DIAGNOSIS — R63.5 WEIGHT GAIN: Primary | ICD-10-CM

## 2025-06-13 DIAGNOSIS — I10 BENIGN ESSENTIAL HYPERTENSION: ICD-10-CM

## 2025-06-13 LAB
CLUE CELLS: ABNORMAL
TRICHOMONAS, WET PREP: ABNORMAL
TSH SERPL DL<=0.005 MIU/L-ACNC: 3.27 UIU/ML (ref 0.3–4.2)
WBC'S/HIGH POWER FIELD, WET PREP: ABNORMAL
YEAST, WET PREP: ABNORMAL

## 2025-06-13 PROCEDURE — 36415 COLL VENOUS BLD VENIPUNCTURE: CPT

## 2025-06-13 PROCEDURE — 87210 SMEAR WET MOUNT SALINE/INK: CPT

## 2025-06-13 PROCEDURE — 3074F SYST BP LT 130 MM HG: CPT

## 2025-06-13 PROCEDURE — 84443 ASSAY THYROID STIM HORMONE: CPT

## 2025-06-13 PROCEDURE — G2211 COMPLEX E/M VISIT ADD ON: HCPCS

## 2025-06-13 PROCEDURE — 93000 ELECTROCARDIOGRAM COMPLETE: CPT

## 2025-06-13 PROCEDURE — 3078F DIAST BP <80 MM HG: CPT

## 2025-06-13 PROCEDURE — 99214 OFFICE O/P EST MOD 30 MIN: CPT

## 2025-06-13 RX ORDER — MULTIVITAMIN
TABLET ORAL DAILY
COMMUNITY
Start: 2024-10-21

## 2025-06-13 RX ORDER — CHLORHEXIDINE GLUCONATE 4 %
LIQUID (ML) TOPICAL DAILY
COMMUNITY
Start: 2024-10-21

## 2025-06-13 RX ORDER — VALACYCLOVIR HYDROCHLORIDE 1 G/1
1000 TABLET, FILM COATED ORAL EVERY 12 HOURS
COMMUNITY
End: 2025-06-13

## 2025-06-13 RX ORDER — ALPRAZOLAM 0.5 MG
0.5 TABLET ORAL
Qty: 30 TABLET | Refills: 0 | Status: SHIPPED | OUTPATIENT
Start: 2025-06-13

## 2025-06-13 RX ORDER — TOPIRAMATE 25 MG/1
TABLET, FILM COATED ORAL
Qty: 148 TABLET | Refills: 0 | Status: SHIPPED | OUTPATIENT
Start: 2025-06-13 | End: 2025-07-27

## 2025-06-13 RX ORDER — VALACYCLOVIR HYDROCHLORIDE 1 G/1
1000 TABLET, FILM COATED ORAL DAILY
Qty: 5 TABLET | Refills: 3 | Status: SHIPPED | OUTPATIENT
Start: 2025-06-13

## 2025-06-13 RX ORDER — ESTRADIOL 1 MG/1
1 TABLET ORAL EVERY MORNING
COMMUNITY
Start: 2024-10-21

## 2025-06-13 NOTE — PROGRESS NOTES
Assessment & Plan     Weight gain  EKG NSR. Will check labs today, plan to increase topamax to 50 mg daily for 2 weeks, then to 100 mg daily. Risks/benefits discussed. Has not had success with phentermine and topiratmate yet, though she had significant stressors including a sick mother and new cancer diagnosis.  Followup in 1-2 months.   - TSH with free T4 reflex  - topiramate (TOPAMAX) 25 MG tablet  Dispense: 148 tablet; Refill: 0  - EKG 12-lead complete w/read - Clinics  - TSH with free T4 reflex    Inattention  Pt reports she focuses much better taking phentermine. Referral placed for adhd eval  - Adult Mental Health  Referral    HSV (herpes simplex virus) infection  Pt had herpes outbreak previously, having pain on R labia that feels similar. No blisters visible, could be a prodrome. Refills provided.   - valACYclovir (VALTREX) 1000 mg tablet  Dispense: 5 tablet; Refill: 3    Vulvar pain  Wet prep negative.   - Wet prep - lab collect  - Wet prep - lab collect    Vaginal odor  Wet prep negative  - Wet prep - lab collect  - Wet prep - lab collect    Vulvar cancer (H)  Pt had vulvar cancer removed from L labia. Incision site well healed, no signs of infection. Pt stats she does not need to do any chemo or radiation, just follows up in 6 months.     Anxiety  One time refill provided- pt to discuss with Dr. Mejía if needed for longer term use.   - ALPRAZolam (XANAX) 0.5 MG tablet  Dispense: 30 tablet; Refill: 0    Benign essential hypertension  Well controlled           The longitudinal plan of care for the diagnosis(es)/condition(s) as documented were addressed during this visit. Due to the added complexity in care, I will continue to support Sonya in the subsequent management and with ongoing continuity of care.      Subjective   Sonya is a 54 year old, presenting for the following health issues:  RECHECK      6/13/2025     1:15 PM   Additional Questions   Roomed by Alexandra CENTENO   Accompanied by self  "    History of Present Illness       Reason for visit:  Pelvic exam    She eats 2-3 servings of fruits and vegetables daily.She consumes 0 sweetened beverage(s) daily.She exercises with enough effort to increase her heart rate 30 to 60 minutes per day.  She exercises with enough effort to increase her heart rate 4 days per week.   She is taking medications regularly.      Patient noticed oder and was told to come in and be checked out.        Had partial vulvectomy for vulvar cancer while in [place]. About 10 days ago, pt noticed odor.   Surgery was in April. Odor is almost like vinegar.       Pt has had genital herpes in January    No discharge or itching. Feels like she has some vaginal pain on opposite side of surgery. Feels very similar to pain from previous herpes outbreak.     Pt has been taking topiramate and phentermine. Intermittent fasting for 14-16 hours a day. Has not had any side effects. Has gained weight- stress ate a lot after the cancer diagnosis.       Wt Readings from Last 4 Encounters:   06/13/25 87.8 kg (193 lb 9.6 oz)   03/05/25 83.5 kg (184 lb)   07/10/24 68.9 kg (152 lb)   06/30/22 102.1 kg (225 lb)               Objective    /74   Pulse 73   Temp 98.6  F (37  C) (Tympanic)   Resp 16   Ht 1.67 m (5' 5.75\")   Wt 87.8 kg (193 lb 9.6 oz)   LMP 06/20/2009   SpO2 98%   BMI 31.49 kg/m    Body mass index is 31.49 kg/m .  Physical Exam   GENERAL: alert and no distress  RESP: lungs clear to auscultation - no rales, rhonchi or wheezes  CV: regular rate and rhythm, normal S1 S2, no S3 or S4, no murmur, click or rub, no peripheral edema   (female): +well healed surgical incision L labia minora, no blisters or irritation on r. normal female external genitalia, normal urethral meatus, normal vaginal mucosa          Signed Electronically by: UMBERTO Mojica CNP    "

## 2025-06-16 ENCOUNTER — PATIENT OUTREACH (OUTPATIENT)
Dept: CARE COORDINATION | Facility: CLINIC | Age: 55
End: 2025-06-16
Payer: COMMERCIAL

## 2025-06-18 ENCOUNTER — PATIENT OUTREACH (OUTPATIENT)
Dept: CARE COORDINATION | Facility: CLINIC | Age: 55
End: 2025-06-18
Payer: COMMERCIAL

## 2025-06-25 RX ORDER — FLUTICASONE FUROATE 100 UG/1
POWDER RESPIRATORY (INHALATION)
Refills: 5 | OUTPATIENT
Start: 2025-06-25

## 2025-07-07 PROBLEM — F90.0 ATTENTION DEFICIT HYPERACTIVITY DISORDER, PREDOMINANTLY INATTENTIVE TYPE: Status: ACTIVE | Noted: 2025-07-07

## 2025-07-07 PROBLEM — D28.0 BENIGN NEOPLASM OF VULVA: Status: ACTIVE | Noted: 2025-07-07

## 2025-07-07 PROBLEM — I10 ESSENTIAL HYPERTENSION: Status: ACTIVE | Noted: 2025-07-07

## 2025-07-07 PROBLEM — R91.1 NODULE OF LEFT LUNG: Status: ACTIVE | Noted: 2024-06-15

## 2025-07-07 PROBLEM — B00.9 HERPESVIRUS INFECTION: Status: ACTIVE | Noted: 2025-07-07

## 2025-07-14 ENCOUNTER — PATIENT OUTREACH (OUTPATIENT)
Dept: CARE COORDINATION | Facility: CLINIC | Age: 55
End: 2025-07-14
Payer: COMMERCIAL

## 2025-07-15 ENCOUNTER — TELEPHONE (OUTPATIENT)
Dept: FAMILY MEDICINE | Facility: CLINIC | Age: 55
End: 2025-07-15
Payer: COMMERCIAL

## 2025-07-15 NOTE — TELEPHONE ENCOUNTER
S-(situation): Sonya is calling with questions about a potential vaccine reaction.     B-(background): Received Pneumococcal and Zoster vaccines on 07/11/25.   Had severe reaction to the small pox vaccine as a child.     A-(assessment): Received both vaccines in her right arm. Achy over the weekend. Sunday thought she had a mosquito bite, developed a couple more spots on Sunday. Woke up with blisters only on her right side. They are very very itchy.   No breathing issues or facial swelling.The blisters are fluid filled and intact, no crusting. No known fever No red streaking, surrounding skin is not hot to touch. She has one blister under her breast, a couple in her groin, under her arm, at her waist and 1 on the back of her leg, all on the right side.   She already takes zyrtec, Singulair and Flonase everyday.    R-(recommendations): Could try oatmeal or Aveeno bath, calamine lotion. Benadryl if needed for itching. Er if issues breathing, wheezing, develops facial swelling, severe headache or stiff neck.   Sonya Molina is wondering is she should get the second shingles vaccine. Does she just let this takes it's course or should she do something different?   Please advise, Chela RESTREPO RN

## 2025-07-15 NOTE — TELEPHONE ENCOUNTER
Call placed to Patient.  Relayed Dr Mejía message to Patient.  Patient will send some pictures through [employer].  Davin ADAN, Clinic RN   [facility]

## 2025-07-15 NOTE — TELEPHONE ENCOUNTER
Hi - can you have her send me a [employer] message with a picture of the blisters?    Thanks    EB

## 2025-08-27 ENCOUNTER — MYC REFILL (OUTPATIENT)
Dept: FAMILY MEDICINE | Facility: CLINIC | Age: 55
End: 2025-08-27
Payer: COMMERCIAL

## 2025-08-27 DIAGNOSIS — F41.9 ANXIETY: ICD-10-CM

## 2025-08-27 DIAGNOSIS — Z78.0 MENOPAUSE: Primary | ICD-10-CM

## 2025-08-27 DIAGNOSIS — R63.5 WEIGHT GAIN: ICD-10-CM

## 2025-08-28 RX ORDER — ESTRADIOL 1 MG/1
1 TABLET ORAL EVERY MORNING
Qty: 90 TABLET | Refills: 2 | Status: SHIPPED | OUTPATIENT
Start: 2025-08-28

## 2025-08-28 RX ORDER — ALPRAZOLAM 0.5 MG
0.5 TABLET ORAL
Qty: 30 TABLET | Refills: 0 | Status: SHIPPED | OUTPATIENT
Start: 2025-08-28

## 2025-08-28 RX ORDER — TOPIRAMATE 50 MG/1
50 TABLET, FILM COATED ORAL 2 TIMES DAILY
Qty: 180 TABLET | Refills: 0 | Status: SHIPPED | OUTPATIENT
Start: 2025-08-28